# Patient Record
Sex: FEMALE | Race: WHITE | NOT HISPANIC OR LATINO
[De-identification: names, ages, dates, MRNs, and addresses within clinical notes are randomized per-mention and may not be internally consistent; named-entity substitution may affect disease eponyms.]

---

## 2017-02-16 ENCOUNTER — APPOINTMENT (OUTPATIENT)
Dept: FAMILY MEDICINE | Facility: CLINIC | Age: 62
End: 2017-02-16

## 2017-02-16 ENCOUNTER — NON-APPOINTMENT (OUTPATIENT)
Age: 62
End: 2017-02-16

## 2017-02-16 VITALS
WEIGHT: 228.38 LBS | SYSTOLIC BLOOD PRESSURE: 124 MMHG | DIASTOLIC BLOOD PRESSURE: 84 MMHG | BODY MASS INDEX: 39.47 KG/M2 | HEIGHT: 63.75 IN

## 2017-02-21 LAB
HBA1C MFR BLD HPLC: 6.4 %
T4 FREE SERPL-MCNC: 1.3 NG/DL
TSH SERPL-ACNC: 7.25 UIU/ML

## 2017-03-16 ENCOUNTER — APPOINTMENT (OUTPATIENT)
Dept: FAMILY MEDICINE | Facility: CLINIC | Age: 62
End: 2017-03-16

## 2017-08-15 ENCOUNTER — APPOINTMENT (OUTPATIENT)
Dept: FAMILY MEDICINE | Facility: CLINIC | Age: 62
End: 2017-08-15
Payer: MEDICARE

## 2017-08-15 ENCOUNTER — LABORATORY RESULT (OUTPATIENT)
Age: 62
End: 2017-08-15

## 2017-08-15 VITALS
BODY MASS INDEX: 39.75 KG/M2 | RESPIRATION RATE: 16 BRPM | SYSTOLIC BLOOD PRESSURE: 132 MMHG | DIASTOLIC BLOOD PRESSURE: 80 MMHG | OXYGEN SATURATION: 97 % | WEIGHT: 230 LBS | HEART RATE: 68 BPM | HEIGHT: 63.75 IN

## 2017-08-15 DIAGNOSIS — M62.830 MUSCLE SPASM OF BACK: ICD-10-CM

## 2017-08-15 DIAGNOSIS — M62.838 OTHER MUSCLE SPASM: ICD-10-CM

## 2017-08-15 PROCEDURE — 36415 COLL VENOUS BLD VENIPUNCTURE: CPT

## 2017-08-15 PROCEDURE — 99214 OFFICE O/P EST MOD 30 MIN: CPT | Mod: 25

## 2017-08-15 RX ORDER — AMOXICILLIN 500 MG/1
500 CAPSULE ORAL
Qty: 21 | Refills: 0 | Status: COMPLETED | COMMUNITY
Start: 2017-02-20

## 2017-08-15 RX ORDER — IBUPROFEN 800 MG/1
800 TABLET, FILM COATED ORAL
Qty: 21 | Refills: 0 | Status: COMPLETED | COMMUNITY
Start: 2017-02-20

## 2017-08-18 LAB
ALBUMIN SERPL ELPH-MCNC: 4 G/DL
ALP BLD-CCNC: 100 U/L
ALT SERPL-CCNC: 57 U/L
ANION GAP SERPL CALC-SCNC: 15 MMOL/L
APTT BLD: 30.4 SEC
AST SERPL-CCNC: 46 U/L
BASOPHILS # BLD AUTO: 0.11 K/UL
BASOPHILS NFR BLD AUTO: 1.8 %
BILIRUB SERPL-MCNC: 0.4 MG/DL
BUN SERPL-MCNC: 16 MG/DL
CALCIUM SERPL-MCNC: 8.9 MG/DL
CHLORIDE SERPL-SCNC: 102 MMOL/L
CO2 SERPL-SCNC: 26 MMOL/L
CREAT SERPL-MCNC: 0.58 MG/DL
EOSINOPHIL # BLD AUTO: 0.11 K/UL
EOSINOPHIL NFR BLD AUTO: 1.8 %
GLUCOSE SERPL-MCNC: 201 MG/DL
HCT VFR BLD CALC: 37.6 %
HGB BLD-MCNC: 11.6 G/DL
INR PPP: 0.86 RATIO
LYMPHOCYTES # BLD AUTO: 1.35 K/UL
LYMPHOCYTES NFR BLD AUTO: 21.4 %
MAN DIFF?: NORMAL
MCHC RBC-ENTMCNC: 29.4 PG
MCHC RBC-ENTMCNC: 30.9 GM/DL
MCV RBC AUTO: 95.4 FL
MONOCYTES # BLD AUTO: 0.62 K/UL
MONOCYTES NFR BLD AUTO: 9.8 %
NEUTROPHILS # BLD AUTO: 3.84 K/UL
NEUTROPHILS NFR BLD AUTO: 60.7 %
PLATELET # BLD AUTO: 185 K/UL
POTASSIUM SERPL-SCNC: 4.3 MMOL/L
PROT SERPL-MCNC: 7.3 G/DL
PT BLD: 9.7 SEC
RBC # BLD: 3.94 M/UL
RBC # FLD: 13.4 %
SODIUM SERPL-SCNC: 143 MMOL/L
T4 FREE SERPL-MCNC: 1.1 NG/DL
TSH SERPL-ACNC: 9.03 UIU/ML
WBC # FLD AUTO: 6.32 K/UL

## 2017-09-15 LAB
ALBUMIN SERPL ELPH-MCNC: 4 G/DL
ALP BLD-CCNC: 97 U/L
ALT SERPL-CCNC: 61 U/L
AST SERPL-CCNC: 42 U/L
BILIRUB DIRECT SERPL-MCNC: 0.1 MG/DL
BILIRUB INDIRECT SERPL-MCNC: 0.2 MG/DL
BILIRUB SERPL-MCNC: 0.3 MG/DL
PROT SERPL-MCNC: 7.3 G/DL

## 2017-09-19 ENCOUNTER — APPOINTMENT (OUTPATIENT)
Dept: FAMILY MEDICINE | Facility: CLINIC | Age: 62
End: 2017-09-19
Payer: MEDICARE

## 2017-09-19 ENCOUNTER — MED ADMIN CHARGE (OUTPATIENT)
Age: 62
End: 2017-09-19

## 2017-09-19 PROCEDURE — G0008: CPT

## 2017-09-19 PROCEDURE — 90688 IIV4 VACCINE SPLT 0.5 ML IM: CPT

## 2017-12-08 ENCOUNTER — APPOINTMENT (OUTPATIENT)
Dept: FAMILY MEDICINE | Facility: CLINIC | Age: 62
End: 2017-12-08
Payer: MEDICARE

## 2017-12-08 VITALS
OXYGEN SATURATION: 97 % | TEMPERATURE: 98.5 F | HEIGHT: 63.75 IN | WEIGHT: 225 LBS | DIASTOLIC BLOOD PRESSURE: 82 MMHG | HEART RATE: 82 BPM | SYSTOLIC BLOOD PRESSURE: 126 MMHG | BODY MASS INDEX: 38.89 KG/M2

## 2017-12-08 DIAGNOSIS — M72.2 PLANTAR FASCIAL FIBROMATOSIS: ICD-10-CM

## 2017-12-08 DIAGNOSIS — E55.9 VITAMIN D DEFICIENCY, UNSPECIFIED: ICD-10-CM

## 2017-12-08 LAB
25(OH)D3 SERPL-MCNC: 16.1 NG/ML
ALBUMIN SERPL ELPH-MCNC: 3.9 G/DL
ALP BLD-CCNC: 101 U/L
ALT SERPL-CCNC: 80 U/L
ANION GAP SERPL CALC-SCNC: 10 MMOL/L
AST SERPL-CCNC: 64 U/L
BILIRUB SERPL-MCNC: 0.4 MG/DL
BUN SERPL-MCNC: 19 MG/DL
CALCIUM SERPL-MCNC: 9 MG/DL
CHLORIDE SERPL-SCNC: 102 MMOL/L
CHOLEST SERPL-MCNC: 180 MG/DL
CHOLEST/HDLC SERPL: 3.6 RATIO
CO2 SERPL-SCNC: 28 MMOL/L
CREAT SERPL-MCNC: 0.63 MG/DL
GLUCOSE SERPL-MCNC: 241 MG/DL
HBA1C MFR BLD HPLC: 7.8 %
HDLC SERPL-MCNC: 50 MG/DL
LDLC SERPL CALC-MCNC: 107 MG/DL
POTASSIUM SERPL-SCNC: 4.4 MMOL/L
PROT SERPL-MCNC: 6.4 G/DL
SODIUM SERPL-SCNC: 140 MMOL/L
T4 FREE SERPL-MCNC: 1.7 NG/DL
TRIGL SERPL-MCNC: 113 MG/DL
TSH SERPL-ACNC: 0.83 UIU/ML

## 2017-12-08 PROCEDURE — 99214 OFFICE O/P EST MOD 30 MIN: CPT

## 2018-01-12 ENCOUNTER — APPOINTMENT (OUTPATIENT)
Dept: FAMILY MEDICINE | Facility: CLINIC | Age: 63
End: 2018-01-12
Payer: MEDICARE

## 2018-01-12 VITALS
OXYGEN SATURATION: 98 % | TEMPERATURE: 98.2 F | WEIGHT: 224 LBS | BODY MASS INDEX: 38.71 KG/M2 | SYSTOLIC BLOOD PRESSURE: 140 MMHG | HEIGHT: 63.75 IN | HEART RATE: 81 BPM | DIASTOLIC BLOOD PRESSURE: 80 MMHG

## 2018-01-12 PROCEDURE — 99214 OFFICE O/P EST MOD 30 MIN: CPT

## 2018-01-12 RX ORDER — OXYCODONE 5 MG/1
5 TABLET ORAL
Qty: 60 | Refills: 0 | Status: COMPLETED | COMMUNITY
Start: 2017-07-19

## 2018-02-12 ENCOUNTER — TRANSCRIPTION ENCOUNTER (OUTPATIENT)
Age: 63
End: 2018-02-12

## 2018-02-13 ENCOUNTER — APPOINTMENT (OUTPATIENT)
Dept: FAMILY MEDICINE | Facility: CLINIC | Age: 63
End: 2018-02-13

## 2018-02-21 ENCOUNTER — RX RENEWAL (OUTPATIENT)
Age: 63
End: 2018-02-21

## 2018-02-21 DIAGNOSIS — N20.0 CALCULUS OF KIDNEY: ICD-10-CM

## 2018-02-26 ENCOUNTER — APPOINTMENT (OUTPATIENT)
Dept: FAMILY MEDICINE | Facility: CLINIC | Age: 63
End: 2018-02-26
Payer: MEDICARE

## 2018-02-26 ENCOUNTER — NON-APPOINTMENT (OUTPATIENT)
Age: 63
End: 2018-02-26

## 2018-02-26 VITALS
WEIGHT: 215 LBS | BODY MASS INDEX: 36.7 KG/M2 | SYSTOLIC BLOOD PRESSURE: 140 MMHG | DIASTOLIC BLOOD PRESSURE: 80 MMHG | TEMPERATURE: 97.6 F | HEIGHT: 64 IN

## 2018-02-26 PROCEDURE — 99214 OFFICE O/P EST MOD 30 MIN: CPT

## 2018-03-01 ENCOUNTER — APPOINTMENT (OUTPATIENT)
Dept: CARDIOLOGY | Facility: CLINIC | Age: 63
End: 2018-03-01
Payer: MEDICARE

## 2018-03-01 VITALS
OXYGEN SATURATION: 98 % | DIASTOLIC BLOOD PRESSURE: 83 MMHG | SYSTOLIC BLOOD PRESSURE: 124 MMHG | HEIGHT: 64 IN | WEIGHT: 215 LBS | HEART RATE: 71 BPM | BODY MASS INDEX: 36.7 KG/M2

## 2018-03-01 DIAGNOSIS — I65.29 OCCLUSION AND STENOSIS OF UNSPECIFIED CAROTID ARTERY: ICD-10-CM

## 2018-03-01 DIAGNOSIS — R01.1 CARDIAC MURMUR, UNSPECIFIED: ICD-10-CM

## 2018-03-01 PROCEDURE — 99203 OFFICE O/P NEW LOW 30 MIN: CPT

## 2018-03-05 ENCOUNTER — APPOINTMENT (OUTPATIENT)
Dept: ULTRASOUND IMAGING | Facility: CLINIC | Age: 63
End: 2018-03-05
Payer: MEDICARE

## 2018-03-05 ENCOUNTER — OUTPATIENT (OUTPATIENT)
Dept: OUTPATIENT SERVICES | Facility: HOSPITAL | Age: 63
LOS: 1 days | End: 2018-03-05
Payer: MEDICARE

## 2018-03-05 ENCOUNTER — RX RENEWAL (OUTPATIENT)
Age: 63
End: 2018-03-05

## 2018-03-05 ENCOUNTER — APPOINTMENT (OUTPATIENT)
Dept: CARDIOLOGY | Facility: CLINIC | Age: 63
End: 2018-03-05

## 2018-03-05 ENCOUNTER — APPOINTMENT (OUTPATIENT)
Dept: UROLOGY | Facility: CLINIC | Age: 63
End: 2018-03-05
Payer: MEDICARE

## 2018-03-05 ENCOUNTER — APPOINTMENT (OUTPATIENT)
Dept: RADIOLOGY | Facility: CLINIC | Age: 63
End: 2018-03-05
Payer: MEDICARE

## 2018-03-05 VITALS
WEIGHT: 215 LBS | DIASTOLIC BLOOD PRESSURE: 70 MMHG | SYSTOLIC BLOOD PRESSURE: 103 MMHG | HEIGHT: 64 IN | BODY MASS INDEX: 36.7 KG/M2 | HEART RATE: 81 BPM

## 2018-03-05 DIAGNOSIS — Z41.1 ENCOUNTER FOR COSMETIC SURGERY: Chronic | ICD-10-CM

## 2018-03-05 DIAGNOSIS — N13.30 UNSPECIFIED HYDRONEPHROSIS: ICD-10-CM

## 2018-03-05 DIAGNOSIS — K46.9 UNSPECIFIED ABDOMINAL HERNIA WITHOUT OBSTRUCTION OR GANGRENE: Chronic | ICD-10-CM

## 2018-03-05 DIAGNOSIS — M75.120 COMPLETE ROTATOR CUFF TEAR OR RUPTURE OF UNSPECIFIED SHOULDER, NOT SPECIFIED AS TRAUMATIC: Chronic | ICD-10-CM

## 2018-03-05 DIAGNOSIS — Z98.84 BARIATRIC SURGERY STATUS: Chronic | ICD-10-CM

## 2018-03-05 DIAGNOSIS — Z00.8 ENCOUNTER FOR OTHER GENERAL EXAMINATION: ICD-10-CM

## 2018-03-05 DIAGNOSIS — Z98.89 OTHER SPECIFIED POSTPROCEDURAL STATES: Chronic | ICD-10-CM

## 2018-03-05 DIAGNOSIS — Z96.653 PRESENCE OF ARTIFICIAL KNEE JOINT, BILATERAL: Chronic | ICD-10-CM

## 2018-03-05 DIAGNOSIS — N20.1 CALCULUS OF URETER: ICD-10-CM

## 2018-03-05 DIAGNOSIS — L02.92 FURUNCLE, UNSPECIFIED: Chronic | ICD-10-CM

## 2018-03-05 DIAGNOSIS — R10.9 UNSPECIFIED ABDOMINAL PAIN: ICD-10-CM

## 2018-03-05 LAB
BILIRUB UR QL STRIP: NORMAL
CLARITY UR: CLEAR
COLLECTION METHOD: NORMAL
GLUCOSE UR-MCNC: 100
HCG UR QL: 1 EU/DL
HGB UR QL STRIP.AUTO: NORMAL
KETONES UR-MCNC: NORMAL
LEUKOCYTE ESTERASE UR QL STRIP: NORMAL
NITRITE UR QL STRIP: NORMAL
PH UR STRIP: 7
PROT UR STRIP-MCNC: NORMAL
SP GR UR STRIP: 1.02

## 2018-03-05 PROCEDURE — 74018 RADEX ABDOMEN 1 VIEW: CPT | Mod: 26

## 2018-03-05 PROCEDURE — 99204 OFFICE O/P NEW MOD 45 MIN: CPT

## 2018-03-05 PROCEDURE — 76770 US EXAM ABDO BACK WALL COMP: CPT | Mod: 26

## 2018-03-05 PROCEDURE — 76770 US EXAM ABDO BACK WALL COMP: CPT

## 2018-03-05 PROCEDURE — 81003 URINALYSIS AUTO W/O SCOPE: CPT | Mod: QW

## 2018-03-05 PROCEDURE — 74018 RADEX ABDOMEN 1 VIEW: CPT

## 2018-03-06 ENCOUNTER — APPOINTMENT (OUTPATIENT)
Dept: FAMILY MEDICINE | Facility: CLINIC | Age: 63
End: 2018-03-06

## 2018-04-09 ENCOUNTER — APPOINTMENT (OUTPATIENT)
Dept: CARDIOLOGY | Facility: CLINIC | Age: 63
End: 2018-04-09
Payer: MEDICARE

## 2018-04-09 ENCOUNTER — APPOINTMENT (OUTPATIENT)
Dept: FAMILY MEDICINE | Facility: CLINIC | Age: 63
End: 2018-04-09
Payer: MEDICARE

## 2018-04-09 VITALS
SYSTOLIC BLOOD PRESSURE: 118 MMHG | HEIGHT: 64 IN | HEART RATE: 86 BPM | DIASTOLIC BLOOD PRESSURE: 80 MMHG | TEMPERATURE: 97.9 F | WEIGHT: 215 LBS | OXYGEN SATURATION: 99 % | BODY MASS INDEX: 36.7 KG/M2

## 2018-04-09 PROCEDURE — 99214 OFFICE O/P EST MOD 30 MIN: CPT

## 2018-04-09 PROCEDURE — 93306 TTE W/DOPPLER COMPLETE: CPT

## 2018-04-10 ENCOUNTER — RX RENEWAL (OUTPATIENT)
Age: 63
End: 2018-04-10

## 2018-04-15 ENCOUNTER — RX RENEWAL (OUTPATIENT)
Age: 63
End: 2018-04-15

## 2018-04-27 ENCOUNTER — APPOINTMENT (OUTPATIENT)
Dept: CARDIOLOGY | Facility: CLINIC | Age: 63
End: 2018-04-27

## 2018-06-05 ENCOUNTER — RX RENEWAL (OUTPATIENT)
Age: 63
End: 2018-06-05

## 2018-06-06 ENCOUNTER — LABORATORY RESULT (OUTPATIENT)
Age: 63
End: 2018-06-06

## 2018-06-07 ENCOUNTER — APPOINTMENT (OUTPATIENT)
Dept: FAMILY MEDICINE | Facility: CLINIC | Age: 63
End: 2018-06-07
Payer: MEDICARE

## 2018-06-07 VITALS
HEIGHT: 64 IN | WEIGHT: 225 LBS | DIASTOLIC BLOOD PRESSURE: 80 MMHG | SYSTOLIC BLOOD PRESSURE: 118 MMHG | BODY MASS INDEX: 38.41 KG/M2

## 2018-06-07 DIAGNOSIS — R19.8 OTHER SPECIFIED SYMPTOMS AND SIGNS INVOLVING THE DIGESTIVE SYSTEM AND ABDOMEN: ICD-10-CM

## 2018-06-07 PROCEDURE — 99214 OFFICE O/P EST MOD 30 MIN: CPT

## 2018-06-07 RX ORDER — AMOXICILLIN AND CLAVULANATE POTASSIUM 875; 125 MG/1; MG/1
875-125 TABLET, COATED ORAL
Qty: 20 | Refills: 0 | Status: COMPLETED | COMMUNITY
Start: 2018-01-12 | End: 2018-06-07

## 2018-06-07 RX ORDER — LEVOTHYROXINE SODIUM 0.17 MG/1
175 TABLET ORAL
Qty: 45 | Refills: 0 | Status: COMPLETED | COMMUNITY
Start: 2017-02-21 | End: 2018-06-07

## 2018-06-07 RX ORDER — AZITHROMYCIN 250 MG/1
250 TABLET, FILM COATED ORAL
Qty: 1 | Refills: 0 | Status: COMPLETED | COMMUNITY
Start: 2018-04-09 | End: 2018-06-07

## 2018-06-07 NOTE — HISTORY OF PRESENT ILLNESS
[de-identified] : Pt c/o umbilical discharge w/ pain intermittently over past 2 years. Pt saw surgeon Dr. Ovalle, who ordered CT abd/pelvis. Pt had purulent discharge several months ago, now discharge is clear. Umbilical area is tender for past 3 weeks.\par Pt also f/u of DM, hypothyroidism. Pt in office for review of recent labs. Pt also needs refill of singulair for asthma.

## 2018-06-07 NOTE — ASSESSMENT
[FreeTextEntry1] : umbilical discharge - pt to obtain CT abd/pelvis ordered by gen surg. check culture of discharge\par DM- Hba1c 8.7 on 6/6 labs, uncontrolled. inc metformin to 1000mg q day. Advised lifestyle modifications for wt loss\par Hypothyroidism - TFTs ok, refill levothyroxine

## 2018-06-12 LAB — BACTERIA WND CULT: ABNORMAL

## 2018-06-22 ENCOUNTER — APPOINTMENT (OUTPATIENT)
Dept: CARDIOLOGY | Facility: CLINIC | Age: 63
End: 2018-06-22
Payer: MEDICARE

## 2018-06-22 PROCEDURE — A9500: CPT

## 2018-06-22 PROCEDURE — 78452 HT MUSCLE IMAGE SPECT MULT: CPT

## 2018-06-22 PROCEDURE — 93018 CV STRESS TEST I&R ONLY: CPT

## 2018-06-22 PROCEDURE — 93017 CV STRESS TEST TRACING ONLY: CPT

## 2018-07-26 ENCOUNTER — APPOINTMENT (OUTPATIENT)
Dept: FAMILY MEDICINE | Facility: CLINIC | Age: 63
End: 2018-07-26
Payer: MEDICARE

## 2018-07-26 ENCOUNTER — RESULT CHARGE (OUTPATIENT)
Age: 63
End: 2018-07-26

## 2018-07-26 VITALS
WEIGHT: 224.13 LBS | SYSTOLIC BLOOD PRESSURE: 124 MMHG | HEART RATE: 88 BPM | OXYGEN SATURATION: 98 % | TEMPERATURE: 98.7 F | DIASTOLIC BLOOD PRESSURE: 80 MMHG | BODY MASS INDEX: 38.26 KG/M2 | HEIGHT: 64 IN

## 2018-07-26 LAB — S PYO AG SPEC QL IA: NEGATIVE

## 2018-07-26 PROCEDURE — 87880 STREP A ASSAY W/OPTIC: CPT | Mod: QW

## 2018-07-26 PROCEDURE — 99214 OFFICE O/P EST MOD 30 MIN: CPT | Mod: 25

## 2018-07-26 NOTE — HISTORY OF PRESENT ILLNESS
[FreeTextEntry8] : recent onset of sore throat also bilateral lower leg swelling has been on a diuretic in the past

## 2018-07-26 NOTE — PHYSICAL EXAM
[No Acute Distress] : no acute distress [Well Nourished] : well nourished [Well Developed] : well developed [Well-Appearing] : well-appearing [PERRL] : pupils equal round and reactive to light [Normal Oropharynx] : the oropharynx was normal [Supple] : supple [No Lymphadenopathy] : no lymphadenopathy [No Respiratory Distress] : no respiratory distress  [Clear to Auscultation] : lungs were clear to auscultation bilaterally [No Accessory Muscle Use] : no accessory muscle use [Normal Rate] : normal rate  [Regular Rhythm] : with a regular rhythm [Normal S1, S2] : normal S1 and S2 [No Murmur] : no murmur heard [de-identified] : 2+bilateral edema lower legs pulses good

## 2018-07-26 NOTE — REVIEW OF SYSTEMS
[Sore Throat] : sore throat [Negative] : Respiratory [FreeTextEntry5] : bilateral lower leg swelling

## 2018-08-16 ENCOUNTER — RX RENEWAL (OUTPATIENT)
Age: 63
End: 2018-08-16

## 2018-09-15 ENCOUNTER — RX RENEWAL (OUTPATIENT)
Age: 63
End: 2018-09-15

## 2018-09-28 ENCOUNTER — APPOINTMENT (OUTPATIENT)
Dept: FAMILY MEDICINE | Facility: CLINIC | Age: 63
End: 2018-09-28
Payer: MEDICARE

## 2018-09-28 VITALS
RESPIRATION RATE: 16 BRPM | HEART RATE: 80 BPM | SYSTOLIC BLOOD PRESSURE: 114 MMHG | OXYGEN SATURATION: 97 % | WEIGHT: 226 LBS | HEIGHT: 64 IN | DIASTOLIC BLOOD PRESSURE: 68 MMHG | BODY MASS INDEX: 38.58 KG/M2

## 2018-09-28 DIAGNOSIS — L03.115 CELLULITIS OF RIGHT LOWER LIMB: ICD-10-CM

## 2018-09-28 DIAGNOSIS — J30.9 ALLERGIC RHINITIS, UNSPECIFIED: ICD-10-CM

## 2018-09-28 PROCEDURE — 99214 OFFICE O/P EST MOD 30 MIN: CPT | Mod: 25

## 2018-09-28 PROCEDURE — 36415 COLL VENOUS BLD VENIPUNCTURE: CPT

## 2018-09-28 PROCEDURE — 90686 IIV4 VACC NO PRSV 0.5 ML IM: CPT

## 2018-09-28 PROCEDURE — G0008: CPT

## 2018-09-28 RX ORDER — CYCLOBENZAPRINE HYDROCHLORIDE 10 MG/1
10 TABLET, FILM COATED ORAL 3 TIMES DAILY
Qty: 30 | Refills: 1 | Status: COMPLETED | COMMUNITY
Start: 2018-07-26 | End: 2018-09-28

## 2018-09-28 RX ORDER — TIZANIDINE 4 MG/1
4 TABLET ORAL
Qty: 1 | Refills: 2 | Status: COMPLETED | COMMUNITY
Start: 2017-08-15 | End: 2018-09-28

## 2018-09-28 RX ORDER — PHENTERMINE HYDROCHLORIDE 15 MG/1
15 CAPSULE ORAL
Qty: 30 | Refills: 0 | Status: COMPLETED | COMMUNITY
Start: 2017-02-16 | End: 2018-09-28

## 2018-09-28 RX ORDER — TAMSULOSIN HYDROCHLORIDE 0.4 MG/1
0.4 CAPSULE ORAL
Qty: 60 | Refills: 0 | Status: COMPLETED | COMMUNITY
Start: 2018-02-21 | End: 2018-09-28

## 2018-09-28 RX ORDER — AZITHROMYCIN 250 MG/1
250 TABLET, FILM COATED ORAL
Qty: 1 | Refills: 0 | Status: DISCONTINUED | COMMUNITY
Start: 2018-07-10 | End: 2018-09-28

## 2018-09-29 NOTE — ASSESSMENT
[FreeTextEntry1] : RLE cellulitlis - start course of keflex TID. monitor, if pain or erythema worsens call office will need to switch antibx. f/u in 7-14 days otherwise\par DM - cont metformin, stressed carb controlled diet and weight loss w/ pt. check hba1c\par hypothyroidism - refill levothyroxine, check TFTs\par allergci rhinitis, asthma - refill azelastine prn. singulair\par GERD - protonix prn. Possible adverse effects discussed with pt\par Risks and benefits of flu vaccine discussed w/ pt. Consent given by pt, flu vaccine administered. Pt tolerated well

## 2018-09-29 NOTE — HISTORY OF PRESENT ILLNESS
[FreeTextEntry8] : Pt c/o inc b/l LE swelling and inc redness in past 2 weeks. R>L. Pt also c/o pain in RLE overlying area of redness. Denies fever, chills.\par Pt for f/u DM, GERD, hypothyroidism, asthma/allergic rhinitis needs refills of meds. Pt has not been following a healthy carb controlled diet. She does not check her glucose at this time. Denies CP, palpitations, dyspnea, n/v

## 2018-10-02 LAB
ALBUMIN SERPL ELPH-MCNC: 4.2 G/DL
ALP BLD-CCNC: 89 U/L
ALT SERPL-CCNC: 89 U/L
ANION GAP SERPL CALC-SCNC: 15 MMOL/L
AST SERPL-CCNC: 84 U/L
BILIRUB SERPL-MCNC: 0.5 MG/DL
BUN SERPL-MCNC: 12 MG/DL
CALCIUM SERPL-MCNC: 9.6 MG/DL
CHLORIDE SERPL-SCNC: 97 MMOL/L
CHOLEST SERPL-MCNC: 162 MG/DL
CHOLEST/HDLC SERPL: 3.2 RATIO
CO2 SERPL-SCNC: 27 MMOL/L
CREAT SERPL-MCNC: 0.69 MG/DL
GLUCOSE SERPL-MCNC: 187 MG/DL
HBA1C MFR BLD HPLC: 8.7 %
HDLC SERPL-MCNC: 51 MG/DL
LDLC SERPL CALC-MCNC: 82 MG/DL
POTASSIUM SERPL-SCNC: 3.8 MMOL/L
PROT SERPL-MCNC: 6.9 G/DL
SODIUM SERPL-SCNC: 139 MMOL/L
T4 FREE SERPL-MCNC: 1.6 NG/DL
TRIGL SERPL-MCNC: 143 MG/DL
TSH SERPL-ACNC: 2.73 UIU/ML

## 2018-10-12 ENCOUNTER — APPOINTMENT (OUTPATIENT)
Dept: FAMILY MEDICINE | Facility: CLINIC | Age: 63
End: 2018-10-12
Payer: MEDICARE

## 2018-10-12 VITALS
BODY MASS INDEX: 38.58 KG/M2 | WEIGHT: 226 LBS | HEIGHT: 64 IN | SYSTOLIC BLOOD PRESSURE: 118 MMHG | RESPIRATION RATE: 16 BRPM | HEART RATE: 106 BPM | DIASTOLIC BLOOD PRESSURE: 62 MMHG | OXYGEN SATURATION: 97 %

## 2018-10-12 PROCEDURE — 99213 OFFICE O/P EST LOW 20 MIN: CPT

## 2018-10-14 NOTE — ASSESSMENT
[FreeTextEntry1] : b/l skin changes of legs - b/l celllulitis unlikely, likely more chronic venous stasis changes from edema. Advised use of compression stockings b/l, elevation of extremities. d/c hctz, start lasix q day. f/u in 1 week for leg recheck. If erythema/pain worsens rto sooner or go to ER\par yeast infection - start diflucan

## 2018-10-14 NOTE — HISTORY OF PRESENT ILLNESS
[de-identified] : Pt in office for f/u of leg edema and redness. Redness in legs has not improved after course of keflex. Pt has mild pain in b/l legs when touching anterior tibias. Denies fever, chills, feeling ill. Pt has compression socks but has not used them as of yet. Denies CP, palpitations, dyspnea, n/v. Pt also c/o vaginal itching and discharge since being on antibx.

## 2018-10-19 ENCOUNTER — APPOINTMENT (OUTPATIENT)
Dept: FAMILY MEDICINE | Facility: CLINIC | Age: 63
End: 2018-10-19

## 2018-11-09 ENCOUNTER — RX RENEWAL (OUTPATIENT)
Age: 63
End: 2018-11-09

## 2018-12-12 ENCOUNTER — APPOINTMENT (OUTPATIENT)
Dept: CT IMAGING | Facility: CLINIC | Age: 63
End: 2018-12-12

## 2018-12-12 ENCOUNTER — OUTPATIENT (OUTPATIENT)
Dept: OUTPATIENT SERVICES | Facility: HOSPITAL | Age: 63
LOS: 1 days | End: 2018-12-12
Payer: MEDICARE

## 2018-12-12 ENCOUNTER — TRANSCRIPTION ENCOUNTER (OUTPATIENT)
Age: 63
End: 2018-12-12

## 2018-12-12 DIAGNOSIS — Z98.89 OTHER SPECIFIED POSTPROCEDURAL STATES: Chronic | ICD-10-CM

## 2018-12-12 DIAGNOSIS — Z98.84 BARIATRIC SURGERY STATUS: Chronic | ICD-10-CM

## 2018-12-12 DIAGNOSIS — Z41.1 ENCOUNTER FOR COSMETIC SURGERY: Chronic | ICD-10-CM

## 2018-12-12 DIAGNOSIS — Z00.8 ENCOUNTER FOR OTHER GENERAL EXAMINATION: ICD-10-CM

## 2018-12-12 DIAGNOSIS — L02.92 FURUNCLE, UNSPECIFIED: Chronic | ICD-10-CM

## 2018-12-12 DIAGNOSIS — Z96.653 PRESENCE OF ARTIFICIAL KNEE JOINT, BILATERAL: Chronic | ICD-10-CM

## 2018-12-12 DIAGNOSIS — K46.9 UNSPECIFIED ABDOMINAL HERNIA WITHOUT OBSTRUCTION OR GANGRENE: Chronic | ICD-10-CM

## 2018-12-12 DIAGNOSIS — S09.90XA UNSPECIFIED INJURY OF HEAD, INITIAL ENCOUNTER: ICD-10-CM

## 2018-12-12 DIAGNOSIS — M75.120 COMPLETE ROTATOR CUFF TEAR OR RUPTURE OF UNSPECIFIED SHOULDER, NOT SPECIFIED AS TRAUMATIC: Chronic | ICD-10-CM

## 2018-12-12 PROCEDURE — 70450 CT HEAD/BRAIN W/O DYE: CPT | Mod: 26

## 2018-12-12 PROCEDURE — 70450 CT HEAD/BRAIN W/O DYE: CPT

## 2019-01-03 ENCOUNTER — RX RENEWAL (OUTPATIENT)
Age: 64
End: 2019-01-03

## 2019-01-03 ENCOUNTER — APPOINTMENT (OUTPATIENT)
Dept: FAMILY MEDICINE | Facility: CLINIC | Age: 64
End: 2019-01-03
Payer: MEDICARE

## 2019-01-03 VITALS
DIASTOLIC BLOOD PRESSURE: 80 MMHG | HEART RATE: 78 BPM | HEIGHT: 64 IN | OXYGEN SATURATION: 98 % | BODY MASS INDEX: 35.85 KG/M2 | SYSTOLIC BLOOD PRESSURE: 106 MMHG | WEIGHT: 210 LBS

## 2019-01-03 PROCEDURE — 99214 OFFICE O/P EST MOD 30 MIN: CPT

## 2019-01-06 ENCOUNTER — RX RENEWAL (OUTPATIENT)
Age: 64
End: 2019-01-06

## 2019-01-06 NOTE — HISTORY OF PRESENT ILLNESS
[FreeTextEntry8] : Pt c/o sinus pressure pain, for approx 7 days. Symptoms have been worsening in past 1-2 days.\par Pt c/o repeated falls in past year. Pt has chronic knee pain and feels locking in knees. She is s/p knee replacement surgery but still feels chronic knee pain. She has not recently f/u with orthopedist or had Xrays of knees.\par Pt needs refill of metformin for her DM and singulair for asthma.\par

## 2019-01-06 NOTE — ASSESSMENT
[FreeTextEntry1] : b/l knee pain - check XRays b/l knees, referral to ortho\par hypothyroidism - cont levothyroxine, check TFTs\par DM- check hba1c, refill metformin\par sinusitis - start fluticasone bid. Increase po fluid intake to maintain adequate hydration. Rest. Start course of antibx if no improvement in symptoms

## 2019-01-07 LAB
25(OH)D3 SERPL-MCNC: 34.9 NG/ML
ALBUMIN SERPL ELPH-MCNC: 4.2 G/DL
ALP BLD-CCNC: 87 U/L
ALT SERPL-CCNC: 60 U/L
ANION GAP SERPL CALC-SCNC: 15 MMOL/L
AST SERPL-CCNC: 54 U/L
BILIRUB SERPL-MCNC: 0.3 MG/DL
BUN SERPL-MCNC: 15 MG/DL
CALCIUM SERPL-MCNC: 9.8 MG/DL
CHLORIDE SERPL-SCNC: 100 MMOL/L
CO2 SERPL-SCNC: 25 MMOL/L
CREAT SERPL-MCNC: 0.7 MG/DL
GLUCOSE SERPL-MCNC: 354 MG/DL
HBA1C MFR BLD HPLC: 7.8 %
POTASSIUM SERPL-SCNC: 4.2 MMOL/L
PROT SERPL-MCNC: 7 G/DL
SODIUM SERPL-SCNC: 140 MMOL/L
T4 FREE SERPL-MCNC: 1.5 NG/DL
TSH SERPL-ACNC: 1.45 UIU/ML

## 2019-01-13 ENCOUNTER — FORM ENCOUNTER (OUTPATIENT)
Age: 64
End: 2019-01-13

## 2019-01-14 ENCOUNTER — OUTPATIENT (OUTPATIENT)
Dept: OUTPATIENT SERVICES | Facility: HOSPITAL | Age: 64
LOS: 1 days | End: 2019-01-14
Payer: MEDICARE

## 2019-01-14 ENCOUNTER — APPOINTMENT (OUTPATIENT)
Dept: RADIOLOGY | Facility: CLINIC | Age: 64
End: 2019-01-14
Payer: MEDICARE

## 2019-01-14 DIAGNOSIS — Z96.653 PRESENCE OF ARTIFICIAL KNEE JOINT, BILATERAL: Chronic | ICD-10-CM

## 2019-01-14 DIAGNOSIS — K46.9 UNSPECIFIED ABDOMINAL HERNIA WITHOUT OBSTRUCTION OR GANGRENE: Chronic | ICD-10-CM

## 2019-01-14 DIAGNOSIS — Z00.8 ENCOUNTER FOR OTHER GENERAL EXAMINATION: ICD-10-CM

## 2019-01-14 DIAGNOSIS — Z98.89 OTHER SPECIFIED POSTPROCEDURAL STATES: Chronic | ICD-10-CM

## 2019-01-14 DIAGNOSIS — Z98.84 BARIATRIC SURGERY STATUS: Chronic | ICD-10-CM

## 2019-01-14 DIAGNOSIS — L02.92 FURUNCLE, UNSPECIFIED: Chronic | ICD-10-CM

## 2019-01-14 DIAGNOSIS — Z41.1 ENCOUNTER FOR COSMETIC SURGERY: Chronic | ICD-10-CM

## 2019-01-14 DIAGNOSIS — M75.120 COMPLETE ROTATOR CUFF TEAR OR RUPTURE OF UNSPECIFIED SHOULDER, NOT SPECIFIED AS TRAUMATIC: Chronic | ICD-10-CM

## 2019-01-14 PROCEDURE — 73562 X-RAY EXAM OF KNEE 3: CPT | Mod: 26,RT

## 2019-01-14 PROCEDURE — 73562 X-RAY EXAM OF KNEE 3: CPT

## 2019-01-22 ENCOUNTER — APPOINTMENT (OUTPATIENT)
Dept: FAMILY MEDICINE | Facility: CLINIC | Age: 64
End: 2019-01-22
Payer: MEDICARE

## 2019-01-22 VITALS
WEIGHT: 216.25 LBS | HEART RATE: 74 BPM | HEIGHT: 64 IN | RESPIRATION RATE: 16 BRPM | BODY MASS INDEX: 36.92 KG/M2 | DIASTOLIC BLOOD PRESSURE: 70 MMHG | OXYGEN SATURATION: 98 % | SYSTOLIC BLOOD PRESSURE: 120 MMHG

## 2019-01-22 DIAGNOSIS — R73.03 PREDIABETES.: ICD-10-CM

## 2019-01-22 DIAGNOSIS — F43.21 ADJUSTMENT DISORDER WITH DEPRESSED MOOD: ICD-10-CM

## 2019-01-22 DIAGNOSIS — Z47.1 AFTERCARE FOLLOWING JOINT REPLACEMENT SURGERY: ICD-10-CM

## 2019-01-22 DIAGNOSIS — M25.561 PAIN IN RIGHT KNEE: ICD-10-CM

## 2019-01-22 DIAGNOSIS — B37.9 CANDIDIASIS, UNSPECIFIED: ICD-10-CM

## 2019-01-22 DIAGNOSIS — M25.562 PAIN IN RIGHT KNEE: ICD-10-CM

## 2019-01-22 DIAGNOSIS — J01.10 ACUTE FRONTAL SINUSITIS, UNSPECIFIED: ICD-10-CM

## 2019-01-22 PROCEDURE — 99213 OFFICE O/P EST LOW 20 MIN: CPT | Mod: 25

## 2019-01-22 PROCEDURE — G0438: CPT

## 2019-01-22 RX ORDER — CEPHALEXIN 500 MG/1
500 CAPSULE ORAL 3 TIMES DAILY
Qty: 30 | Refills: 0 | Status: COMPLETED | COMMUNITY
Start: 2018-09-28 | End: 2019-01-22

## 2019-01-22 RX ORDER — FLUCONAZOLE 150 MG/1
150 TABLET ORAL
Qty: 2 | Refills: 0 | Status: COMPLETED | COMMUNITY
Start: 2018-10-12 | End: 2019-01-22

## 2019-01-23 PROBLEM — R73.03 PREDIABETES: Noted: 2017-02-16

## 2019-01-23 NOTE — ASSESSMENT
[FreeTextEntry1] : yeast infetion - start diflucan\par chronic back pain, b/l knee pain - f/u with ortho. cont seeing pain mgmt\par DM- cont meds, advised carb controlled diet. recheck hba1c in 3 months\par anemia, hx elev lfts - monitor\par back spasms - refill tizanidine prn\par obesity - Advised lifestyle modifications for wt loss. Healthy diet and regular exercise regimen discussed w/ pt\par Healthy diet and regular exercise regimen discussed w/ pt.\par Screening labs ordered\par referral to GI for screening colonoscopy\par Advised routine pap smear and mammogram\par Any questions call office

## 2019-01-23 NOTE — HEALTH RISK ASSESSMENT
[Fair] : ~his/her~ current health as fair  [Very Good] : ~his/her~  mood as very good [Two or more falls in past year] : Patient reported two or more falls in the past year [1] : 2) Feeling down, depressed, or hopeless for several days (1) [Patient reported mammogram was normal] : Patient reported mammogram was normal [Patient reported PAP Smear was normal] : Patient reported PAP Smear was normal [Patient reported colonoscopy was abnormal] : Patient reported colonoscopy was abnormal [HIV test declined] : HIV test declined [Hepatitis C test declined] : Hepatitis C test declined [None] : None [With Family] : lives with family [Feels Safe at Home] : Feels safe at home [Fully functional (bathing, dressing, toileting, transferring, walking, feeding)] : Fully functional (bathing, dressing, toileting, transferring, walking, feeding) [Fully functional (using the telephone, shopping, preparing meals, housekeeping, doing laundry, using] : Fully functional and needs no help or supervision to perform IADLs (using the telephone, shopping, preparing meals, housekeeping, doing laundry, using transportation, managing medications and managing finances) [Smoke Detector] : smoke detector [Carbon Monoxide Detector] : carbon monoxide detector [Seat Belt] :  uses seat belt [Discussed at today's visit] : Advance Directives Discussed at today's visit [Aggressive treatment] : aggressive treatment [] : No [de-identified] : falling due to knee problems [[id number]] : 2 [Change in mental status noted] : No change in mental status noted [Language] : denies difficulty with language [Behavior] : denies difficulty with behavior [Learning/Retaining New Information] : denies difficulty learning/retaining new information [Handling Complex Tasks] : denies difficulty handling complex tasks [Reasoning] : denies difficulty with reasoning [Spatial Ability and Orientation] : denies difficulty with spatial ability and orientation [Reports changes in hearing] : Reports no changes in hearing [Reports changes in vision] : Reports no changes in vision [Reports changes in dental health] : Reports no changes in dental health [MammogramDate] : 01/18 [PapSmearDate] : 01/12 [ColonoscopyDate] : 01/16 [ColonoscopyComments] : benign polyps [FreeTextEntry4] : will discuss w/ son

## 2019-01-23 NOTE — HISTORY OF PRESENT ILLNESS
[de-identified] : Pt in office for CPE. Pt has hx of Crohn's last seen GI 2 yrs ago, due for colonoscopy.  Pt has chronic back and knee pain. She takes oxycontin 5mg prn and sees pain mgmt. Pt has been trying to decrease carbs in her diet as her DM has been uncontrolled recently. Pt compliant w/ meds. Denies CP, palpitations, dyspnea, n/v.\par Exsmoker, quit 20 yrs ago\par ETOH use denies\par Drug use denies\par Exercises never

## 2019-01-31 ENCOUNTER — FORM ENCOUNTER (OUTPATIENT)
Age: 64
End: 2019-01-31

## 2019-02-01 ENCOUNTER — APPOINTMENT (OUTPATIENT)
Dept: RADIOLOGY | Facility: CLINIC | Age: 64
End: 2019-02-01
Payer: MEDICARE

## 2019-02-01 ENCOUNTER — OUTPATIENT (OUTPATIENT)
Dept: OUTPATIENT SERVICES | Facility: HOSPITAL | Age: 64
LOS: 1 days | End: 2019-02-01
Payer: MEDICARE

## 2019-02-01 DIAGNOSIS — Z96.653 PRESENCE OF ARTIFICIAL KNEE JOINT, BILATERAL: Chronic | ICD-10-CM

## 2019-02-01 DIAGNOSIS — L02.92 FURUNCLE, UNSPECIFIED: Chronic | ICD-10-CM

## 2019-02-01 DIAGNOSIS — Z98.89 OTHER SPECIFIED POSTPROCEDURAL STATES: Chronic | ICD-10-CM

## 2019-02-01 DIAGNOSIS — Z41.1 ENCOUNTER FOR COSMETIC SURGERY: Chronic | ICD-10-CM

## 2019-02-01 DIAGNOSIS — Z00.00 ENCOUNTER FOR GENERAL ADULT MEDICAL EXAMINATION WITHOUT ABNORMAL FINDINGS: ICD-10-CM

## 2019-02-01 DIAGNOSIS — Z00.8 ENCOUNTER FOR OTHER GENERAL EXAMINATION: ICD-10-CM

## 2019-02-01 DIAGNOSIS — Z98.84 BARIATRIC SURGERY STATUS: Chronic | ICD-10-CM

## 2019-02-01 DIAGNOSIS — K46.9 UNSPECIFIED ABDOMINAL HERNIA WITHOUT OBSTRUCTION OR GANGRENE: Chronic | ICD-10-CM

## 2019-02-01 DIAGNOSIS — M75.120 COMPLETE ROTATOR CUFF TEAR OR RUPTURE OF UNSPECIFIED SHOULDER, NOT SPECIFIED AS TRAUMATIC: Chronic | ICD-10-CM

## 2019-02-01 PROCEDURE — 77080 DXA BONE DENSITY AXIAL: CPT | Mod: 26

## 2019-02-01 PROCEDURE — 77080 DXA BONE DENSITY AXIAL: CPT

## 2019-03-19 ENCOUNTER — APPOINTMENT (OUTPATIENT)
Dept: FAMILY MEDICINE | Facility: CLINIC | Age: 64
End: 2019-03-19
Payer: MEDICARE

## 2019-03-19 VITALS
BODY MASS INDEX: 36.88 KG/M2 | WEIGHT: 216 LBS | OXYGEN SATURATION: 96 % | SYSTOLIC BLOOD PRESSURE: 110 MMHG | DIASTOLIC BLOOD PRESSURE: 80 MMHG | HEART RATE: 89 BPM | HEIGHT: 64 IN | RESPIRATION RATE: 16 BRPM

## 2019-03-19 DIAGNOSIS — M75.102 UNSPECIFIED ROTATOR CUFF TEAR OR RUPTURE OF LEFT SHOULDER, NOT SPECIFIED AS TRAUMATIC: ICD-10-CM

## 2019-03-19 PROCEDURE — 99214 OFFICE O/P EST MOD 30 MIN: CPT

## 2019-03-19 RX ORDER — OXYCODONE AND ACETAMINOPHEN 7.5; 325 MG/1; MG/1
7.5-325 TABLET ORAL
Qty: 16 | Refills: 0 | Status: COMPLETED | COMMUNITY
Start: 2019-03-11

## 2019-03-19 RX ORDER — HYDROCHLOROTHIAZIDE 12.5 MG/1
12.5 TABLET ORAL
Qty: 90 | Refills: 0 | Status: COMPLETED | COMMUNITY
Start: 2018-07-26

## 2019-03-19 RX ORDER — CHLORHEXIDINE GLUCONATE, 0.12% ORAL RINSE 1.2 MG/ML
0.12 SOLUTION DENTAL
Qty: 473 | Refills: 0 | Status: COMPLETED | COMMUNITY
Start: 2019-03-11

## 2019-03-19 RX ORDER — IBUPROFEN 600 MG/1
600 TABLET, FILM COATED ORAL
Qty: 16 | Refills: 0 | Status: COMPLETED | COMMUNITY
Start: 2019-03-11

## 2019-03-19 RX ORDER — AMOXICILLIN AND CLAVULANATE POTASSIUM 875; 125 MG/1; MG/1
875-125 TABLET, COATED ORAL
Qty: 20 | Refills: 0 | Status: COMPLETED | COMMUNITY
Start: 2019-01-03 | End: 2019-03-19

## 2019-03-20 PROBLEM — M75.102 TEAR OF LEFT ROTATOR CUFF, UNSPECIFIED TEAR EXTENT: Status: ACTIVE | Noted: 2019-03-20

## 2019-03-26 NOTE — ASSESSMENT
[Patient Optimized for Surgery] : Patient optimized for surgery [FreeTextEntry4] : Pt is medically optimized for procedure at this time pending labs. Pt has hx of DM, suboptimally controlled. Pt aware perioperative risk of complications is increased with poorly controlled DM. Pt acknowledges this and wishes to proceed with surgery. Monitor glucose perioperatively. Pt also has hx of asthma, monitor for perioperative bronchospasm.\par \par Fax to

## 2019-03-26 NOTE — HISTORY OF PRESENT ILLNESS
[No Pertinent Cardiac History] : no history of aortic stenosis, atrial fibrillation, coronary artery disease, recent myocardial infarction, or implantable device/pacemaker [No Adverse Anesthesia Reaction] : no adverse anesthesia reaction in self or family member [Asthma] : asthma [(Patient denies any chest pain, claudication, dyspnea on exertion, orthopnea, palpitations or syncope)] : Patient denies any chest pain, claudication, dyspnea on exertion, orthopnea, palpitations or syncope [Diabetes] : diabetes [Moderate (4-6 METs)] : Moderate (4-6 METs) [Chronic Anticoagulation] : no chronic anticoagulation [Chronic Kidney Disease] : no chronic kidney disease [FreeTextEntry1] : L rotator cuff repair [FreeTextEntry2] : 3/29/19 [FreeTextEntry3] : Marley Wen [FreeTextEntry4] : Pt in office for medical clearance. Pt to go for procedure on 3/29/19, to be performed by Dr. Wen. Pt denies chest pain, palpitations, dyspnea, fever, chills, nausea, or vomiting. PSTs to be done at [facility] on 3/22/19.  [FreeTextEntry7] : 06/2018 NST normal

## 2019-03-26 NOTE — RESULTS/DATA
[] : results reviewed [de-identified] : ALT 76, chronic, glucose 260, hba1c 8.7 [de-identified] : EKG- NSR 87 bpm no st t changes

## 2019-05-13 ENCOUNTER — APPOINTMENT (OUTPATIENT)
Dept: FAMILY MEDICINE | Facility: CLINIC | Age: 64
End: 2019-05-13
Payer: MEDICARE

## 2019-05-13 VITALS
DIASTOLIC BLOOD PRESSURE: 78 MMHG | WEIGHT: 210 LBS | OXYGEN SATURATION: 98 % | HEIGHT: 64 IN | TEMPERATURE: 97.9 F | HEART RATE: 86 BPM | SYSTOLIC BLOOD PRESSURE: 112 MMHG | BODY MASS INDEX: 35.85 KG/M2

## 2019-05-13 DIAGNOSIS — Z86.69 PERSONAL HISTORY OF OTHER DISEASES OF THE NERVOUS SYSTEM AND SENSE ORGANS: ICD-10-CM

## 2019-05-13 DIAGNOSIS — J32.9 CHRONIC SINUSITIS, UNSPECIFIED: ICD-10-CM

## 2019-05-13 PROCEDURE — 99213 OFFICE O/P EST LOW 20 MIN: CPT

## 2019-05-13 NOTE — HISTORY OF PRESENT ILLNESS
[FreeTextEntry8] : 63 year old female complaining of ear pain, headache/pressure, congestion, cough with green sputum after her trip to [place] last week. States symptoms started about 5 days ago. They have been getting worse. Has not been taking medication.

## 2019-05-13 NOTE — PHYSICAL EXAM
[No Acute Distress] : no acute distress [Well-Appearing] : well-appearing [Normal Sclera/Conjunctiva] : normal sclera/conjunctiva [PERRL] : pupils equal round and reactive to light [No Lymphadenopathy] : no lymphadenopathy [Supple] : supple [Normal Rate] : normal rate  [Regular Rhythm] : with a regular rhythm [de-identified] : erythematous bulging TM bl, erythematous oropharynx . + TTP to frontal sinus [de-identified] : + course BS with mild wheeze

## 2019-05-13 NOTE — REVIEW OF SYSTEMS
[Fever] : no fever [Chills] : no chills [Fatigue] : fatigue [Discharge] : no discharge [Vision Problems] : no vision problems [Earache] : earache [Nasal Discharge] : nasal discharge [Chest Pain] : no chest pain [Palpitations] : no palpitations [Sore Throat] : sore throat [Shortness Of Breath] : no shortness of breath [Wheezing] : no wheezing [Cough] : cough [Abdominal Pain] : no abdominal pain [Nausea] : no nausea [Diarrhea] : diarrhea [Headache] : headache [Vomiting] : no vomiting [Dizziness] : no dizziness

## 2019-05-13 NOTE — PLAN
[FreeTextEntry1] : Otitis media with sinusitis - start augmentin BID, flonase and decongestant. Advised to cont supportive measures including hydration, tea with honey, salt water gargles. f/u if no relief

## 2019-05-29 ENCOUNTER — RX RENEWAL (OUTPATIENT)
Age: 64
End: 2019-05-29

## 2019-06-02 ENCOUNTER — RX RENEWAL (OUTPATIENT)
Age: 64
End: 2019-06-02

## 2019-06-13 ENCOUNTER — APPOINTMENT (OUTPATIENT)
Dept: MRI IMAGING | Facility: CLINIC | Age: 64
End: 2019-06-13
Payer: MEDICARE

## 2019-06-13 ENCOUNTER — OUTPATIENT (OUTPATIENT)
Dept: OUTPATIENT SERVICES | Facility: HOSPITAL | Age: 64
LOS: 1 days | End: 2019-06-13
Payer: MEDICARE

## 2019-06-13 DIAGNOSIS — Z98.89 OTHER SPECIFIED POSTPROCEDURAL STATES: Chronic | ICD-10-CM

## 2019-06-13 DIAGNOSIS — M75.120 COMPLETE ROTATOR CUFF TEAR OR RUPTURE OF UNSPECIFIED SHOULDER, NOT SPECIFIED AS TRAUMATIC: Chronic | ICD-10-CM

## 2019-06-13 DIAGNOSIS — K46.9 UNSPECIFIED ABDOMINAL HERNIA WITHOUT OBSTRUCTION OR GANGRENE: Chronic | ICD-10-CM

## 2019-06-13 DIAGNOSIS — Z98.84 BARIATRIC SURGERY STATUS: Chronic | ICD-10-CM

## 2019-06-13 DIAGNOSIS — Z96.653 PRESENCE OF ARTIFICIAL KNEE JOINT, BILATERAL: Chronic | ICD-10-CM

## 2019-06-13 DIAGNOSIS — L02.92 FURUNCLE, UNSPECIFIED: Chronic | ICD-10-CM

## 2019-06-13 DIAGNOSIS — Z00.8 ENCOUNTER FOR OTHER GENERAL EXAMINATION: ICD-10-CM

## 2019-06-13 DIAGNOSIS — Z41.1 ENCOUNTER FOR COSMETIC SURGERY: Chronic | ICD-10-CM

## 2019-06-13 PROCEDURE — 72141 MRI NECK SPINE W/O DYE: CPT

## 2019-06-13 PROCEDURE — 72141 MRI NECK SPINE W/O DYE: CPT | Mod: 26

## 2019-06-24 ENCOUNTER — APPOINTMENT (OUTPATIENT)
Dept: VASCULAR SURGERY | Facility: CLINIC | Age: 64
End: 2019-06-24
Payer: MEDICARE

## 2019-06-24 VITALS
DIASTOLIC BLOOD PRESSURE: 87 MMHG | HEART RATE: 80 BPM | HEIGHT: 64 IN | BODY MASS INDEX: 35.85 KG/M2 | TEMPERATURE: 97.9 F | SYSTOLIC BLOOD PRESSURE: 149 MMHG | WEIGHT: 210 LBS | OXYGEN SATURATION: 96 %

## 2019-06-24 PROCEDURE — 93970 EXTREMITY STUDY: CPT

## 2019-06-24 PROCEDURE — 99203 OFFICE O/P NEW LOW 30 MIN: CPT

## 2019-06-24 NOTE — PHYSICAL EXAM
[Normal Breath Sounds] : Normal breath sounds [Abdominal Aorta] : Normal abdominal aorta [2+] : left 2+ [Ankle Swelling (On Exam)] : present [Ankle Swelling Bilaterally] : bilaterally  [Varicose Veins Of Lower Extremities] : bilaterally [Ankle Swelling On The Right] : mild [No Rash or Lesion] : No rash or lesion [Oriented to Person] : oriented to person [Alert] : alert [Oriented to Time] : oriented to time [Oriented to Place] : oriented to place [Calm] : calm [JVD] : no jugular venous distention  [Carotid Bruits] : no carotid bruits [] : not present [Abdomen Masses] : No abdominal masses [de-identified] : Well appearing, obese [de-identified] : Supple [FreeTextEntry1] : Prominent spider veins in bilateral thighs and calves with multiple areas of bruising

## 2019-06-24 NOTE — HISTORY OF PRESENT ILLNESS
[FreeTextEntry1] : 63 year old female presents for evaluation of bilateral lower extremity swelling and pain with prolonged sitting and standing. She has prominent spider veins and has frequent bruising, redness and burning pain over these spider veins in both legs.\par She reports to have had previous stab phlebectomies and 'laser procedures' to both legs about 15 years ago. Her symptoms improved after the procedures but have now worsened over the last year. She wears knee high compression stocking s daily but they do not provide any relief.\par She denies lower extremity claudication and is an avid walker. She quit smoking 16 years ago.\par No previous DVT

## 2019-06-24 NOTE — ASSESSMENT
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 She has no DVT. There is popliteal reflux bilaterally\par She has failed conservative management and will benefit from bilateral lower extremity spider vein sclerotherapy.\par I have discussed the risks and benefits of sclerotherapy and she wishes to proceed\par

## 2019-07-26 ENCOUNTER — APPOINTMENT (OUTPATIENT)
Dept: ORTHOPEDIC SURGERY | Facility: CLINIC | Age: 64
End: 2019-07-26
Payer: MEDICARE

## 2019-07-26 PROCEDURE — 73562 X-RAY EXAM OF KNEE 3: CPT | Mod: 50

## 2019-07-26 PROCEDURE — 99214 OFFICE O/P EST MOD 30 MIN: CPT

## 2019-07-26 NOTE — HISTORY OF PRESENT ILLNESS
[de-identified] : Bilateral knee pain - s/p TKR 08/11/10 and 8/18/10 by Dr Allen.\par Revision of the Right total knee replacement with patellar resurfacing DOS: Jan 8, 2015. \par Patient is coming for followup of bilateral knee pain left more than right. Patient stated that she had no recent injury. No swelling. No buckling. She had difficulties getting from a seated position. Climbing stairs. She does not use any assistive devices. She does not use anti-inflammatory medication. She has no pain related to either hips. She reports a recent shoulder surgery.

## 2019-07-26 NOTE — PHYSICAL EXAM
[UE/LE] : Sensory: Intact in bilateral upper & lower extremities [ALL] : dorsalis pedis, posterior tibial, femoral, popliteal, and radial 2+ and symmetric bilaterally [Acute Distress] : not in acute distress [Poor Appearance] : well-appearing [de-identified] : x-ray obtained today of both knees show status postbilateral TKR. No implant bone interface lucencies. Joint space is symmetrical bilateral. The patella and right knees resurfaced. The patella and the left knees undersurface. [Normal] : Oriented to person, place, and time, insight and judgement were intact and the affect was normal [de-identified] : Examination of both knees reveal that the skin is clean and dry. There is no erythema or edema. Examination of the right knee reveal a well healed medial scar. Passive range of motion is full extension and flexion to 130° easily. The knee is stable to medial and lateral stress. There is no effusion. There is no patellar tenderness. Examination of the left knee reveals no local warmth. No erythema. There is a small joint effusion. There is patellofemoral crepitus and a positive apprehension test. The knee can be taken through a range of motion with full extension and flexion to 130°. The knee is stable to both medial and lateral stress in extension and flexion. Quadricep muscle strength in both legs is 5 over 5

## 2019-07-26 NOTE — DISCUSSION/SUMMARY
[Medication Risks Reviewed] : Medication risks reviewed [de-identified] : In my opinion patient has left knee pain related to undersurface patella. At this point the patient is planned to go on vacation in September. I advised him to physical therapy and a short course of anti-inflammatory medication. If her symptoms will improve she will be followup as needed. If his symptoms will progress the patient understand that she may need to have patella resurfacing procedure. I answered all the patient questions to his satisfaction.

## 2019-08-02 ENCOUNTER — RX RENEWAL (OUTPATIENT)
Age: 64
End: 2019-08-02

## 2019-08-13 ENCOUNTER — APPOINTMENT (OUTPATIENT)
Dept: FAMILY MEDICINE | Facility: CLINIC | Age: 64
End: 2019-08-13
Payer: MEDICARE

## 2019-08-13 VITALS
TEMPERATURE: 98.3 F | RESPIRATION RATE: 16 BRPM | DIASTOLIC BLOOD PRESSURE: 60 MMHG | BODY MASS INDEX: 35.85 KG/M2 | SYSTOLIC BLOOD PRESSURE: 110 MMHG | WEIGHT: 210 LBS | HEART RATE: 82 BPM | OXYGEN SATURATION: 98 % | HEIGHT: 64 IN

## 2019-08-13 DIAGNOSIS — R10.11 RIGHT UPPER QUADRANT PAIN: ICD-10-CM

## 2019-08-13 DIAGNOSIS — R10.12 RIGHT UPPER QUADRANT PAIN: ICD-10-CM

## 2019-08-13 LAB
ALBUMIN SERPL ELPH-MCNC: 4.1 G/DL
ALP BLD-CCNC: 98 U/L
ALT SERPL-CCNC: 37 U/L
ANION GAP SERPL CALC-SCNC: 12 MMOL/L
AST SERPL-CCNC: 28 U/L
BASOPHILS # BLD AUTO: 0.04 K/UL
BASOPHILS NFR BLD AUTO: 0.6 %
BILIRUB SERPL-MCNC: 0.2 MG/DL
BILIRUB UR QL STRIP: NORMAL
BUN SERPL-MCNC: 14 MG/DL
CALCIUM SERPL-MCNC: 9.4 MG/DL
CHLORIDE SERPL-SCNC: 101 MMOL/L
CHOLEST SERPL-MCNC: 169 MG/DL
CHOLEST/HDLC SERPL: 3.1 RATIO
CO2 SERPL-SCNC: 28 MMOL/L
CREAT SERPL-MCNC: 0.52 MG/DL
EOSINOPHIL # BLD AUTO: 0.07 K/UL
EOSINOPHIL NFR BLD AUTO: 1 %
ESTIMATED AVERAGE GLUCOSE: 197 MG/DL
GLUCOSE SERPL-MCNC: 274 MG/DL
GLUCOSE UR-MCNC: 250
HBA1C MFR BLD HPLC: 8.5 %
HCG UR QL: 0.2 EU/DL
HCT VFR BLD CALC: 38.9 %
HDLC SERPL-MCNC: 54 MG/DL
HGB BLD-MCNC: 11.5 G/DL
HGB UR QL STRIP.AUTO: NEGATIVE
IMM GRANULOCYTES NFR BLD AUTO: 1.3 %
KETONES UR-MCNC: NORMAL
LDLC SERPL CALC-MCNC: 88 MG/DL
LEUKOCYTE ESTERASE UR QL STRIP: NORMAL
LYMPHOCYTES # BLD AUTO: 1.38 K/UL
LYMPHOCYTES NFR BLD AUTO: 19.6 %
MAN DIFF?: NORMAL
MCHC RBC-ENTMCNC: 29.4 PG
MCHC RBC-ENTMCNC: 29.6 GM/DL
MCV RBC AUTO: 99.5 FL
MONOCYTES # BLD AUTO: 0.55 K/UL
MONOCYTES NFR BLD AUTO: 7.8 %
NEUTROPHILS # BLD AUTO: 4.92 K/UL
NEUTROPHILS NFR BLD AUTO: 69.7 %
NITRITE UR QL STRIP: POSITIVE
PH UR STRIP: 5.5
PLATELET # BLD AUTO: 196 K/UL
POTASSIUM SERPL-SCNC: 4.7 MMOL/L
PROT SERPL-MCNC: 6.5 G/DL
PROT UR STRIP-MCNC: 30
RBC # BLD: 3.91 M/UL
RBC # FLD: 14.9 %
SODIUM SERPL-SCNC: 141 MMOL/L
SP GR UR STRIP: >=1.03
T4 FREE SERPL-MCNC: 1.4 NG/DL
TRIGL SERPL-MCNC: 134 MG/DL
TSH SERPL-ACNC: 3.89 UIU/ML
WBC # FLD AUTO: 7.05 K/UL

## 2019-08-13 PROCEDURE — 81003 URINALYSIS AUTO W/O SCOPE: CPT | Mod: QW

## 2019-08-13 PROCEDURE — 99214 OFFICE O/P EST MOD 30 MIN: CPT | Mod: 25

## 2019-08-14 ENCOUNTER — RX RENEWAL (OUTPATIENT)
Age: 64
End: 2019-08-14

## 2019-08-14 NOTE — REVIEW OF SYSTEMS
[Chills] : chills [Fatigue] : fatigue [Abdominal Pain] : abdominal pain [Dysuria] : dysuria [Negative] : Psychiatric

## 2019-08-16 ENCOUNTER — APPOINTMENT (OUTPATIENT)
Dept: ORTHOPEDIC SURGERY | Facility: CLINIC | Age: 64
End: 2019-08-16
Payer: MEDICARE

## 2019-08-16 VITALS
SYSTOLIC BLOOD PRESSURE: 136 MMHG | HEIGHT: 64 IN | HEART RATE: 88 BPM | BODY MASS INDEX: 35.85 KG/M2 | WEIGHT: 210 LBS | DIASTOLIC BLOOD PRESSURE: 86 MMHG

## 2019-08-16 DIAGNOSIS — Z96.659 PRESENCE OF UNSPECIFIED ARTIFICIAL KNEE JOINT: ICD-10-CM

## 2019-08-16 PROCEDURE — 99214 OFFICE O/P EST MOD 30 MIN: CPT

## 2019-08-17 NOTE — HISTORY OF PRESENT ILLNESS
[de-identified] : Patient presents today for evaluation of left knee pain. She reports with persistent anterior knee pain. She is scheduled to go on vacation to [place] in the next 2 weeks. She reports that she would like to help in reducing her knee pain. She is status post total knee arthroplasty with an undersurface patella. She reports that she is tried diclofenac. She believes at this raise her blood sugar. She's there had been experiencing this before. She is unable to take anti-inflammatories regularly because she is ulcerative colitis and Crohn's disease. She continues to use Voltaren gel with modest improvement. She does have a neoprene sleeves at home. She intermittently uses them. She does not have any relief with use of Tylenol. She denies any recent knee injuries or trauma. She reports of difficulties walking long distances. No other related complaints.\par \par Review of Systems-\par Constitutional: No fever or chills. \par Cardiovascular: No orthopnea or chest pain\par Pulmonary: No shortness of breath. \par GI: No nausea or vomiting or abdominal pain.\par Musculoskeletal: see HPI \par Psychiatric: No anxiety and depression.

## 2019-08-17 NOTE — DISCUSSION/SUMMARY
[de-identified] : The treatment options were reviewed with the patient. Given the patient's possible unexplained reaction to the diclofenac, she is not recommended to continue this. Even a history of ulcerative colitis and Crohn's, she is recommended to avoid anti-inflammatories. Unfortunately, the patient is recommended to continue the Voltaren shown use of the neoprene sleeve. She'll modify her activities upon her trip to [place]. She has undergone trying CABG well. She'll do further research and consider this as an option. She is recommended to return back to the office to discuss possible patella resurfacing upon completion of her trip. She reports that she had the patella resurfaced on the right knee and that she does have pain that is similar. She is unsure if this will improve her quality of life. The patient was reports of other social issues at home. She'll continue to dress these. She'll be seen back in future as needed.

## 2019-08-17 NOTE — PHYSICAL EXAM
[de-identified] : The patient appears well nourished and in no apparent distress. The patient is alert and oriented to person, place, and time. Affect and mood appear normal. The head is normocephalic and atraumatic. Skin shows normal turgor with no evidence of eczema or psoriasis. No respiratory distress noted. Sensation grossly intact. MUSCULOSKELETAL:   SEE BELOW\par \par Left knee exam demonstrates well-healed surgical incision consistent with past hemiarthroplasty. No signs of acute trauma. Mild soft tissue swelling. No effusion. No warmth. No erythema. No calf tenderness. There is some tenderness of the medial knee. There is no laxity in extension. No anterior posterior drawer. Past range of motion is zero to approximately 110°. Normal sensation to light touch distally. 2+ DP pulse.

## 2019-08-20 LAB — BACTERIA UR CULT: ABNORMAL

## 2019-08-21 ENCOUNTER — FORM ENCOUNTER (OUTPATIENT)
Age: 64
End: 2019-08-21

## 2019-08-22 ENCOUNTER — OUTPATIENT (OUTPATIENT)
Dept: OUTPATIENT SERVICES | Facility: HOSPITAL | Age: 64
LOS: 1 days | End: 2019-08-22
Payer: MEDICARE

## 2019-08-22 ENCOUNTER — APPOINTMENT (OUTPATIENT)
Dept: CT IMAGING | Facility: CLINIC | Age: 64
End: 2019-08-22
Payer: MEDICARE

## 2019-08-22 DIAGNOSIS — Z41.1 ENCOUNTER FOR COSMETIC SURGERY: Chronic | ICD-10-CM

## 2019-08-22 DIAGNOSIS — Z98.89 OTHER SPECIFIED POSTPROCEDURAL STATES: Chronic | ICD-10-CM

## 2019-08-22 DIAGNOSIS — M75.120 COMPLETE ROTATOR CUFF TEAR OR RUPTURE OF UNSPECIFIED SHOULDER, NOT SPECIFIED AS TRAUMATIC: Chronic | ICD-10-CM

## 2019-08-22 DIAGNOSIS — L02.92 FURUNCLE, UNSPECIFIED: Chronic | ICD-10-CM

## 2019-08-22 DIAGNOSIS — R10.11 RIGHT UPPER QUADRANT PAIN: ICD-10-CM

## 2019-08-22 DIAGNOSIS — Z98.84 BARIATRIC SURGERY STATUS: Chronic | ICD-10-CM

## 2019-08-22 DIAGNOSIS — K46.9 UNSPECIFIED ABDOMINAL HERNIA WITHOUT OBSTRUCTION OR GANGRENE: Chronic | ICD-10-CM

## 2019-08-22 DIAGNOSIS — Z96.653 PRESENCE OF ARTIFICIAL KNEE JOINT, BILATERAL: Chronic | ICD-10-CM

## 2019-08-22 PROCEDURE — 74176 CT ABD & PELVIS W/O CONTRAST: CPT

## 2019-08-22 PROCEDURE — 74176 CT ABD & PELVIS W/O CONTRAST: CPT | Mod: 26

## 2019-08-31 ENCOUNTER — APPOINTMENT (OUTPATIENT)
Dept: FAMILY MEDICINE | Facility: CLINIC | Age: 64
End: 2019-08-31
Payer: MEDICARE

## 2019-08-31 ENCOUNTER — RX RENEWAL (OUTPATIENT)
Age: 64
End: 2019-08-31

## 2019-08-31 VITALS
OXYGEN SATURATION: 98 % | BODY MASS INDEX: 35.85 KG/M2 | SYSTOLIC BLOOD PRESSURE: 122 MMHG | WEIGHT: 210 LBS | TEMPERATURE: 98.1 F | HEIGHT: 64 IN | DIASTOLIC BLOOD PRESSURE: 90 MMHG | HEART RATE: 91 BPM

## 2019-08-31 DIAGNOSIS — S12.300A UNSPECIFIED DISPLACED FRACTURE OF FOURTH CERVICAL VERTEBRA, INITIAL ENCOUNTER FOR CLOSED FRACTURE: ICD-10-CM

## 2019-08-31 PROCEDURE — 82947 ASSAY GLUCOSE BLOOD QUANT: CPT | Mod: QW

## 2019-08-31 PROCEDURE — 99214 OFFICE O/P EST MOD 30 MIN: CPT | Mod: 25

## 2019-09-01 LAB — GLUCOSE BLDC GLUCOMTR-MCNC: 218

## 2019-09-01 NOTE — ASSESSMENT
[FreeTextEntry1] : DM- cont metformin, inc januvia to 100mg q day, add on glimepiride. will rx glucometer\par C4 cervical fx - cont tylenol prn pain, cont use of neck brace. f/u with neurosurgeon\par poison ivy - start clobetasol topically bid

## 2019-09-01 NOTE — HISTORY OF PRESENT ILLNESS
[FreeTextEntry8] : Pt f/u cervical fx, DM, and c/o rash. She fell 5 days ago after knee locked up and pt tripped forward into a door hitting her head. Pt went to [facility] imaging showed hairline C4/C5 fx. Pt in neck brace. Pain controlled on tylenol. Pt has f/u appt w/ neurosurgeon in 3 weeks. Glucose was running 300+ and she was on insulin in hospital. \par Pt has poison ivy on arms and neck w/ pruritis. otc meds have not helped.

## 2019-09-03 ENCOUNTER — RX RENEWAL (OUTPATIENT)
Age: 64
End: 2019-09-03

## 2019-09-03 RX ORDER — BLOOD-GLUCOSE METER
KIT MISCELLANEOUS
Qty: 1 | Refills: 0 | Status: ACTIVE | COMMUNITY
Start: 2019-09-03 | End: 1900-01-01

## 2019-09-03 RX ORDER — BLOOD-GLUCOSE METER
EACH MISCELLANEOUS
Qty: 1 | Refills: 1 | Status: ACTIVE | COMMUNITY
Start: 2019-09-03 | End: 1900-01-01

## 2019-09-04 ENCOUNTER — RX RENEWAL (OUTPATIENT)
Age: 64
End: 2019-09-04

## 2019-10-01 ENCOUNTER — RX RENEWAL (OUTPATIENT)
Age: 64
End: 2019-10-01

## 2019-10-21 ENCOUNTER — APPOINTMENT (OUTPATIENT)
Dept: FAMILY MEDICINE | Facility: CLINIC | Age: 64
End: 2019-10-21
Payer: MEDICARE

## 2019-10-21 VITALS
BODY MASS INDEX: 32.04 KG/M2 | OXYGEN SATURATION: 93 % | DIASTOLIC BLOOD PRESSURE: 70 MMHG | WEIGHT: 190 LBS | HEART RATE: 69 BPM | SYSTOLIC BLOOD PRESSURE: 114 MMHG | HEIGHT: 64.5 IN

## 2019-10-21 DIAGNOSIS — R74.8 ABNORMAL LEVELS OF OTHER SERUM ENZYMES: ICD-10-CM

## 2019-10-21 PROCEDURE — 99495 TRANSJ CARE MGMT MOD F2F 14D: CPT

## 2019-10-22 NOTE — ASSESSMENT
[FreeTextEntry1] : humeral fx - refill oxycodone 10mg q4 hrs prn severe pain. Pt given 7 day supply, will give add'l 1-2 refills until pt is able to f/u with pain mgmt\par DM - cont meds, check hba1c\par hx elevated LFTs - check cmp\par obesity - improved, has lost 20 lbs weight in rehab. cont following healthy portion and carb controlled diet

## 2019-10-22 NOTE — PHYSICAL EXAM
[Normal] : normal rate, regular rhythm, normal S1 and S2 and no murmur heard [Soft] : abdomen soft [Non Tender] : non-tender [Non-distended] : non-distended [Normal Insight/Judgement] : insight and judgment were intact [de-identified] : good capillary refill b/l [de-identified] : L upper humerus severe tenderness/pain, arm in sling [de-identified] : tearful on exam [43262 - Moderate Complexity requires multiple possible diagnoses and/or the management options, moderate complexity of the medical data (tests, etc.) to be reviewed, and moderate risk of significant complications, morbidity, and/or mortality as well as co] : Moderate Complexity

## 2019-10-22 NOTE — HISTORY OF PRESENT ILLNESS
[Post-hospitalization from ___ Hospital] : Post-hospitalization from [unfilled] Hospital [Admitted on: ___] : The patient was admitted on [unfilled] [Discharged on ___] : discharged on [unfilled] [Discharge Summary] : discharge summary [Radiology Findings] : radiology findings [Med Reconciliation] : medication reconciliation has been completed [Patient Contacted By: ____] : and contacted by [unfilled] [FreeTextEntry2] : Pt s/p fall on 10/2/19 which led to L humeral shaft fx. Pt discharged from rehab on 10/17/19. Pt using oxycodone 10mg q 6 hours. Pt in severe pain since discharge. Pt has started home PT 2x a week. Pt has appt w/ ortho Dr. Marley Wen 10/25/19. Pt to f/u with pain mgmt

## 2019-10-25 ENCOUNTER — CLINICAL ADVICE (OUTPATIENT)
Age: 64
End: 2019-10-25

## 2019-10-25 LAB
ALBUMIN SERPL ELPH-MCNC: 4.5 G/DL
ALP BLD-CCNC: 136 U/L
ALT SERPL-CCNC: 38 U/L
ANION GAP SERPL CALC-SCNC: 14 MMOL/L
AST SERPL-CCNC: 31 U/L
BILIRUB SERPL-MCNC: 0.3 MG/DL
BUN SERPL-MCNC: 13 MG/DL
CALCIUM SERPL-MCNC: 10.1 MG/DL
CHLORIDE SERPL-SCNC: 102 MMOL/L
CO2 SERPL-SCNC: 28 MMOL/L
CREAT SERPL-MCNC: 0.61 MG/DL
ESTIMATED AVERAGE GLUCOSE: 103 MG/DL
GLUCOSE SERPL-MCNC: 96 MG/DL
HBA1C MFR BLD HPLC: 5.2 %
POTASSIUM SERPL-SCNC: 4.1 MMOL/L
PROT SERPL-MCNC: 7.1 G/DL
SODIUM SERPL-SCNC: 144 MMOL/L

## 2019-10-29 ENCOUNTER — APPOINTMENT (OUTPATIENT)
Dept: FAMILY MEDICINE | Facility: CLINIC | Age: 64
End: 2019-10-29
Payer: MEDICARE

## 2019-10-29 VITALS
HEART RATE: 92 BPM | OXYGEN SATURATION: 97 % | WEIGHT: 190 LBS | BODY MASS INDEX: 32.04 KG/M2 | HEIGHT: 64.5 IN | SYSTOLIC BLOOD PRESSURE: 128 MMHG | RESPIRATION RATE: 16 BRPM | DIASTOLIC BLOOD PRESSURE: 82 MMHG

## 2019-10-29 LAB
BILIRUB UR QL STRIP: NEGATIVE
GLUCOSE UR-MCNC: NEGATIVE
HCG UR QL: 1 EU/DL
HGB UR QL STRIP.AUTO: NORMAL
KETONES UR-MCNC: NEGATIVE
LEUKOCYTE ESTERASE UR QL STRIP: NORMAL
NITRITE UR QL STRIP: NEGATIVE
PH UR STRIP: 6
PROT UR STRIP-MCNC: NORMAL
SP GR UR STRIP: >=1.03

## 2019-10-29 PROCEDURE — 81003 URINALYSIS AUTO W/O SCOPE: CPT | Mod: QW

## 2019-10-29 PROCEDURE — 99213 OFFICE O/P EST LOW 20 MIN: CPT | Mod: 25

## 2019-10-29 RX ORDER — AMOXICILLIN AND CLAVULANATE POTASSIUM 875; 125 MG/1; MG/1
875-125 TABLET, COATED ORAL
Qty: 14 | Refills: 0 | Status: COMPLETED | COMMUNITY
Start: 2019-05-13 | End: 2019-10-29

## 2019-10-29 NOTE — PHYSICAL EXAM
[No Acute Distress] : no acute distress [Well-Appearing] : well-appearing [Normal Sclera/Conjunctiva] : normal sclera/conjunctiva [PERRL] : pupils equal round and reactive to light [No Respiratory Distress] : no respiratory distress  [Normal Rate] : normal rate  [No CVA Tenderness] : no CVA  tenderness

## 2019-10-29 NOTE — REVIEW OF SYSTEMS
[Fever] : no fever [Chills] : no chills [Fatigue] : no fatigue [Chest Pain] : no chest pain [Palpitations] : no palpitations [Shortness Of Breath] : no shortness of breath [Wheezing] : no wheezing [Cough] : no cough [Dysuria] : dysuria [Incontinence] : no incontinence [Hematuria] : no hematuria [Frequency] : frequency [Vaginal Discharge] : no vaginal discharge [Back Pain] : no back pain [Headache] : no headache

## 2019-10-29 NOTE — HISTORY OF PRESENT ILLNESS
[FreeTextEntry8] : 64 year old female complaining of 2 day hx of increased urinary frequency and burning. No back pain, fever or chills.

## 2019-10-31 DIAGNOSIS — Z86.19 PERSONAL HISTORY OF OTHER INFECTIOUS AND PARASITIC DISEASES: ICD-10-CM

## 2019-10-31 LAB — BACTERIA UR CULT: NORMAL

## 2020-01-07 ENCOUNTER — APPOINTMENT (OUTPATIENT)
Dept: FAMILY MEDICINE | Facility: CLINIC | Age: 65
End: 2020-01-07
Payer: MEDICARE

## 2020-01-07 VITALS
HEART RATE: 79 BPM | OXYGEN SATURATION: 98 % | WEIGHT: 209 LBS | DIASTOLIC BLOOD PRESSURE: 70 MMHG | RESPIRATION RATE: 16 BRPM | BODY MASS INDEX: 35.25 KG/M2 | HEIGHT: 64.5 IN | SYSTOLIC BLOOD PRESSURE: 100 MMHG

## 2020-01-07 DIAGNOSIS — R47.01 APHASIA: ICD-10-CM

## 2020-01-07 PROCEDURE — 36415 COLL VENOUS BLD VENIPUNCTURE: CPT

## 2020-01-07 PROCEDURE — 99214 OFFICE O/P EST MOD 30 MIN: CPT | Mod: 25

## 2020-01-08 NOTE — ASSESSMENT
[FreeTextEntry1] : memory changes, aphasia - MMSE 22/30 indicative of mild cognitive impairment. check MRI brain, labs\par DM- check hba1c, refill meds. if hba1c well controlled will consider decreasing dosage of meds\par hypothyroidism - refill levothyroxine, check TFTs\par asthma- refill singulair

## 2020-01-08 NOTE — PHYSICAL EXAM
[Normal] : affect was normal and insight and judgment were intact [de-identified] : humeral/shoulder pain w/ flexion at 90 deg [de-identified] : MMSE 22/30

## 2020-01-08 NOTE — HISTORY OF PRESENT ILLNESS
[de-identified] : Pt c/o perceived memory loss issues and inc in aphasia. Pt c/o both short and long term memory loss chronically, aphasia worse in past few months. Pt reports word recall is poor and is worried about dementia. Pt still highly functioning, managing her finances and cooking for herself and her mother.\par Pt f/u DM, asthma, hypothyroidism. Pt c/o feeling dizzy at times when taking DM meds. Pt needs refill of meds.

## 2020-01-09 LAB
ALBUMIN SERPL ELPH-MCNC: 4.1 G/DL
ALP BLD-CCNC: 101 U/L
ALT SERPL-CCNC: 20 U/L
ANION GAP SERPL CALC-SCNC: 12 MMOL/L
AST SERPL-CCNC: 17 U/L
BASOPHILS # BLD AUTO: 0.05 K/UL
BASOPHILS NFR BLD AUTO: 0.6 %
BILIRUB SERPL-MCNC: 0.2 MG/DL
BUN SERPL-MCNC: 18 MG/DL
CALCIUM SERPL-MCNC: 9.6 MG/DL
CHLORIDE SERPL-SCNC: 101 MMOL/L
CO2 SERPL-SCNC: 26 MMOL/L
CREAT SERPL-MCNC: 0.46 MG/DL
EOSINOPHIL # BLD AUTO: 0.07 K/UL
EOSINOPHIL NFR BLD AUTO: 0.8 %
ESTIMATED AVERAGE GLUCOSE: 171 MG/DL
FOLATE SERPL-MCNC: 4.8 NG/ML
GLUCOSE SERPL-MCNC: 236 MG/DL
HBA1C MFR BLD HPLC: 7.6 %
HCT VFR BLD CALC: 38.1 %
HGB BLD-MCNC: 11.8 G/DL
IMM GRANULOCYTES NFR BLD AUTO: 0.8 %
LYMPHOCYTES # BLD AUTO: 1.48 K/UL
LYMPHOCYTES NFR BLD AUTO: 17.3 %
MAN DIFF?: NORMAL
MCHC RBC-ENTMCNC: 29.4 PG
MCHC RBC-ENTMCNC: 31 GM/DL
MCV RBC AUTO: 94.8 FL
MONOCYTES # BLD AUTO: 0.57 K/UL
MONOCYTES NFR BLD AUTO: 6.7 %
NEUTROPHILS # BLD AUTO: 6.33 K/UL
NEUTROPHILS NFR BLD AUTO: 73.8 %
PLATELET # BLD AUTO: 194 K/UL
POTASSIUM SERPL-SCNC: 4.4 MMOL/L
PROT SERPL-MCNC: 6.6 G/DL
RBC # BLD: 4.02 M/UL
RBC # FLD: 13.7 %
SODIUM SERPL-SCNC: 139 MMOL/L
T4 FREE SERPL-MCNC: 1.4 NG/DL
TSH SERPL-ACNC: 2.79 UIU/ML
VIT B12 SERPL-MCNC: 667 PG/ML
WBC # FLD AUTO: 8.57 K/UL

## 2020-01-23 ENCOUNTER — APPOINTMENT (OUTPATIENT)
Dept: FAMILY MEDICINE | Facility: CLINIC | Age: 65
End: 2020-01-23
Payer: MEDICARE

## 2020-01-23 VITALS
WEIGHT: 208 LBS | HEIGHT: 64 IN | BODY MASS INDEX: 35.51 KG/M2 | SYSTOLIC BLOOD PRESSURE: 110 MMHG | TEMPERATURE: 97.9 F | HEART RATE: 63 BPM | DIASTOLIC BLOOD PRESSURE: 68 MMHG | OXYGEN SATURATION: 98 %

## 2020-01-23 PROCEDURE — G0444 DEPRESSION SCREEN ANNUAL: CPT | Mod: 59

## 2020-01-23 PROCEDURE — 99214 OFFICE O/P EST MOD 30 MIN: CPT | Mod: 25

## 2020-01-23 NOTE — ASSESSMENT
[FreeTextEntry1] : maxillary sinusitis -Increase po fluid intake to maintain adequate hydration. Rest. Start course of doxyxycline bid\par memory changes- refer to neuro\par surgical incision pain - refer to gen surg for possible revision

## 2020-01-23 NOTE — HISTORY OF PRESENT ILLNESS
[FreeTextEntry8] : Pt c/o b/l sinus maxillary pressure, R facial redness irritation x 3 days. Symptoms worsening. Pt also vomiting at night as she feels a irritation in back of her throat. \par Pt c/o midline incisional abdominal hernia and pain chronically, worse in past several weeks. \par Pt also f/u memory problems. MRI brain grossly wnl w/ no acute findings but did show minimal non acute small vessel ischemic changes.\par Pt has chronic low back pain, pt seeing neurosurgeon and pt considering revision of her lumbar surgery.

## 2020-01-23 NOTE — PHYSICAL EXAM
[Soft] : abdomen soft [No HSM] : no HSM [Non-distended] : non-distended [No Rash] : no rash [Normal] : affect was normal and insight and judgment were intact [de-identified] : TTP b/l maxillary sinuses R>L, cobblestoning posterior oropharynx [de-identified] : midline incisional hernia pain/tenderness

## 2020-02-28 ENCOUNTER — APPOINTMENT (OUTPATIENT)
Dept: SURGERY | Facility: CLINIC | Age: 65
End: 2020-02-28
Payer: MEDICARE

## 2020-02-28 VITALS
DIASTOLIC BLOOD PRESSURE: 92 MMHG | SYSTOLIC BLOOD PRESSURE: 149 MMHG | BODY MASS INDEX: 37.73 KG/M2 | HEART RATE: 86 BPM | OXYGEN SATURATION: 97 % | WEIGHT: 221 LBS | TEMPERATURE: 97.9 F | HEIGHT: 64 IN

## 2020-02-28 DIAGNOSIS — L76.82 OTHER POSTPROCEDURAL COMPLICATIONS OF SKIN AND SUBCUTANEOUS TISSUE: ICD-10-CM

## 2020-02-28 PROCEDURE — 99215 OFFICE O/P EST HI 40 MIN: CPT

## 2020-02-28 PROCEDURE — 99204 OFFICE O/P NEW MOD 45 MIN: CPT

## 2020-02-28 NOTE — ASSESSMENT
[FreeTextEntry1] : The patient is an obese 64 year old female with a bulge of the upper abdominal wall with associated discomfort and pain with exertion.  There is no clear clinical evidence of recurrent incisional hernia.  The patient has a rectus diastasis of the upper midline.  I have suggested that based on the pain at the prior surgical site that we proceed with a CT scan to exclude a recurrent incisional hernia. The patient has been advised that weight loss will be an important adjunct to  reduce the risks hernia recurrence by potentially reducing the tension along the abdominal wall.  The patient will return upon completion of the study for further recommendations. \par

## 2020-02-28 NOTE — DATA REVIEWED
[FreeTextEntry1] : Independent review of an abd/pel CT scan from August 2019 reveals no evidence of recurrent incisional hernia, there is no bowel obstruction

## 2020-02-28 NOTE — HISTORY OF PRESENT ILLNESS
[de-identified] : The patient comes to the office in consultation by Dr John Reyes for evaluation of pain at the prior incision site and a bulge in the upper abdominal wall.  The patient states that she has been noticing a bulge with exertion in the upper abdominal area in the area of her prior surgery.  She has no nausea, no vomit, and no changes in her bowel function.  The patient reports that she has had numerous surgical procedures including a bariatric procedure, ex lap carole, and an open incisional hernia repair.

## 2020-02-28 NOTE — PHYSICAL EXAM
[JVD] : no jugular venous distention  [No Rash or Lesion] : No rash or lesion [Purpura] : no purpura  [Petechiae] : no petechiae [Alert] : alert [Skin Induration] : no induration [Skin Ulcer] : no ulcer [Oriented to Person] : oriented to person [Oriented to Time] : oriented to time [Oriented to Place] : oriented to place [Calm] : calm [de-identified] : non toxic, in no acute distress  [de-identified] : markedly obese and protuberant with no localizing tenderness, no guarding, no rebound, no masses  [de-identified] : trachea midline, no gross mass [de-identified] : no audible wheezing or stridor  [de-identified] : NC/AT PERRL EOMI no scleral icterus  [de-identified] : FROM of all extremities with no gross deformity or angulation, there is a diastasis rectus of the upper midline in the area of the prior incision site with no evidence of recurrent herniation, there are no inguinal hernias  [de-identified] : mood is calm  [de-identified] : surgical scars consistent with prior surgical history

## 2020-02-28 NOTE — CONSULT LETTER
[Dear  ___] : Dear  [unfilled], [( Thank you for referring [unfilled] for consultation for _____ )] : Thank you for referring [unfilled] for consultation for [unfilled] [Consult Letter:] : I had the pleasure of evaluating your patient, [unfilled]. [Consult Closing:] : Thank you very much for allowing me to participate in the care of this patient.  If you have any questions, please do not hesitate to contact me. [Sincerely,] : Sincerely, [Please see my note below.] : Please see my note below. [FreeTextEntry3] : Donte Bentley MD, FACS\par  of Surgery\par [facility]\par

## 2020-03-12 ENCOUNTER — FORM ENCOUNTER (OUTPATIENT)
Age: 65
End: 2020-03-12

## 2020-03-13 ENCOUNTER — APPOINTMENT (OUTPATIENT)
Dept: CT IMAGING | Facility: CLINIC | Age: 65
End: 2020-03-13
Payer: MEDICARE

## 2020-03-13 ENCOUNTER — OUTPATIENT (OUTPATIENT)
Dept: OUTPATIENT SERVICES | Facility: HOSPITAL | Age: 65
LOS: 1 days | End: 2020-03-13
Payer: MEDICARE

## 2020-03-13 DIAGNOSIS — Z98.89 OTHER SPECIFIED POSTPROCEDURAL STATES: Chronic | ICD-10-CM

## 2020-03-13 DIAGNOSIS — K46.9 UNSPECIFIED ABDOMINAL HERNIA WITHOUT OBSTRUCTION OR GANGRENE: ICD-10-CM

## 2020-03-13 DIAGNOSIS — Z96.653 PRESENCE OF ARTIFICIAL KNEE JOINT, BILATERAL: Chronic | ICD-10-CM

## 2020-03-13 DIAGNOSIS — Z41.1 ENCOUNTER FOR COSMETIC SURGERY: Chronic | ICD-10-CM

## 2020-03-13 DIAGNOSIS — K46.9 UNSPECIFIED ABDOMINAL HERNIA WITHOUT OBSTRUCTION OR GANGRENE: Chronic | ICD-10-CM

## 2020-03-13 DIAGNOSIS — M75.120 COMPLETE ROTATOR CUFF TEAR OR RUPTURE OF UNSPECIFIED SHOULDER, NOT SPECIFIED AS TRAUMATIC: Chronic | ICD-10-CM

## 2020-03-13 DIAGNOSIS — Z98.84 BARIATRIC SURGERY STATUS: Chronic | ICD-10-CM

## 2020-03-13 DIAGNOSIS — L02.92 FURUNCLE, UNSPECIFIED: Chronic | ICD-10-CM

## 2020-03-13 PROCEDURE — 74177 CT ABD & PELVIS W/CONTRAST: CPT | Mod: 26

## 2020-03-13 PROCEDURE — 82565 ASSAY OF CREATININE: CPT

## 2020-03-13 PROCEDURE — 74177 CT ABD & PELVIS W/CONTRAST: CPT

## 2020-03-30 ENCOUNTER — APPOINTMENT (OUTPATIENT)
Dept: FAMILY MEDICINE | Facility: CLINIC | Age: 65
End: 2020-03-30
Payer: MEDICARE

## 2020-03-30 ENCOUNTER — RX RENEWAL (OUTPATIENT)
Age: 65
End: 2020-03-30

## 2020-03-30 PROCEDURE — G2012 BRIEF CHECK IN BY MD/QHP: CPT

## 2020-04-19 ENCOUNTER — RX RENEWAL (OUTPATIENT)
Age: 65
End: 2020-04-19

## 2020-06-18 ENCOUNTER — APPOINTMENT (OUTPATIENT)
Dept: FAMILY MEDICINE | Facility: CLINIC | Age: 65
End: 2020-06-18
Payer: MEDICARE

## 2020-06-18 VITALS
HEART RATE: 84 BPM | DIASTOLIC BLOOD PRESSURE: 82 MMHG | OXYGEN SATURATION: 97 % | BODY MASS INDEX: 38.41 KG/M2 | WEIGHT: 225 LBS | SYSTOLIC BLOOD PRESSURE: 120 MMHG | HEIGHT: 64 IN

## 2020-06-18 PROCEDURE — 82947 ASSAY GLUCOSE BLOOD QUANT: CPT | Mod: QW

## 2020-06-18 PROCEDURE — 99214 OFFICE O/P EST MOD 30 MIN: CPT | Mod: 25

## 2020-06-22 LAB
25(OH)D3 SERPL-MCNC: 27.4 NG/ML
ALBUMIN SERPL ELPH-MCNC: 3.9 G/DL
ALP BLD-CCNC: 93 U/L
ALT SERPL-CCNC: 30 U/L
ANION GAP SERPL CALC-SCNC: 15 MMOL/L
AST SERPL-CCNC: 25 U/L
BASOPHILS # BLD AUTO: 0.02 K/UL
BASOPHILS NFR BLD AUTO: 0.4 %
BILIRUB SERPL-MCNC: 0.2 MG/DL
BUN SERPL-MCNC: 12 MG/DL
CALCIUM SERPL-MCNC: 9.1 MG/DL
CHLORIDE SERPL-SCNC: 100 MMOL/L
CHOLEST SERPL-MCNC: 167 MG/DL
CHOLEST/HDLC SERPL: 3.4 RATIO
CO2 SERPL-SCNC: 24 MMOL/L
CREAT SERPL-MCNC: 0.61 MG/DL
EOSINOPHIL # BLD AUTO: 0.12 K/UL
EOSINOPHIL NFR BLD AUTO: 2.2 %
ESTIMATED AVERAGE GLUCOSE: 171 MG/DL
GLUCOSE SERPL-MCNC: 332 MG/DL
HBA1C MFR BLD HPLC: 7.6 %
HCT VFR BLD CALC: 37.9 %
HDLC SERPL-MCNC: 50 MG/DL
HGB BLD-MCNC: 11.3 G/DL
IMM GRANULOCYTES NFR BLD AUTO: 1.3 %
LDLC SERPL CALC-MCNC: 85 MG/DL
LYMPHOCYTES # BLD AUTO: 1.24 K/UL
LYMPHOCYTES NFR BLD AUTO: 22.3 %
MAN DIFF?: NORMAL
MCHC RBC-ENTMCNC: 29.1 PG
MCHC RBC-ENTMCNC: 29.8 GM/DL
MCV RBC AUTO: 97.7 FL
MONOCYTES # BLD AUTO: 0.51 K/UL
MONOCYTES NFR BLD AUTO: 9.2 %
NEUTROPHILS # BLD AUTO: 3.6 K/UL
NEUTROPHILS NFR BLD AUTO: 64.6 %
PLATELET # BLD AUTO: 140 K/UL
POTASSIUM SERPL-SCNC: 4.2 MMOL/L
PROT SERPL-MCNC: 6 G/DL
RBC # BLD: 3.88 M/UL
RBC # FLD: 14.4 %
SARS-COV-2 IGG SERPL IA-ACNC: 0.1 INDEX
SARS-COV-2 IGG SERPL QL IA: NEGATIVE
SODIUM SERPL-SCNC: 139 MMOL/L
T4 SERPL-MCNC: 9.5 UG/DL
TRIGL SERPL-MCNC: 164 MG/DL
TSH SERPL-ACNC: 3.1 UIU/ML
URATE SERPL-MCNC: 6.6 MG/DL
WBC # FLD AUTO: 5.56 K/UL

## 2020-07-23 ENCOUNTER — RX RENEWAL (OUTPATIENT)
Age: 65
End: 2020-07-23

## 2020-07-24 ENCOUNTER — RX RENEWAL (OUTPATIENT)
Age: 65
End: 2020-07-24

## 2020-08-01 ENCOUNTER — APPOINTMENT (OUTPATIENT)
Dept: ULTRASOUND IMAGING | Facility: CLINIC | Age: 65
End: 2020-08-01

## 2020-08-01 ENCOUNTER — RESULT REVIEW (OUTPATIENT)
Age: 65
End: 2020-08-01

## 2020-08-01 ENCOUNTER — OUTPATIENT (OUTPATIENT)
Dept: OUTPATIENT SERVICES | Facility: HOSPITAL | Age: 65
LOS: 1 days | End: 2020-08-01
Payer: MEDICARE

## 2020-08-01 DIAGNOSIS — Z98.89 OTHER SPECIFIED POSTPROCEDURAL STATES: Chronic | ICD-10-CM

## 2020-08-01 DIAGNOSIS — Z98.84 BARIATRIC SURGERY STATUS: Chronic | ICD-10-CM

## 2020-08-01 DIAGNOSIS — L02.92 FURUNCLE, UNSPECIFIED: Chronic | ICD-10-CM

## 2020-08-01 DIAGNOSIS — K46.9 UNSPECIFIED ABDOMINAL HERNIA WITHOUT OBSTRUCTION OR GANGRENE: Chronic | ICD-10-CM

## 2020-08-01 DIAGNOSIS — Z41.1 ENCOUNTER FOR COSMETIC SURGERY: Chronic | ICD-10-CM

## 2020-08-01 DIAGNOSIS — Z96.653 PRESENCE OF ARTIFICIAL KNEE JOINT, BILATERAL: Chronic | ICD-10-CM

## 2020-08-01 DIAGNOSIS — M75.120 COMPLETE ROTATOR CUFF TEAR OR RUPTURE OF UNSPECIFIED SHOULDER, NOT SPECIFIED AS TRAUMATIC: Chronic | ICD-10-CM

## 2020-08-01 DIAGNOSIS — R60.0 LOCALIZED EDEMA: ICD-10-CM

## 2020-08-01 PROCEDURE — 93970 EXTREMITY STUDY: CPT | Mod: 26

## 2020-08-01 PROCEDURE — 93970 EXTREMITY STUDY: CPT

## 2020-08-03 ENCOUNTER — APPOINTMENT (OUTPATIENT)
Dept: FAMILY MEDICINE | Facility: CLINIC | Age: 65
End: 2020-08-03
Payer: MEDICARE

## 2020-08-03 VITALS
WEIGHT: 212 LBS | BODY MASS INDEX: 36.19 KG/M2 | OXYGEN SATURATION: 97 % | HEIGHT: 64 IN | SYSTOLIC BLOOD PRESSURE: 130 MMHG | HEART RATE: 85 BPM | DIASTOLIC BLOOD PRESSURE: 70 MMHG | RESPIRATION RATE: 16 BRPM

## 2020-08-03 DIAGNOSIS — R23.8 OTHER SKIN CHANGES: ICD-10-CM

## 2020-08-03 DIAGNOSIS — Z11.59 ENCOUNTER FOR SCREENING FOR OTHER VIRAL DISEASES: ICD-10-CM

## 2020-08-03 LAB — GLUCOSE BLDC GLUCOMTR-MCNC: 90

## 2020-08-03 PROCEDURE — 99214 OFFICE O/P EST MOD 30 MIN: CPT | Mod: 25

## 2020-08-03 PROCEDURE — 82947 ASSAY GLUCOSE BLOOD QUANT: CPT | Mod: QW

## 2020-08-04 NOTE — ASSESSMENT
[FreeTextEntry1] : b/l lumbar radiculopathy - f/u with neurosurgery. pt planning for surgery\par DM- fasting glucose in controlled range today. advised further weight loss. cont meds\par easy bruising - check lfts, PT/PTT/INR\par hypothyroidism - refill levothyroxine\par COVID-19 IgG antibody testing ordered

## 2020-08-04 NOTE — HISTORY OF PRESENT ILLNESS
[de-identified] : Pt for f/u lumbar back pain w/ b/l sciatica, DM. Pt has had worsening lumbar pain w/ neurogenic claudications. She is seeing neurosurgery Dr. Anibal Joiner.  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 Pt to have elective L3-L4 antral lateral interbody fusion, followed by posterior removal of all previous hardware instrumentation at the L3-4 level and posterior decompression at the L3-L4 disc space surgery scheduled in next  month.\par Last a1c 7.4 1.5 months ago. Pt reports she has lost weight through carb controlled diet. She has lost 13lbs since last visit. Glucose fasting today was 90.\par Pt c/o easy bruising in b/l legs recently. Pt does state she uses motrin often for pain and rubs her legs often to try to decrease pain.

## 2020-08-04 NOTE — PHYSICAL EXAM
[Soft] : abdomen soft [Non Tender] : non-tender [Non-distended] : non-distended [Normal] : affect was normal and insight and judgment were intact [de-identified] : +lower back tenderness, spasms [de-identified] : areas of ecchymosis b/l LE

## 2020-08-06 ENCOUNTER — APPOINTMENT (OUTPATIENT)
Dept: NEUROLOGY | Facility: CLINIC | Age: 65
End: 2020-08-06
Payer: MEDICARE

## 2020-08-06 VITALS
BODY MASS INDEX: 35.85 KG/M2 | HEART RATE: 98 BPM | SYSTOLIC BLOOD PRESSURE: 111 MMHG | HEIGHT: 64 IN | DIASTOLIC BLOOD PRESSURE: 76 MMHG | TEMPERATURE: 98.2 F | WEIGHT: 210 LBS

## 2020-08-06 PROCEDURE — 99204 OFFICE O/P NEW MOD 45 MIN: CPT

## 2020-08-06 RX ORDER — TRAMADOL HYDROCHLORIDE AND ACETAMINOPHEN 37.5; 325 MG/1; MG/1
37.5-325 TABLET, FILM COATED ORAL
Qty: 30 | Refills: 0 | Status: DISCONTINUED | COMMUNITY
End: 2020-08-06

## 2020-08-06 NOTE — PROCEDURE
[FreeTextEntry1] : [place] Cognitive Assessment 8/6/20:\par Visuospatial/Executive 4/5\par Naming 3/3\par Attention 4/6\par Language 2/3\par Abstraction 1/2\par Delayed recall 1/5\par Orientation 6/6\par Total 21/30

## 2020-08-06 NOTE — DISCUSSION/SUMMARY
[FreeTextEntry1] : Ms. García is a 64 year old woman presenting for evaluation of frequent falls.\par She also reports having concerns about her memory.\par \par Frequent Falls\par -Despite reporting memory, gait and urinary problems, there was no evidence of ventriculomegaly on MRI brain in January; no suggestion of NPH on MRI.\par -She does have a history of diabetes, but does not have any evidence of a sensory neuropathy on exam\par -I am going to order a prolonged EEG. It is not totally clear that there is no loss of consciousness. EEG will help to look for any epileptiform cause of falls.\par -If EEG is unremarkable will consider MRA head and neck to r/o any vertebrobasilar insufficiency.\par -Suggest f/u with cardiology to consider Holter monitor.\par -Will obtain reports from MRI of spine from Dr. Joiner. If not recently done, will get MRI cervical spine. \par \par Memory Impairment\par -She scored 21/30 on the [place] Cognitive Assessment with delayed recall being the area of worst performance.\par -Vitamin B12 and TSH levels normal.\par -Advised to participate in mentally stimulating activities.\par -If no improvement will consider HST and starting Aricept.\par \par f/u after EEG, sooner if needed.

## 2020-08-06 NOTE — CONSULT LETTER
[Dear  ___] : Dear  [unfilled], [Consult Letter:] : I had the pleasure of evaluating your patient, [unfilled]. [Please see my note below.] : Please see my note below. [Consult Closing:] : Thank you very much for allowing me to participate in the care of this patient.  If you have any questions, please do not hesitate to contact me. [FreeTextEntry2] : John Reyes [FreeTextEntry3] : Sincerely,\par \par \par Emily Agosto MD\par Diplomate, American Academy of Psychiatry and Neurology\par Board Certified in the Subspecialty of Clinical Neurophysiology\par Board Certified in the Subspecialty of Sleep Medicine\par Board Certified in the Subspecialty of Epilepsy\par

## 2020-08-06 NOTE — PHYSICAL EXAM
[FreeTextEntry1] : Examination:\par Constitutional: normal, no apparent distress\par Eyes: normal conjunctiva b/l, no ptosis, visual fields full\par Respiratory: no respiratory distress, normal effort, normal auscultation\par Cardiovascular: normal rate, rhythm, no murmurs\par Neck: supple, no masses\par Vascular: carotids normal\par Skin: normal color, no rashes\par Psych: normal mood, affect\par \par Neurological:\par Memory: oriented to person, place, time. [place] Cognitive Assessment Score 21/30\par Language intact/no aphasia\par Cranial Nerves: II-XII intact, Pupils equally round and reactive to light, ocular muscles/movements intact, no ptosis, no facial weakness, tongue protrudes normally in the midline, \par Motor: normal tone, no pronator drift, full strength in all four extremities in the proximal and distal muscle groups\par Coordination: Fine motor movements intact, rapid alternating movements intact, finger to nose intact bilaterally\par Sensory: intact to light touch, vibration, joint position sense, negative Romberg examination\par DTRs: symmetric, 1+ in b/l triceps, 2 in b/l biceps, 2 in b/l brachioradialis, 2+ in right patellar, trace on left, 1+ right, trace on left,  Babinskis negative bilaterally, Negative Hoang's, no ankle clonus\par Gait: narrow based, steady, able to walk on heels, toes, some difficulty with tandem gait\par \par

## 2020-08-06 NOTE — DATA REVIEWED
[de-identified] : MRI brain without contrast 1/8/20: \par No evidence of acute infarction, hemorrhage or hydrocephalus.\par Minimal non-acute small vessel ischemic change. [de-identified] : Blood tests 6/22/20:\par TSH 3.1, vitamin D 27.4\par Blood tests 1/8/20: B12 667\par HbA1C 7.6

## 2020-08-06 NOTE — HISTORY OF PRESENT ILLNESS
[FreeTextEntry1] : Ms. García states that in the past year she has fallen about 20 times.\par This has resulted in multiple injuries including fractures of various bones.\par She says that four of the falls have been major falls.\par \par She is not aware of any loss of consciousness with the falls.\par She does have dizziness which she describes as lightheadedness.\par She denies any spinning sensation.\par However, she does not feel that the falls are always preceded by dizziness.\par \par She does not know why she falls.\par \par She reports seeing floaters.\par \par She has had b/l knee replacements.\par She also has chronic lumbar spine problems and has had surgery.\par \par She does think that her knees are weak.\par \par She does have palpitations which she thinks is secondary to pain.\par \par She says that she has Crohn's Disease and Colitis but is not on any medications.\par She has not seen a gastroenterologist in many years.\par \par She does report memory problems.\par She has difficulty with short term memory. She also has problems remembering her past.\par \par She has difficulty controlling her urine. She thinks it is related to her back.\par \par She thinks that her balance and memory are impaired since she started taking medications for diabetes.

## 2020-08-06 NOTE — REVIEW OF SYSTEMS
[Feeling Tired] : feeling tired [As Noted in HPI] : as noted in HPI [Anxiety] : anxiety [Depression] : depression [Arthralgias] : arthralgias [Diarrhea] : diarrhea [Heartburn] : heartburn [Joint Pain] : joint pain [Negative] : Eyes [de-identified] : difficulty concentrating, irritabilities, paranoia, stressed out [FreeTextEntry2] : night sweats [FreeTextEntry4] : sinus condition [FreeTextEntry5] : fainting [FreeTextEntry7] : difficulty swallowing, dark stools [FreeTextEntry8] : frequent urination [FreeTextEntry9] : cramps

## 2020-08-07 LAB
ALBUMIN SERPL ELPH-MCNC: 4.4 G/DL
ALP BLD-CCNC: 77 U/L
ALT SERPL-CCNC: 44 U/L
APTT BLD: 26.9 SEC
AST SERPL-CCNC: 30 U/L
BILIRUB DIRECT SERPL-MCNC: 0.1 MG/DL
BILIRUB INDIRECT SERPL-MCNC: 0.3 MG/DL
BILIRUB SERPL-MCNC: 0.5 MG/DL
INR PPP: 0.88 RATIO
PROT SERPL-MCNC: 6.6 G/DL
PT BLD: 10.5 SEC
SARS-COV-2 IGG SERPL IA-ACNC: 0.09 INDEX
SARS-COV-2 IGG SERPL QL IA: NEGATIVE

## 2020-08-19 ENCOUNTER — APPOINTMENT (OUTPATIENT)
Dept: NEUROLOGY | Facility: CLINIC | Age: 65
End: 2020-08-19
Payer: MEDICARE

## 2020-08-19 PROCEDURE — 95816 EEG AWAKE AND DROWSY: CPT

## 2020-08-20 ENCOUNTER — APPOINTMENT (OUTPATIENT)
Dept: NEUROLOGY | Facility: CLINIC | Age: 65
End: 2020-08-20
Payer: MEDICARE

## 2020-08-20 PROCEDURE — 95708 EEG WO VID EA 12-26HR UNMNTR: CPT

## 2020-08-20 PROCEDURE — 95719 EEG PHYS/QHP EA INCR W/O VID: CPT

## 2020-09-10 ENCOUNTER — APPOINTMENT (OUTPATIENT)
Dept: FAMILY MEDICINE | Facility: CLINIC | Age: 65
End: 2020-09-10
Payer: MEDICARE

## 2020-09-10 PROCEDURE — 90686 IIV4 VACC NO PRSV 0.5 ML IM: CPT

## 2020-09-10 PROCEDURE — G0008: CPT

## 2020-09-10 NOTE — HISTORY OF PRESENT ILLNESS
[de-identified] : Risks and benefits of flu vaccine discussed w/ pt. Consent given by pt, flu vaccine administered. Pt tolerated well

## 2020-09-14 ENCOUNTER — NON-APPOINTMENT (OUTPATIENT)
Age: 65
End: 2020-09-14

## 2020-09-14 ENCOUNTER — APPOINTMENT (OUTPATIENT)
Dept: CARDIOLOGY | Facility: CLINIC | Age: 65
End: 2020-09-14
Payer: MEDICARE

## 2020-09-14 VITALS
DIASTOLIC BLOOD PRESSURE: 80 MMHG | HEART RATE: 96 BPM | BODY MASS INDEX: 35.68 KG/M2 | WEIGHT: 209 LBS | OXYGEN SATURATION: 97 % | HEIGHT: 64 IN | SYSTOLIC BLOOD PRESSURE: 120 MMHG

## 2020-09-14 DIAGNOSIS — R06.02 SHORTNESS OF BREATH: ICD-10-CM

## 2020-09-14 PROCEDURE — 99214 OFFICE O/P EST MOD 30 MIN: CPT | Mod: 25

## 2020-09-14 PROCEDURE — 93000 ELECTROCARDIOGRAM COMPLETE: CPT | Mod: NC

## 2020-09-14 RX ORDER — HYDROCHLOROTHIAZIDE 25 MG/1
25 TABLET ORAL DAILY
Qty: 90 | Refills: 1 | Status: DISCONTINUED | COMMUNITY
Start: 2018-07-26 | End: 2020-09-14

## 2020-09-14 RX ORDER — CLOBETASOL PROPIONATE 0.5 MG/G
0.05 OINTMENT TOPICAL TWICE DAILY
Qty: 1 | Refills: 1 | Status: DISCONTINUED | COMMUNITY
Start: 2019-08-31 | End: 2020-09-14

## 2020-09-14 RX ORDER — DICLOFENAC SODIUM AND MISOPROSTOL 75; 200 MG/1; UG/1
75-0.2 TABLET, DELAYED RELEASE ORAL
Qty: 20 | Refills: 0 | Status: DISCONTINUED | COMMUNITY
Start: 2019-07-26 | End: 2020-09-14

## 2020-09-14 RX ORDER — DOXYCYCLINE 100 MG/1
100 TABLET, FILM COATED ORAL
Qty: 14 | Refills: 0 | Status: DISCONTINUED | COMMUNITY
Start: 2020-01-23 | End: 2020-09-14

## 2020-09-14 RX ORDER — BETAMETHASONE VALERATE 1 MG/G
0.1 CREAM TOPICAL
Qty: 1 | Refills: 1 | Status: DISCONTINUED | COMMUNITY
Start: 2019-09-04 | End: 2020-09-14

## 2020-09-14 RX ORDER — FLUCONAZOLE 150 MG/1
150 TABLET ORAL
Qty: 2 | Refills: 0 | Status: DISCONTINUED | COMMUNITY
Start: 2019-01-22 | End: 2020-09-14

## 2020-09-14 RX ORDER — SULFAMETHOXAZOLE AND TRIMETHOPRIM 800; 160 MG/1; MG/1
800-160 TABLET ORAL TWICE DAILY
Qty: 14 | Refills: 0 | Status: DISCONTINUED | COMMUNITY
Start: 2019-08-13 | End: 2020-09-14

## 2020-09-14 RX ORDER — OXYCODONE 5 MG/1
5 TABLET ORAL EVERY 8 HOURS
Qty: 30 | Refills: 0 | Status: DISCONTINUED | COMMUNITY
Start: 2019-01-23 | End: 2020-09-14

## 2020-09-14 RX ORDER — SULFAMETHOXAZOLE AND TRIMETHOPRIM 800; 160 MG/1; MG/1
800-160 TABLET ORAL TWICE DAILY
Qty: 6 | Refills: 0 | Status: DISCONTINUED | COMMUNITY
Start: 2019-10-29 | End: 2020-09-14

## 2020-09-14 NOTE — DISCUSSION/SUMMARY
[FreeTextEntry1] : Pt is a 64 y/o F who presents today for f/u.  Pt recently with multiple falls and was found to be possibly due to seizures.  Pt is scheduled for back surgery 9/25/2020 at [facility] with Dr Washington Diggs.  She states that she gets CP with emotional stress or with her leg pain.    She thinks her breathing has gotten worse as well but she thinks it may be due to the mask she is wearing from [place]\par TTE 04/2018 normal LV function, mild DD, mild PI\par check pharm nuclear stress test to eval for ischemia - pt cannot walk on treadmill due to back and leg pain\par Further preop recommendations will be made once diagnostic testing is complete. \par The described plan was discussed with the pt.  All questions and concerns were addressed to the best of my knowledge.\par

## 2020-09-14 NOTE — REVIEW OF SYSTEMS
[Shortness Of Breath] : shortness of breath [see HPI] : see HPI [Chest Pain] : chest pain [Negative] : Endocrine [Palpitations] : no palpitations

## 2020-09-14 NOTE — HISTORY OF PRESENT ILLNESS
[FreeTextEntry1] : Pt is a 66 y/o F who presents today for f/u.  Pt recently with multiple falls and was found to be possibly due to seizures.  Pt is scheduled for back surgery 9/25/2020 at [facility] with Dr Washington Diggs.  She states that she gets CP with emotional stress or with her leg pain.    She thinks her breathing has gotten worse as well but she thinks it may be due to the mask she is wearing from [place]\par \par TTE 04/2018 normal LV function, mild DD, mild PI\par \par PMH: DM, hypothyroidism, asthma, nephrolithiasis, gastric bypass\par Smoking status: quit 16 yrs ago\par Current exercise: none\par Daily water intake: 32 oz\par Daily caffeine intake: 1 cup coffee\par OTC medications: see list\par Family hx: mother and father stroke, mother PPM, father ?MI at age 70\par Previous cardiac testing: stress test many yrs ago "normal"\par Previous hospitalizations: knee and back surgery\par

## 2020-09-14 NOTE — PHYSICAL EXAM
[Normal Appearance] : normal appearance [General Appearance - Well Developed] : well developed [No Deformities] : no deformities [Well Groomed] : well groomed [General Appearance - Well Nourished] : well nourished [General Appearance - In No Acute Distress] : no acute distress [Normal Conjunctiva] : the conjunctiva exhibited no abnormalities [Eyelids - No Xanthelasma] : the eyelids demonstrated no xanthelasmas [Normal Oral Mucosa] : normal oral mucosa [No Oral Cyanosis] : no oral cyanosis [No Oral Pallor] : no oral pallor [Respiration, Rhythm And Depth] : normal respiratory rhythm and effort [Exaggerated Use Of Accessory Muscles For Inspiration] : no accessory muscle use [Heart Rate And Rhythm] : heart rate and rhythm were normal [Auscultation Breath Sounds / Voice Sounds] : lungs were clear to auscultation bilaterally [Heart Sounds] : normal S1 and S2 [Arterial Pulses Normal] : the arterial pulses were normal [Systolic grade ___/6] : A grade [unfilled]/6 systolic murmur was heard. [Edema] : no peripheral edema present [Abdomen Soft] : soft [Abdomen Tenderness] : non-tender [Gait - Sufficient For Exercise Testing] : the gait was sufficient for exercise testing [Abnormal Walk] : normal gait [Abdomen Mass (___ Cm)] : no abdominal mass palpated [Nail Clubbing] : no clubbing of the fingernails [Cyanosis, Localized] : no localized cyanosis [Petechial Hemorrhages (___cm)] : no petechial hemorrhages [Oriented To Time, Place, And Person] : oriented to person, place, and time [] : no ischemic changes [No Anxiety] : not feeling anxious [Mood] : the mood was normal [Affect] : the affect was normal [Impaired Insight] : insight and judgment were intact [FreeTextEntry1] : no JVD or bruits

## 2020-09-17 ENCOUNTER — APPOINTMENT (OUTPATIENT)
Dept: MRI IMAGING | Facility: CLINIC | Age: 65
End: 2020-09-17
Payer: MEDICARE

## 2020-09-17 ENCOUNTER — OUTPATIENT (OUTPATIENT)
Dept: OUTPATIENT SERVICES | Facility: HOSPITAL | Age: 65
LOS: 1 days | End: 2020-09-17
Payer: MEDICARE

## 2020-09-17 DIAGNOSIS — Z96.653 PRESENCE OF ARTIFICIAL KNEE JOINT, BILATERAL: Chronic | ICD-10-CM

## 2020-09-17 DIAGNOSIS — K46.9 UNSPECIFIED ABDOMINAL HERNIA WITHOUT OBSTRUCTION OR GANGRENE: Chronic | ICD-10-CM

## 2020-09-17 DIAGNOSIS — Z98.89 OTHER SPECIFIED POSTPROCEDURAL STATES: Chronic | ICD-10-CM

## 2020-09-17 DIAGNOSIS — L02.92 FURUNCLE, UNSPECIFIED: Chronic | ICD-10-CM

## 2020-09-17 DIAGNOSIS — R94.01 ABNORMAL ELECTROENCEPHALOGRAM [EEG]: ICD-10-CM

## 2020-09-17 DIAGNOSIS — M75.120 COMPLETE ROTATOR CUFF TEAR OR RUPTURE OF UNSPECIFIED SHOULDER, NOT SPECIFIED AS TRAUMATIC: Chronic | ICD-10-CM

## 2020-09-17 DIAGNOSIS — Z98.84 BARIATRIC SURGERY STATUS: Chronic | ICD-10-CM

## 2020-09-17 DIAGNOSIS — Z41.1 ENCOUNTER FOR COSMETIC SURGERY: Chronic | ICD-10-CM

## 2020-09-17 DIAGNOSIS — Z00.8 ENCOUNTER FOR OTHER GENERAL EXAMINATION: ICD-10-CM

## 2020-09-17 PROCEDURE — A9585: CPT

## 2020-09-17 PROCEDURE — 70553 MRI BRAIN STEM W/O & W/DYE: CPT

## 2020-09-17 PROCEDURE — 70553 MRI BRAIN STEM W/O & W/DYE: CPT | Mod: 26

## 2020-09-21 ENCOUNTER — APPOINTMENT (OUTPATIENT)
Dept: FAMILY MEDICINE | Facility: CLINIC | Age: 65
End: 2020-09-21
Payer: MEDICARE

## 2020-09-21 ENCOUNTER — APPOINTMENT (OUTPATIENT)
Dept: NEUROLOGY | Facility: CLINIC | Age: 65
End: 2020-09-21
Payer: MEDICARE

## 2020-09-21 ENCOUNTER — APPOINTMENT (OUTPATIENT)
Dept: CARDIOLOGY | Facility: CLINIC | Age: 65
End: 2020-09-21
Payer: MEDICARE

## 2020-09-21 VITALS
OXYGEN SATURATION: 99 % | SYSTOLIC BLOOD PRESSURE: 134 MMHG | HEIGHT: 64 IN | DIASTOLIC BLOOD PRESSURE: 82 MMHG | WEIGHT: 210 LBS | HEART RATE: 84 BPM | BODY MASS INDEX: 35.85 KG/M2

## 2020-09-21 VITALS
DIASTOLIC BLOOD PRESSURE: 85 MMHG | HEART RATE: 85 BPM | SYSTOLIC BLOOD PRESSURE: 134 MMHG | HEIGHT: 64 IN | WEIGHT: 210 LBS | TEMPERATURE: 98 F | BODY MASS INDEX: 35.85 KG/M2

## 2020-09-21 DIAGNOSIS — Z87.440 PERSONAL HISTORY OF URINARY (TRACT) INFECTIONS: ICD-10-CM

## 2020-09-21 DIAGNOSIS — M54.16 RADICULOPATHY, LUMBAR REGION: ICD-10-CM

## 2020-09-21 PROCEDURE — 99213 OFFICE O/P EST LOW 20 MIN: CPT

## 2020-09-21 PROCEDURE — 99214 OFFICE O/P EST MOD 30 MIN: CPT

## 2020-09-21 PROCEDURE — 78452 HT MUSCLE IMAGE SPECT MULT: CPT

## 2020-09-21 PROCEDURE — 93018 CV STRESS TEST I&R ONLY: CPT

## 2020-09-21 PROCEDURE — 93017 CV STRESS TEST TRACING ONLY: CPT

## 2020-09-21 PROCEDURE — A9500: CPT

## 2020-09-21 NOTE — PHYSICAL EXAM
[FreeTextEntry1] : Examination:\par Constitutional: normal, no apparent distress\par Eyes: normal conjunctiva b/l, no ptosis, visual fields full\par Respiratory: no respiratory distress, normal effort, normal auscultation\par Cardiovascular: normal rate, rhythm, no murmurs\par Neck: supple, no masses\par Vascular: carotids normal\par Skin: normal color, no rashes\par Psych: normal mood, affect\par \par Neurological:\par Memory: normal memory, oriented to person, place, time\par Language intact/no aphasia\par Cranial Nerves: II-XII intact, Pupils equally round and reactive to light, ocular muscles/movements intact, no ptosis, no facial weakness, tongue protrudes normally in the midline, \par Motor: normal tone, no pronator drift, full strength in all four extremities in the proximal and distal muscle groups\par Coordination: Fine motor movements intact, rapid alternating movements intact, finger to nose intact bilaterally\par Sensory: intact to light touch\par DTRs: symmetric, 1+ in b/l triceps, 2 in b/l biceps, 2 in b/l brachioradialis, 2+ in right patellar, trace on left, 1+ right, trace on left,\par Gait: narrow based, steady\par

## 2020-09-21 NOTE — DATA REVIEWED
[de-identified] : MRI brain without contrast 1/8/20: \par No evidence of acute infarction, hemorrhage or hydrocephalus.\par Minimal non-acute small vessel ischemic change.\par \par MRI brain with and without contrast and seizure protocol 9/14/20:\par \par No hydrocephalus, mass effect, acute intracranial hemorrhage, vasogenic edema, or acute territorial infarct.\par \par Tiny nonspecific focus of increased T2 and FLAIR hyperintense signal involving the superior right insular cortex (6-20). This could represent a small chronic infarct. Cortical dysplasia is considered much less likely.\par \par No abnormal parenchymal or leptomeningeal enhancement.\par \par No evidence for mesial temporal sclerosis or gray matter heterotopia.\par  [de-identified] : Routine EEG 8/19/20: \par This was an abnormal EEG study in the awake and drowsy states due to the presence of:\par -Bilateral independent temporal sharp waves, left more frequent than right, with phase reversal at F7 and F8.\par \par EEG Impression/Clinical Correlate:\par The findings are consistent with a risk for focal onset seizures.\par  Consider a repeat study if clinically indicated. \par \par 24 hour EEG 8/19/20-8/20/20:\par \par EEG Summary/Classification:\par This was an abnormal EEG study in the awake, drowsy, and asleep states due to the presence of bilateral independent temporal sharp waves with phase reversal at F7 and F8, seen more frequently on the left.\par \par EEG Impression/Clinical Correlate:\par The findings are suggestive of an risk for focal onset seizures. \par  [de-identified] : Blood tests 6/22/20:\par TSH 3.1, vitamin D 27.4\par Blood tests 1/8/20: B12 667\par HbA1C 7.6

## 2020-09-21 NOTE — DISCUSSION/SUMMARY
[FreeTextEntry1] : Ms. García is a 65 year old woman who presented for evaluation of frequent falls.\par She also reports having concerns about her memory.\par \par Frequent Falls\par -Despite reporting memory, gait and urinary problems, there was no evidence of ventriculomegaly on MRI brain in January; no suggestion of NPH on MRI.\par -EEG showed some b/l temporal sharp waves. Temporal lobe seizures would not necessarily cause falls.\par -She is having spine surgery.\par -If falls continue after surgery will consider MRA head and neck to r/o any vertebrobasilar insufficiency.\par \par \par Memory Impairment\par -She scored 21/30 on the [place] Cognitive Assessment with delayed recall being the area of worst performance.\par -Vitamin B12 and TSH levels normal.\par -EEG abnormal.\par -Advised to participate in mentally stimulating activities.\par \par \par Possible seizures.\par -Possible focal seizures contributing to memory loss. EEG was abnormal\par -Did not tolerate Keppra\par -Now on Trileptal 30 mg BID \par -F/U sodium levels and Trileptal level. May need to increase dose in future.\par \par \par From a neurological perspective, I feel comfortable with her undergoing surgery.\par She should continue on Oxcarbazepine even when NPO (do not skip doses prior to surgery).\par Would suggest giving lorazepam 1 mg IV at the end of surgery to decrease any risk of seizure when anesthesia is wearing off.\par \par f/u when recovered from surgery.

## 2020-09-23 LAB — BACTERIA UR CULT: NORMAL

## 2020-09-23 NOTE — HISTORY OF PRESENT ILLNESS
[Asthma] : asthma [No Adverse Anesthesia Reaction] : no adverse anesthesia reaction in self or family member [Diabetes] : diabetes [No Pertinent Cardiac History] : no history of aortic stenosis, atrial fibrillation, coronary artery disease, recent myocardial infarction, or implantable device/pacemaker [(Patient denies any chest pain, claudication, dyspnea on exertion, orthopnea, palpitations or syncope)] : Patient denies any chest pain, claudication, dyspnea on exertion, orthopnea, palpitations or syncope [Unable to assess] : unable to assess [Chronic Anticoagulation] : no chronic anticoagulation [Chronic Kidney Disease] : no chronic kidney disease [FreeTextEntry1] : L3/L4 extreme lateral interbody fusion w/ L3/L4 posterior fusion laminectomy or removal of hardware w/ muscle flap closure of back [FreeTextEntry2] : 9/28/20 [FreeTextEntry3] : Dr Dickey [FreeTextEntry4] : Pt in office for medical clearance. Pt to go for procedure for worsening radiculopathy. Pt denies chest pain, palpitations, dyspnea, fever, chills, nausea, or vomiting. Pt has hx of focal seizures, pt recently started on oxcarbazepine which she has tolerated well. Pt has hx of asthma, uses singulair qhs and proair prn. Pt's hx of DM, pt on januvia, glimepiride, metformin. Pt seeing cardio Dr. Callahan for cardiac clearance. Pt has hx of Crohn's, pt to f/u with GI after procedure.

## 2020-09-23 NOTE — ASSESSMENT
[Patient Optimized for Surgery] : Patient optimized for surgery [FreeTextEntry4] : Pt is medically optimized for procedure at this time pending urine cx. Pt started on bactrim course for treatment of possible UTI\par Pt to continue oxcarbazepine as per neuro. Neurology also recommending giving lorazepam 1mg IV at the end of surgery to decrease risk of seizure when anesthesias is wearing off. Neuro also recommends f/u of sodium and trileptal level perioperatively in hospital.\par Pt to hold glimepiride the morning of surgery. Pt will continue metformin and Januvia day of procedure.\par \par Fax mcl to \par \par Addendum 9/23/20\par Urine culture normal urogenital brendon. Pt advised to d/c bactrim. Pt is medically optimized for procedure at this time.

## 2020-09-23 NOTE — PHYSICAL EXAM
[Normal Oropharynx] : the oropharynx was normal [Soft] : abdomen soft [Non Tender] : non-tender [Non-distended] : non-distended [No Rash] : no rash [Normal] : affect was normal and insight and judgment were intact [de-identified] : +lumbar back tenderness

## 2020-09-23 NOTE — RESULTS/DATA
[] : results reviewed [de-identified] : glucose 149 [de-identified] : EKG- NSR 92 bpm no st t changes [de-identified] : UA +few bacteria, trace leuk

## 2020-10-22 ENCOUNTER — APPOINTMENT (OUTPATIENT)
Dept: FAMILY MEDICINE | Facility: CLINIC | Age: 65
End: 2020-10-22
Payer: MEDICARE

## 2020-10-22 VITALS
SYSTOLIC BLOOD PRESSURE: 130 MMHG | TEMPERATURE: 97.6 F | HEART RATE: 86 BPM | WEIGHT: 205 LBS | BODY MASS INDEX: 35 KG/M2 | DIASTOLIC BLOOD PRESSURE: 74 MMHG | OXYGEN SATURATION: 98 % | HEIGHT: 64 IN

## 2020-10-22 DIAGNOSIS — Z12.39 ENCOUNTER FOR OTHER SCREENING FOR MALIGNANT NEOPLASM OF BREAST: ICD-10-CM

## 2020-10-22 PROCEDURE — 99214 OFFICE O/P EST MOD 30 MIN: CPT

## 2020-10-22 NOTE — HISTORY OF PRESENT ILLNESS
[de-identified] : Pt f/u surgery of L3/L4 extreme lateral interbody fusion w/ L3/L4 posterior fusion laminectomy or removal of hardware w/ muscle flap closure of back on 9/28/20. Pt was discharged to [facility] rehab and was discharged to home 10/19/20. Pt on oxycodone 10mg bid for pain control. Pt has f/u with neurosurgeon and surgical site is healing well. Pt now doing outpt PT. 10/6/20 a1c 5.6 at nursing home. Denies CP, palpitations, dyspnea, n/v\par \par Pt was told by counselor in hospital that she is suspected to be bipolar. Pt to f/u with gastro for colonoscopy for Crohn's disease. Last colonoscopy approx 4 years ago. Pt to f/u with neuro for seizure f/u.

## 2020-10-22 NOTE — ASSESSMENT
[FreeTextEntry1] : mood d/o - refer to behavioral health mgr for eval\par focal seizures - cont meds, f/u w/ neuro\par Crohn's - due for colonoscopy\par Due for screening mammo, ordered

## 2020-11-05 ENCOUNTER — OUTPATIENT (OUTPATIENT)
Dept: OUTPATIENT SERVICES | Facility: HOSPITAL | Age: 65
LOS: 1 days | End: 2020-11-05
Payer: MEDICARE

## 2020-11-05 ENCOUNTER — APPOINTMENT (OUTPATIENT)
Dept: FAMILY MEDICINE | Facility: CLINIC | Age: 65
End: 2020-11-05
Payer: MEDICARE

## 2020-11-05 ENCOUNTER — APPOINTMENT (OUTPATIENT)
Dept: RADIOLOGY | Facility: CLINIC | Age: 65
End: 2020-11-05
Payer: MEDICARE

## 2020-11-05 VITALS
WEIGHT: 212 LBS | BODY MASS INDEX: 36.19 KG/M2 | SYSTOLIC BLOOD PRESSURE: 126 MMHG | HEART RATE: 84 BPM | TEMPERATURE: 97.7 F | DIASTOLIC BLOOD PRESSURE: 80 MMHG | OXYGEN SATURATION: 96 % | HEIGHT: 64 IN

## 2020-11-05 DIAGNOSIS — K46.9 UNSPECIFIED ABDOMINAL HERNIA WITHOUT OBSTRUCTION OR GANGRENE: Chronic | ICD-10-CM

## 2020-11-05 DIAGNOSIS — Z98.84 BARIATRIC SURGERY STATUS: Chronic | ICD-10-CM

## 2020-11-05 DIAGNOSIS — Z98.89 OTHER SPECIFIED POSTPROCEDURAL STATES: Chronic | ICD-10-CM

## 2020-11-05 DIAGNOSIS — L02.92 FURUNCLE, UNSPECIFIED: Chronic | ICD-10-CM

## 2020-11-05 DIAGNOSIS — M43.16 SPONDYLOLISTHESIS, LUMBAR REGION: ICD-10-CM

## 2020-11-05 DIAGNOSIS — M75.120 COMPLETE ROTATOR CUFF TEAR OR RUPTURE OF UNSPECIFIED SHOULDER, NOT SPECIFIED AS TRAUMATIC: Chronic | ICD-10-CM

## 2020-11-05 DIAGNOSIS — Z96.653 PRESENCE OF ARTIFICIAL KNEE JOINT, BILATERAL: Chronic | ICD-10-CM

## 2020-11-05 DIAGNOSIS — Z41.1 ENCOUNTER FOR COSMETIC SURGERY: Chronic | ICD-10-CM

## 2020-11-05 DIAGNOSIS — R73.9 HYPERGLYCEMIA, UNSPECIFIED: ICD-10-CM

## 2020-11-05 LAB — GLUCOSE BLDC GLUCOMTR-MCNC: NORMAL

## 2020-11-05 PROCEDURE — 72100 X-RAY EXAM L-S SPINE 2/3 VWS: CPT | Mod: 26

## 2020-11-05 PROCEDURE — 72100 X-RAY EXAM L-S SPINE 2/3 VWS: CPT

## 2020-11-05 PROCEDURE — 99214 OFFICE O/P EST MOD 30 MIN: CPT | Mod: 25

## 2020-11-05 PROCEDURE — 82962 GLUCOSE BLOOD TEST: CPT

## 2020-11-05 NOTE — HISTORY OF PRESENT ILLNESS
[FreeTextEntry8] : Pt c/o getting 290-400 on finger stick glucose at home in past 2-3 weeks. Pt has been feeling jittery, irritable. Repeat on pt's glucose monitor was 355 today. Glucose check on in house machine 144. Pt had been feeling this way since stopping insulin when she was in rehab, pt now back on metformin, januvia, and glimepiride. Denies CP, palpitations, dyspnea, n/v\par Pt also f/u hypothyroidism, due to recheck TFTs

## 2020-11-05 NOTE — ASSESSMENT
[FreeTextEntry1] : DM, hyperglycemic readings - I suspect faulty pt glucometer. Check a1c, cmp. Will likely need to rx a new glucometer. cont meds\par hypothyroidism - cont levothyroxine, check TFTs\par anemia - recheck cbc

## 2020-11-06 RX ORDER — LEVETIRACETAM 500 MG/1
500 TABLET, FILM COATED ORAL
Qty: 60 | Refills: 2 | Status: DISCONTINUED | COMMUNITY
Start: 2020-08-20 | End: 2020-11-06

## 2020-11-09 ENCOUNTER — RX RENEWAL (OUTPATIENT)
Age: 65
End: 2020-11-09

## 2020-11-09 LAB
ALBUMIN SERPL ELPH-MCNC: 4.1 G/DL
ALP BLD-CCNC: 109 U/L
ALT SERPL-CCNC: 22 U/L
ANION GAP SERPL CALC-SCNC: 13 MMOL/L
AST SERPL-CCNC: 18 U/L
BASOPHILS # BLD AUTO: 0.05 K/UL
BASOPHILS NFR BLD AUTO: 0.6 %
BILIRUB SERPL-MCNC: 0.3 MG/DL
BUN SERPL-MCNC: 14 MG/DL
CALCIUM SERPL-MCNC: 9.6 MG/DL
CHLORIDE SERPL-SCNC: 102 MMOL/L
CO2 SERPL-SCNC: 28 MMOL/L
CREAT SERPL-MCNC: 0.64 MG/DL
EOSINOPHIL # BLD AUTO: 0.1 K/UL
EOSINOPHIL NFR BLD AUTO: 1.1 %
ESTIMATED AVERAGE GLUCOSE: 123 MG/DL
GLUCOSE SERPL-MCNC: 142 MG/DL
HBA1C MFR BLD HPLC: 5.9 %
HCT VFR BLD CALC: 33.5 %
HGB BLD-MCNC: 10 G/DL
IMM GRANULOCYTES NFR BLD AUTO: 0.7 %
LYMPHOCYTES # BLD AUTO: 1.63 K/UL
LYMPHOCYTES NFR BLD AUTO: 18 %
MAN DIFF?: NORMAL
MCHC RBC-ENTMCNC: 29.2 PG
MCHC RBC-ENTMCNC: 29.9 GM/DL
MCV RBC AUTO: 98 FL
MONOCYTES # BLD AUTO: 0.67 K/UL
MONOCYTES NFR BLD AUTO: 7.4 %
NEUTROPHILS # BLD AUTO: 6.54 K/UL
NEUTROPHILS NFR BLD AUTO: 72.2 %
PLATELET # BLD AUTO: 202 K/UL
POTASSIUM SERPL-SCNC: 3.8 MMOL/L
PROT SERPL-MCNC: 6.4 G/DL
RBC # BLD: 3.42 M/UL
RBC # FLD: 13.9 %
SODIUM SERPL-SCNC: 143 MMOL/L
T4 FREE SERPL-MCNC: 1.4 NG/DL
TSH SERPL-ACNC: 2.64 UIU/ML
WBC # FLD AUTO: 9.05 K/UL

## 2020-11-09 RX ORDER — LANCETS
EACH MISCELLANEOUS
Qty: 100 | Refills: 3 | Status: ACTIVE | COMMUNITY
Start: 2020-11-09 | End: 1900-01-01

## 2020-11-09 RX ORDER — BLOOD-GLUCOSE METER
W/DEVICE EACH MISCELLANEOUS
Qty: 1 | Refills: 0 | Status: ACTIVE | COMMUNITY
Start: 2020-11-09 | End: 1900-01-01

## 2020-11-17 ENCOUNTER — APPOINTMENT (OUTPATIENT)
Dept: OBGYN | Facility: CLINIC | Age: 65
End: 2020-11-17
Payer: MEDICARE

## 2020-11-17 VITALS
WEIGHT: 212 LBS | SYSTOLIC BLOOD PRESSURE: 124 MMHG | DIASTOLIC BLOOD PRESSURE: 70 MMHG | HEIGHT: 64 IN | BODY MASS INDEX: 36.19 KG/M2 | RESPIRATION RATE: 16 BRPM

## 2020-11-17 DIAGNOSIS — Z01.419 ENCOUNTER FOR GYNECOLOGICAL EXAMINATION (GENERAL) (ROUTINE) W/OUT ABNORMAL FINDINGS: ICD-10-CM

## 2020-11-17 DIAGNOSIS — Z12.31 ENCOUNTER FOR SCREENING MAMMOGRAM FOR MALIGNANT NEOPLASM OF BREAST: ICD-10-CM

## 2020-11-17 PROCEDURE — 99203 OFFICE O/P NEW LOW 30 MIN: CPT | Mod: 25

## 2020-11-17 NOTE — HISTORY OF PRESENT ILLNESS
[Patient reported mammogram was normal] : Patient reported mammogram was normal [Patient reported PAP Smear was normal] : Patient reported PAP Smear was normal [No] : Patient does not have concerns regarding sex [Previously active] : previously active [TextBox_4] : Last exam 6 yrs ago, normal pap.\par LMP at 54, had vaginal bleeding 7 months ago, staining. lasts a couple of hours.  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

## 2020-11-17 NOTE — PHYSICAL EXAM
[Appropriately responsive] : appropriately responsive [Alert] : alert [No Acute Distress] : no acute distress [Soft] : soft [Non-tender] : non-tender [Non-distended] : non-distended [No HSM] : No HSM [No Lesions] : no lesions [No Mass] : no mass [Oriented x3] : oriented x3 [FreeTextEntry7] : midline vertical scar [Examination Of The Breasts] : a normal appearance [No Masses] : no breast masses were palpable [Labia Majora] : normal [Labia Minora] : normal [Normal] : normal [Tenderness] : nontender [Enlarged ___ wks] : not enlarged [Mass ___ cm] : no uterine mass was palpated [Uterine Adnexae] : non-palpable [No Tenderness] : no tenderness [FreeTextEntry5] : stenotic, pap obtained [FreeTextEntry9] : guaiac-

## 2020-11-19 ENCOUNTER — APPOINTMENT (OUTPATIENT)
Dept: NEUROLOGY | Facility: CLINIC | Age: 65
End: 2020-11-19
Payer: MEDICARE

## 2020-11-19 VITALS
SYSTOLIC BLOOD PRESSURE: 114 MMHG | WEIGHT: 212 LBS | HEIGHT: 64 IN | HEART RATE: 103 BPM | BODY MASS INDEX: 36.19 KG/M2 | DIASTOLIC BLOOD PRESSURE: 76 MMHG | TEMPERATURE: 97.5 F

## 2020-11-19 LAB — HPV HIGH+LOW RISK DNA PNL CVX: NOT DETECTED

## 2020-11-19 PROCEDURE — 99214 OFFICE O/P EST MOD 30 MIN: CPT

## 2020-11-19 NOTE — DATA REVIEWED
[de-identified] : MRI brain without contrast 1/8/20: \par No evidence of acute infarction, hemorrhage or hydrocephalus.\par Minimal non-acute small vessel ischemic change.\par \par MRI brain with and without contrast and seizure protocol 9/14/20:\par \par No hydrocephalus, mass effect, acute intracranial hemorrhage, vasogenic edema, or acute territorial infarct.\par \par Tiny nonspecific focus of increased T2 and FLAIR hyperintense signal involving the superior right insular cortex (6-20). This could represent a small chronic infarct. Cortical dysplasia is considered much less likely.\par \par No abnormal parenchymal or leptomeningeal enhancement.\par \par No evidence for mesial temporal sclerosis or gray matter heterotopia.\par  [de-identified] : Routine EEG 8/19/20: \par This was an abnormal EEG study in the awake and drowsy states due to the presence of:\par -Bilateral independent temporal sharp waves, left more frequent than right, with phase reversal at F7 and F8.\par \par EEG Impression/Clinical Correlate:\par The findings are consistent with a risk for focal onset seizures.\par  Consider a repeat study if clinically indicated. \par \par 24 hour EEG 8/19/20-8/20/20:\par \par EEG Summary/Classification:\par This was an abnormal EEG study in the awake, drowsy, and asleep states due to the presence of bilateral independent temporal sharp waves with phase reversal at F7 and F8, seen more frequently on the left.\par \par EEG Impression/Clinical Correlate:\par The findings are suggestive of an risk for focal onset seizures. \par  [de-identified] : Blood tests 6/22/20:\par TSH 3.1, vitamin D 27.4\par Blood tests 1/8/20: B12 667\par HbA1C 7.6

## 2020-11-19 NOTE — DISCUSSION/SUMMARY
[FreeTextEntry1] : Ms. García is a 65 year old woman who presented for evaluation of frequent falls.\par She also reports having concerns about her memory.\par \par Frequent Falls\par -Despite reporting memory, gait and urinary problems, there was no evidence of ventriculomegaly on MRI brain in January; no suggestion of NPH on MRI.\par -EEG showed some b/l temporal sharp waves. Temporal lobe seizures would not necessarily cause falls.\par -Gait  is improved since spine surgery\par \par \par Memory Impairment\par -She scored 21/30 on the [place] Cognitive Assessment with delayed recall being the area of worst performance.\par -Vitamin B12 and TSH levels normal.\par -EEG abnormal.\par -Advised to participate in mentally stimulating activities. Encouraged reading, puzzles, etc.\par -She sometimes feels fuzzy - work on metabolic issues - blood sugar, hydration, etc.\par -There may also be an element of pseudodementia from depression. She is trying to get set up with a counselor. \par \par \par Possible seizures.\par -Possible focal seizures contributing to memory loss. EEG was abnormal\par -Did not tolerate Keppra\par -Now on Trileptal 30 mg BID. Will check level\par -Na levels are normal - 143\par \par f/u 3 months, sooner if needed\par

## 2020-11-19 NOTE — PHYSICAL EXAM
[FreeTextEntry1] : Examination:\par Constitutional: normal, no apparent distress\par Eyes: normal conjunctiva b/l, no ptosis, visual fields full\par Respiratory: no respiratory distress, normal effort, normal auscultation\par Cardiovascular: normal rate, rhythm, no murmurs\par Neck: supple, no masses\par Vascular: carotids normal\par Skin: normal color, no rashes\par Psych: normal mood, affect\par \par Neurological:\par Memory: normal memory, oriented to person, place, time\par Language intact/no aphasia\par Cranial Nerves: II-XII intact, Pupils equally round and reactive to light, ocular muscles/movements intact, no ptosis, no facial weakness, tongue protrudes normally in the midline, \par Motor: normal tone, no pronator drift, full strength in all four extremities in the proximal and distal muscle groups\par Coordination: Fine motor movements intact, rapid alternating movements intact, finger to nose intact bilaterally\par Sensory: intact to light touch\par DTRs: symmetric, normal\par Gait: slightly antalgic\par

## 2020-11-19 NOTE — HISTORY OF PRESENT ILLNESS
[FreeTextEntry1] : I last saw Ms. García on 9/21/20.\par Her back surgery went well.\par She was in the hospital for 1 week and at [facility] for 2 weeks.\par She is feeling a lot better.\par She states that her walking is much improved.\par She is going to PT.\par \par She has been having some mild dizziness. She thinks it may be related to sinus issues. She is noticing pressure over her forehead and nasal discharge in the morning. \par \par She has not had any falls.\par \par She is currently taking oxcarbazepine 300 mg BID.\par She denies having any seizures since the last visit. There have been no episodes of waking up with tongue bites, urinary incontinence, or unexplained muscle soreness. There have been no staring spells, lapses in time, myoclonus, episodes of intense rosana vu or rising epigastric sensations.\par She does think that she may have olfactory hallucinations. She says that she smells weird smells.\par \par She still reports memory loss.\par She says that her mood is okay but once in a while she has a mood swing.\par

## 2020-11-28 ENCOUNTER — NON-APPOINTMENT (OUTPATIENT)
Age: 65
End: 2020-11-28

## 2020-11-28 ENCOUNTER — APPOINTMENT (OUTPATIENT)
Dept: FAMILY MEDICINE | Facility: CLINIC | Age: 65
End: 2020-11-28
Payer: MEDICARE

## 2020-11-28 DIAGNOSIS — Z20.828 CONTACT WITH AND (SUSPECTED) EXPOSURE TO OTHER VIRAL COMMUNICABLE DISEASES: ICD-10-CM

## 2020-11-28 PROCEDURE — 99213 OFFICE O/P EST LOW 20 MIN: CPT | Mod: CS

## 2020-11-28 NOTE — HISTORY OF PRESENT ILLNESS
[de-identified] : Pt for f/u anemia found on recent labs. hb was 10.0 on 11/6 labs. Pt also requests a COVID swab as she was in hospital for surgery several weeks ago and has since been feeling fatigued.

## 2020-11-30 ENCOUNTER — OUTPATIENT (OUTPATIENT)
Dept: OUTPATIENT SERVICES | Facility: HOSPITAL | Age: 65
LOS: 1 days | End: 2020-11-30
Payer: MEDICARE

## 2020-11-30 ENCOUNTER — APPOINTMENT (OUTPATIENT)
Dept: CT IMAGING | Facility: CLINIC | Age: 65
End: 2020-11-30
Payer: MEDICARE

## 2020-11-30 ENCOUNTER — RESULT REVIEW (OUTPATIENT)
Age: 65
End: 2020-11-30

## 2020-11-30 ENCOUNTER — RX RENEWAL (OUTPATIENT)
Age: 65
End: 2020-11-30

## 2020-11-30 ENCOUNTER — APPOINTMENT (OUTPATIENT)
Dept: MAMMOGRAPHY | Facility: CLINIC | Age: 65
End: 2020-11-30
Payer: MEDICARE

## 2020-11-30 ENCOUNTER — APPOINTMENT (OUTPATIENT)
Dept: ULTRASOUND IMAGING | Facility: CLINIC | Age: 65
End: 2020-11-30
Payer: MEDICARE

## 2020-11-30 DIAGNOSIS — Z98.84 BARIATRIC SURGERY STATUS: Chronic | ICD-10-CM

## 2020-11-30 DIAGNOSIS — Z98.89 OTHER SPECIFIED POSTPROCEDURAL STATES: Chronic | ICD-10-CM

## 2020-11-30 DIAGNOSIS — N95.0 POSTMENOPAUSAL BLEEDING: ICD-10-CM

## 2020-11-30 DIAGNOSIS — L02.92 FURUNCLE, UNSPECIFIED: Chronic | ICD-10-CM

## 2020-11-30 DIAGNOSIS — Z96.653 PRESENCE OF ARTIFICIAL KNEE JOINT, BILATERAL: Chronic | ICD-10-CM

## 2020-11-30 DIAGNOSIS — K46.9 UNSPECIFIED ABDOMINAL HERNIA WITHOUT OBSTRUCTION OR GANGRENE: Chronic | ICD-10-CM

## 2020-11-30 DIAGNOSIS — Z41.1 ENCOUNTER FOR COSMETIC SURGERY: Chronic | ICD-10-CM

## 2020-11-30 DIAGNOSIS — Z12.31 ENCOUNTER FOR SCREENING MAMMOGRAM FOR MALIGNANT NEOPLASM OF BREAST: ICD-10-CM

## 2020-11-30 DIAGNOSIS — M75.120 COMPLETE ROTATOR CUFF TEAR OR RUPTURE OF UNSPECIFIED SHOULDER, NOT SPECIFIED AS TRAUMATIC: Chronic | ICD-10-CM

## 2020-11-30 LAB
BASOPHILS # BLD AUTO: 0.04 K/UL
BASOPHILS NFR BLD AUTO: 0.6 %
EOSINOPHIL # BLD AUTO: 0.1 K/UL
EOSINOPHIL NFR BLD AUTO: 1.5 %
FERRITIN SERPL-MCNC: 25 NG/ML
FOLATE SERPL-MCNC: 10.3 NG/ML
HCT VFR BLD CALC: 33.8 %
HGB BLD-MCNC: 9.9 G/DL
IMM GRANULOCYTES NFR BLD AUTO: 0.7 %
IRON SATN MFR SERPL: 12 %
IRON SERPL-MCNC: 52 UG/DL
LYMPHOCYTES # BLD AUTO: 1.38 K/UL
LYMPHOCYTES NFR BLD AUTO: 20.5 %
MAN DIFF?: NORMAL
MCHC RBC-ENTMCNC: 28.4 PG
MCHC RBC-ENTMCNC: 29.3 GM/DL
MCV RBC AUTO: 97.1 FL
MONOCYTES # BLD AUTO: 0.64 K/UL
MONOCYTES NFR BLD AUTO: 9.5 %
NEUTROPHILS # BLD AUTO: 4.53 K/UL
NEUTROPHILS NFR BLD AUTO: 67.2 %
PLATELET # BLD AUTO: 205 K/UL
RBC # BLD: 3.48 M/UL
RBC # FLD: 14.6 %
SARS-COV-2 N GENE NPH QL NAA+PROBE: NOT DETECTED
TIBC SERPL-MCNC: 422 UG/DL
UIBC SERPL-MCNC: 370 UG/DL
VIT B12 SERPL-MCNC: >2000 PG/ML
WBC # FLD AUTO: 6.74 K/UL

## 2020-11-30 PROCEDURE — 77067 SCR MAMMO BI INCL CAD: CPT | Mod: 26

## 2020-11-30 PROCEDURE — 77063 BREAST TOMOSYNTHESIS BI: CPT | Mod: 26

## 2020-11-30 PROCEDURE — 76830 TRANSVAGINAL US NON-OB: CPT | Mod: 26

## 2020-11-30 PROCEDURE — 77067 SCR MAMMO BI INCL CAD: CPT

## 2020-11-30 PROCEDURE — 76830 TRANSVAGINAL US NON-OB: CPT

## 2020-11-30 PROCEDURE — 73200 CT UPPER EXTREMITY W/O DYE: CPT | Mod: 26,LT

## 2020-11-30 PROCEDURE — 73200 CT UPPER EXTREMITY W/O DYE: CPT

## 2020-11-30 PROCEDURE — 77063 BREAST TOMOSYNTHESIS BI: CPT

## 2020-12-02 ENCOUNTER — NON-APPOINTMENT (OUTPATIENT)
Age: 65
End: 2020-12-02

## 2020-12-02 LAB — OXCARBAZEPINE SERPL-MCNC: 8 UG/ML

## 2020-12-04 ENCOUNTER — NON-APPOINTMENT (OUTPATIENT)
Age: 65
End: 2020-12-04

## 2020-12-08 ENCOUNTER — APPOINTMENT (OUTPATIENT)
Dept: FAMILY MEDICINE | Facility: CLINIC | Age: 65
End: 2020-12-08
Payer: MEDICARE

## 2020-12-08 LAB — SARS-COV-2 N GENE NPH QL NAA+PROBE: NOT DETECTED

## 2020-12-08 PROCEDURE — 99213 OFFICE O/P EST LOW 20 MIN: CPT | Mod: 95

## 2020-12-08 NOTE — ASSESSMENT
[FreeTextEntry1] : allergic conjunctivitis - trial of ketotifen bid\par \par I have spent 15 minutes of time during this encounter including face to face time with patient and review of chart. >50% of time was spent counseling and/or coordinating care for above problems.

## 2020-12-08 NOTE — HISTORY OF PRESENT ILLNESS
[Home] : at home, [unfilled] , at the time of the visit. [Medical Office: ([facility])___] : at the medical office located in  [Verbal consent obtained from patient] : the patient, [unfilled] [FreeTextEntry8] : Pt c/o irritated itchy eyes b/l x 1.5 weeks. Denies discolored discharge or eye pain, vision changes. Pt taking oral antihistamine with minimal improvement.

## 2020-12-16 ENCOUNTER — APPOINTMENT (OUTPATIENT)
Dept: FAMILY MEDICINE | Facility: CLINIC | Age: 65
End: 2020-12-16
Payer: MEDICARE

## 2020-12-16 VITALS
DIASTOLIC BLOOD PRESSURE: 88 MMHG | BODY MASS INDEX: 36.19 KG/M2 | TEMPERATURE: 97.2 F | WEIGHT: 212 LBS | HEART RATE: 110 BPM | SYSTOLIC BLOOD PRESSURE: 140 MMHG | OXYGEN SATURATION: 96 % | RESPIRATION RATE: 16 BRPM | HEIGHT: 64 IN

## 2020-12-16 DIAGNOSIS — H10.13 ACUTE ATOPIC CONJUNCTIVITIS, BILATERAL: ICD-10-CM

## 2020-12-16 DIAGNOSIS — J01.00 ACUTE MAXILLARY SINUSITIS, UNSPECIFIED: ICD-10-CM

## 2020-12-16 DIAGNOSIS — Z87.09 PERSONAL HISTORY OF OTHER DISEASES OF THE RESPIRATORY SYSTEM: ICD-10-CM

## 2020-12-16 LAB
BILIRUB UR QL STRIP: NEGATIVE
GLUCOSE UR-MCNC: NEGATIVE
HCG UR QL: 0.2 EU/DL
HGB UR QL STRIP.AUTO: NORMAL
KETONES UR-MCNC: NEGATIVE
LEUKOCYTE ESTERASE UR QL STRIP: NEGATIVE
NITRITE UR QL STRIP: NEGATIVE
PH UR STRIP: 5.5
PROT UR STRIP-MCNC: NORMAL
SP GR UR STRIP: >=1.03

## 2020-12-16 PROCEDURE — 99213 OFFICE O/P EST LOW 20 MIN: CPT | Mod: 25

## 2020-12-16 PROCEDURE — 81003 URINALYSIS AUTO W/O SCOPE: CPT | Mod: QW

## 2020-12-16 RX ORDER — AZITHROMYCIN 250 MG/1
250 TABLET, FILM COATED ORAL
Qty: 1 | Refills: 0 | Status: DISCONTINUED | COMMUNITY
Start: 2019-05-07 | End: 2020-12-16

## 2020-12-16 RX ORDER — AZITHROMYCIN 250 MG/1
250 TABLET, FILM COATED ORAL
Qty: 1 | Refills: 0 | Status: DISCONTINUED | COMMUNITY
Start: 2019-08-22 | End: 2020-12-16

## 2020-12-16 NOTE — PHYSICAL EXAM
[Normal] : normal rate, regular rhythm, normal S1 and S2 and no murmur heard [Suprapubic] : in the suprapubic area [None] : no CVA tenderness

## 2020-12-16 NOTE — HISTORY OF PRESENT ILLNESS
[FreeTextEntry8] : UTI for two days, urgency, frequency and burning.No fever. Very tired.\par  Has some blood on TP after void.  Some vaginal bleeding and having biopsy next week.\par Crohns colitis flaring. Not on any treatment\par DM and does not check her glucose\par Back surgery 3 months ago.

## 2020-12-18 LAB — BACTERIA UR CULT: NORMAL

## 2020-12-21 PROBLEM — Z86.69 HISTORY OF OTITIS MEDIA: Status: RESOLVED | Noted: 2019-05-13 | Resolved: 2020-12-21

## 2020-12-21 PROBLEM — Z86.19 HISTORY OF CANDIDIASIS OF VAGINA: Status: RESOLVED | Noted: 2019-10-31 | Resolved: 2020-12-21

## 2020-12-22 ENCOUNTER — APPOINTMENT (OUTPATIENT)
Dept: OBGYN | Facility: CLINIC | Age: 65
End: 2020-12-22
Payer: MEDICARE

## 2020-12-22 VITALS
BODY MASS INDEX: 36.02 KG/M2 | DIASTOLIC BLOOD PRESSURE: 90 MMHG | HEIGHT: 64 IN | RESPIRATION RATE: 16 BRPM | SYSTOLIC BLOOD PRESSURE: 140 MMHG | WEIGHT: 211 LBS

## 2020-12-22 DIAGNOSIS — N95.0 POSTMENOPAUSAL BLEEDING: ICD-10-CM

## 2020-12-22 LAB
BILIRUB UR QL STRIP: NORMAL
CLARITY UR: NORMAL
COLLECTION METHOD: NORMAL
GLUCOSE UR-MCNC: NORMAL
HCG UR QL: 0.2 EU/DL
HGB UR QL STRIP.AUTO: NORMAL
KETONES UR-MCNC: NORMAL
LEUKOCYTE ESTERASE UR QL STRIP: NORMAL
NITRITE UR QL STRIP: NORMAL
PH UR STRIP: 6.5
PROT UR STRIP-MCNC: NORMAL
SP GR UR STRIP: 1.02

## 2020-12-22 PROCEDURE — 58100 BIOPSY OF UTERUS LINING: CPT | Mod: 53

## 2020-12-22 NOTE — PROCEDURE
[Endometrial Biopsy] : Endometrial biopsy [Time out performed] : Pre-procedure time out performed.  Patient's name, date of birth and procedure confirmed. [Consent Obtained] : Consent obtained [Post-Menop. Bleeding] : post-menopausal bleeding [Risks] : risks [Benefits] : benefits [Alternatives] : alternatives [Patient] : patient [Infection] : infection [Bleeding] : bleeding [Allergic Reaction] : allergic reaction [Uterine Perforation] : uterine perforation [Pain] : pain [N/A] : pregnancy test not applicable [No Premedication] : No premedication [Cytotec] : Cytotec [Betadine] : Betadine [None] : none [Tenaculum] : Tenaculum [Required Dilation] : required dilation [de-identified] : Extremely stenotic cervix despite taking misoprostol. Unable to penetrate external os. D and C in OR recommended . patient referred to gyn surgeon.

## 2020-12-23 PROBLEM — Z01.419 ENCOUNTER FOR GYNECOLOGICAL EXAMINATION: Status: RESOLVED | Noted: 2020-11-17 | Resolved: 2020-12-23

## 2020-12-23 PROBLEM — Z87.440 HISTORY OF URINARY TRACT INFECTION: Status: RESOLVED | Noted: 2020-09-21 | Resolved: 2020-12-23

## 2020-12-23 PROBLEM — Z12.31 ENCOUNTER FOR SCREENING MAMMOGRAM FOR MALIGNANT NEOPLASM OF BREAST: Status: RESOLVED | Noted: 2020-11-17 | Resolved: 2020-12-23

## 2021-01-04 ENCOUNTER — APPOINTMENT (OUTPATIENT)
Dept: RADIOLOGY | Facility: CLINIC | Age: 66
End: 2021-01-04
Payer: MEDICARE

## 2021-01-04 ENCOUNTER — OUTPATIENT (OUTPATIENT)
Dept: OUTPATIENT SERVICES | Facility: HOSPITAL | Age: 66
LOS: 1 days | End: 2021-01-04
Payer: MEDICARE

## 2021-01-04 DIAGNOSIS — Z98.89 OTHER SPECIFIED POSTPROCEDURAL STATES: Chronic | ICD-10-CM

## 2021-01-04 DIAGNOSIS — Z96.653 PRESENCE OF ARTIFICIAL KNEE JOINT, BILATERAL: Chronic | ICD-10-CM

## 2021-01-04 DIAGNOSIS — L02.92 FURUNCLE, UNSPECIFIED: Chronic | ICD-10-CM

## 2021-01-04 DIAGNOSIS — M75.120 COMPLETE ROTATOR CUFF TEAR OR RUPTURE OF UNSPECIFIED SHOULDER, NOT SPECIFIED AS TRAUMATIC: Chronic | ICD-10-CM

## 2021-01-04 DIAGNOSIS — K46.9 UNSPECIFIED ABDOMINAL HERNIA WITHOUT OBSTRUCTION OR GANGRENE: Chronic | ICD-10-CM

## 2021-01-04 DIAGNOSIS — Z98.84 BARIATRIC SURGERY STATUS: Chronic | ICD-10-CM

## 2021-01-04 DIAGNOSIS — Z41.1 ENCOUNTER FOR COSMETIC SURGERY: Chronic | ICD-10-CM

## 2021-01-04 DIAGNOSIS — M96.1 POSTLAMINECTOMY SYNDROME, NOT ELSEWHERE CLASSIFIED: ICD-10-CM

## 2021-01-04 PROCEDURE — 72100 X-RAY EXAM L-S SPINE 2/3 VWS: CPT

## 2021-01-04 PROCEDURE — 72100 X-RAY EXAM L-S SPINE 2/3 VWS: CPT | Mod: 26

## 2021-01-05 ENCOUNTER — RESULT REVIEW (OUTPATIENT)
Age: 66
End: 2021-01-05

## 2021-01-05 ENCOUNTER — APPOINTMENT (OUTPATIENT)
Dept: UROLOGY | Facility: CLINIC | Age: 66
End: 2021-01-05
Payer: MEDICARE

## 2021-01-05 ENCOUNTER — APPOINTMENT (OUTPATIENT)
Dept: CT IMAGING | Facility: CLINIC | Age: 66
End: 2021-01-05

## 2021-01-05 ENCOUNTER — TRANSCRIPTION ENCOUNTER (OUTPATIENT)
Age: 66
End: 2021-01-05

## 2021-01-05 ENCOUNTER — OUTPATIENT (OUTPATIENT)
Dept: OUTPATIENT SERVICES | Facility: HOSPITAL | Age: 66
LOS: 1 days | End: 2021-01-05
Payer: MEDICARE

## 2021-01-05 VITALS — RESPIRATION RATE: 14 BRPM | HEIGHT: 64 IN | BODY MASS INDEX: 36.02 KG/M2 | TEMPERATURE: 97.5 F | WEIGHT: 211 LBS

## 2021-01-05 DIAGNOSIS — M75.120 COMPLETE ROTATOR CUFF TEAR OR RUPTURE OF UNSPECIFIED SHOULDER, NOT SPECIFIED AS TRAUMATIC: Chronic | ICD-10-CM

## 2021-01-05 DIAGNOSIS — Z98.84 BARIATRIC SURGERY STATUS: Chronic | ICD-10-CM

## 2021-01-05 DIAGNOSIS — Z98.89 OTHER SPECIFIED POSTPROCEDURAL STATES: Chronic | ICD-10-CM

## 2021-01-05 DIAGNOSIS — K46.9 UNSPECIFIED ABDOMINAL HERNIA WITHOUT OBSTRUCTION OR GANGRENE: Chronic | ICD-10-CM

## 2021-01-05 DIAGNOSIS — L02.92 FURUNCLE, UNSPECIFIED: Chronic | ICD-10-CM

## 2021-01-05 DIAGNOSIS — Z41.1 ENCOUNTER FOR COSMETIC SURGERY: Chronic | ICD-10-CM

## 2021-01-05 DIAGNOSIS — R31.0 GROSS HEMATURIA: ICD-10-CM

## 2021-01-05 DIAGNOSIS — Z96.653 PRESENCE OF ARTIFICIAL KNEE JOINT, BILATERAL: Chronic | ICD-10-CM

## 2021-01-05 LAB
BILIRUB UR QL STRIP: NORMAL
CLARITY UR: NORMAL
COLLECTION METHOD: NORMAL
GLUCOSE UR-MCNC: NORMAL
HCG UR QL: 0.2 EU/DL
HGB UR QL STRIP.AUTO: NORMAL
KETONES UR-MCNC: NORMAL
LEUKOCYTE ESTERASE UR QL STRIP: NORMAL
NITRITE UR QL STRIP: NORMAL
PH UR STRIP: 5.5
PROT UR STRIP-MCNC: NORMAL
SP GR UR STRIP: 1.02

## 2021-01-05 PROCEDURE — 74178 CT ABD&PLV WO CNTR FLWD CNTR: CPT | Mod: 26

## 2021-01-05 PROCEDURE — 99214 OFFICE O/P EST MOD 30 MIN: CPT | Mod: 25

## 2021-01-05 PROCEDURE — 82565 ASSAY OF CREATININE: CPT

## 2021-01-05 PROCEDURE — 74178 CT ABD&PLV WO CNTR FLWD CNTR: CPT

## 2021-01-05 PROCEDURE — 81003 URINALYSIS AUTO W/O SCOPE: CPT | Mod: QW

## 2021-01-06 LAB
APPEARANCE: ABNORMAL
BACTERIA: NEGATIVE
BILIRUBIN URINE: NEGATIVE
BLOOD URINE: ABNORMAL
CALCIUM OXALATE CRYSTALS: ABNORMAL
COLOR: YELLOW
GLUCOSE QUALITATIVE U: NEGATIVE
HYALINE CASTS: 0 /LPF
KETONES URINE: NEGATIVE
LEUKOCYTE ESTERASE URINE: NEGATIVE
MICROSCOPIC-UA: NORMAL
NITRITE URINE: NEGATIVE
PH URINE: 6
PROTEIN URINE: NORMAL
RED BLOOD CELLS URINE: 15 /HPF
SPECIFIC GRAVITY URINE: 1.02
SQUAMOUS EPITHELIAL CELLS: 1 /HPF
URINE CYTOLOGY: NORMAL
UROBILINOGEN URINE: NORMAL
WHITE BLOOD CELLS URINE: 2 /HPF

## 2021-01-06 NOTE — ASSESSMENT
[FreeTextEntry1] : Gross hematuria:\par Discussed the differential diagnosis of hematuria including benign and malignant pathology- including but not limited to nephrolithiasis, bladder stone, urinary tract infection, glomerular disease, renal cancer, bladder cancer. \par Also discussed the chance that workup will not reveal a source for the bleeding. The patient understands that the hematuria could be from an upper tract (kidney or ureter) or lower tract (bladder, urethra) and that workup includes imaging and direct visualization of all of these.\par \par Will proceed with work up with Urinalysis with microscopy, Urine culture, Urine cytology, CT Urogram and Cystoscopy. \par \par Return to office after CT scan.

## 2021-01-06 NOTE — HISTORY OF PRESENT ILLNESS
[FreeTextEntry1] : 66 yo female presents for gross hematuria. \par Has been having off and on gross hematuria for last 1 year, was being treated with Antibiotics. \par Last urine negative for infection but still had gross hematuria. \par Complaining of off and on suprapubic pain and dysuria.  Has chronic back pain. \par Urinates every 3  hours or so during the day and night every 1-2 hours or so. \par Denies nausea, vomiting, fever, chills or rigors. \par Past smoker: < 1PPD for 20 years, quit 40 years ago.\par \par Initially seen for Ureteral stone.  \par Passed ureteral stone in 2018, did not follow up.\par Went to [facility] with severe Lt Flank pain, some nausea and vomiting. \par Had CT scan was told has kidney stone and sent home on Flomax, Pain meds. \par Did not see the stone pass. Pain has decreased but still needed pain meds off and on and was taking Flomax BID. Had chronic back pain, few weeks before the ER visit had been having back pain and passed 2 stones. No prior history of Kidney stone. \par Since ER visit had been urinating every hour or so with urgency. Saw some blood 2 days ago. \par \par

## 2021-01-06 NOTE — PHYSICAL EXAM
[Normal Appearance] : normal appearance [General Appearance - In No Acute Distress] : no acute distress [Abdomen Soft] : soft [Abdomen Tenderness] : non-tender [Costovertebral Angle Tenderness] : no ~M costovertebral angle tenderness [Skin Color & Pigmentation] : normal skin color and pigmentation [] : no respiratory distress [Oriented To Time, Place, And Person] : oriented to person, place, and time [Normal Station and Gait] : the gait and station were normal for the patient's age [No Focal Deficits] : no focal deficits [FreeTextEntry1] : normal peripheral circulation

## 2021-01-07 LAB — BACTERIA UR CULT: NORMAL

## 2021-01-11 LAB — SARS-COV-2 N GENE NPH QL NAA+PROBE: NOT DETECTED

## 2021-01-19 ENCOUNTER — APPOINTMENT (OUTPATIENT)
Dept: UROLOGY | Facility: CLINIC | Age: 66
End: 2021-01-19
Payer: MEDICARE

## 2021-01-19 ENCOUNTER — APPOINTMENT (OUTPATIENT)
Dept: FAMILY MEDICINE | Facility: CLINIC | Age: 66
End: 2021-01-19
Payer: MEDICARE

## 2021-01-19 VITALS
DIASTOLIC BLOOD PRESSURE: 80 MMHG | SYSTOLIC BLOOD PRESSURE: 130 MMHG | OXYGEN SATURATION: 98 % | HEIGHT: 64 IN | WEIGHT: 222 LBS | TEMPERATURE: 98 F | HEART RATE: 88 BPM | BODY MASS INDEX: 37.9 KG/M2

## 2021-01-19 VITALS — TEMPERATURE: 97.2 F

## 2021-01-19 DIAGNOSIS — K64.9 UNSPECIFIED HEMORRHOIDS: ICD-10-CM

## 2021-01-19 DIAGNOSIS — N28.1 CYST OF KIDNEY, ACQUIRED: ICD-10-CM

## 2021-01-19 DIAGNOSIS — F33.8 OTHER RECURRENT DEPRESSIVE DISORDERS: ICD-10-CM

## 2021-01-19 PROCEDURE — 99214 OFFICE O/P EST MOD 30 MIN: CPT

## 2021-01-19 NOTE — PHYSICAL EXAM
[Soft] : abdomen soft [No HSM] : no HSM [Normal] : affect was normal and insight and judgment were intact [de-identified] : mild lower abd tenderness

## 2021-01-19 NOTE — HISTORY OF PRESENT ILLNESS
[FreeTextEntry8] : Pt c/o continued hematuria. Pt has hx of kidney stones. \par Pt has hx Crohn's has not been tx in past and has been having bloody stools. Pt has hx anemia. \par Pt feeling depressed recently. Pt has gained 10 lbs in past few months. Pt talking to behavioral health mgr and trying to set up with long term CBT and psych.

## 2021-01-19 NOTE — ASSESSMENT
[FreeTextEntry1] : gross hematuria, hemorrhoid, crohn's, anemia - recheck cbc, fe levels. start anusol. f/u urology for hx renal stones. refer to colorectal for tx of crohn's.\par seasonal depression - f/u behavioral health mgr\par DM - cont meds, carb controlled diet advised \par hypothyroidism - refill levothyroxine

## 2021-01-20 ENCOUNTER — APPOINTMENT (OUTPATIENT)
Dept: FAMILY MEDICINE | Facility: CLINIC | Age: 66
End: 2021-01-20

## 2021-01-20 PROBLEM — Z20.822 EXPOSURE TO COVID-19 VIRUS: Status: ACTIVE | Noted: 2021-01-20

## 2021-01-21 ENCOUNTER — APPOINTMENT (OUTPATIENT)
Dept: FAMILY MEDICINE | Facility: CLINIC | Age: 66
End: 2021-01-21
Payer: MEDICARE

## 2021-01-21 LAB
BASOPHILS # BLD AUTO: 0.07 K/UL
BASOPHILS NFR BLD AUTO: 0.9 %
EOSINOPHIL # BLD AUTO: 0.12 K/UL
EOSINOPHIL NFR BLD AUTO: 1.6 %
FERRITIN SERPL-MCNC: 18 NG/ML
HCT VFR BLD CALC: 34.4 %
HGB BLD-MCNC: 9.9 G/DL
IMM GRANULOCYTES NFR BLD AUTO: 0.9 %
IRON SATN MFR SERPL: 11 %
IRON SERPL-MCNC: 50 UG/DL
LYMPHOCYTES # BLD AUTO: 1.14 K/UL
LYMPHOCYTES NFR BLD AUTO: 14.9 %
MAN DIFF?: NORMAL
MCHC RBC-ENTMCNC: 26.8 PG
MCHC RBC-ENTMCNC: 28.8 GM/DL
MCV RBC AUTO: 93.2 FL
MONOCYTES # BLD AUTO: 0.6 K/UL
MONOCYTES NFR BLD AUTO: 7.8 %
NEUTROPHILS # BLD AUTO: 5.66 K/UL
NEUTROPHILS NFR BLD AUTO: 73.9 %
PLATELET # BLD AUTO: 198 K/UL
RBC # BLD: 3.69 M/UL
RBC # FLD: 15.3 %
TIBC SERPL-MCNC: 446 UG/DL
UIBC SERPL-MCNC: 397 UG/DL
WBC # FLD AUTO: 7.66 K/UL

## 2021-01-21 PROCEDURE — 99211 OFF/OP EST MAY X REQ PHY/QHP: CPT | Mod: CS

## 2021-01-25 ENCOUNTER — APPOINTMENT (OUTPATIENT)
Dept: FAMILY MEDICINE | Facility: CLINIC | Age: 66
End: 2021-01-25
Payer: MEDICARE

## 2021-01-25 ENCOUNTER — APPOINTMENT (OUTPATIENT)
Dept: FAMILY MEDICINE | Facility: CLINIC | Age: 66
End: 2021-01-25

## 2021-01-25 ENCOUNTER — TRANSCRIPTION ENCOUNTER (OUTPATIENT)
Age: 66
End: 2021-01-25

## 2021-01-25 DIAGNOSIS — Z20.822 CONTACT WITH AND (SUSPECTED) EXPOSURE TO COVID-19: ICD-10-CM

## 2021-01-25 LAB — SARS-COV-2 N GENE NPH QL NAA+PROBE: NOT DETECTED

## 2021-01-25 PROCEDURE — 99211 OFF/OP EST MAY X REQ PHY/QHP: CPT | Mod: CS

## 2021-01-26 PROBLEM — N28.1 ACQUIRED RENAL CYST: Status: ACTIVE | Noted: 2021-01-26

## 2021-01-26 NOTE — PHYSICAL EXAM
[Normal Appearance] : normal appearance [General Appearance - In No Acute Distress] : no acute distress [Skin Color & Pigmentation] : normal skin color and pigmentation [FreeTextEntry1] : normal peripheral circulation [] : no respiratory distress [Oriented To Time, Place, And Person] : oriented to person, place, and time Name Of Product 9: NIA24 Skin Strengthening Complex

## 2021-01-26 NOTE — HISTORY OF PRESENT ILLNESS
Reviewed OPTIONS including AVASTIN/EYLEA/LUCENTIS and patient wants to proceed with EYLEA treatment AND REPEAT EVERY 5 WKS X 2. [FreeTextEntry1] : 64 yo female presents for follow up. \par Since last visit has had off and on gross hematuria, till yesterday and abdominal discomfort. \par Denies dysuria, nausea, vomiting, fever, chills or rigors. \par \par Recently seen for gross hematuria. \par Had been having off and on gross hematuria for last 1 year, was being treated with Antibiotics. \par Last urine negative for infection but still had gross hematuria. \par Complained of off and on suprapubic pain and dysuria.  Has chronic back pain. \par Urinates every 3  hours or so during the day and night every 1-2 hours or so. \par Denied nausea, vomiting, fever, chills or rigors. \par Past smoker: < 1PPD for 20 years, quit 40 years ago.\par \par Initially seen for Ureteral stone.  \par Passed ureteral stone in 2018, did not follow up.\par Went to [facility] with severe Lt Flank pain, some nausea and vomiting. \par Had CT scan was told has kidney stone and sent home on Flomax, Pain meds. \par Did not see the stone pass. Pain has decreased but still needed pain meds off and on and was taking Flomax BID. Had chronic back pain, few weeks before the ER visit had been having back pain and passed 2 stones. No prior history of Kidney stone. \par Since ER visit had been urinating every hour or so with urgency. Saw some blood 2 days ago. \par \par

## 2021-01-26 NOTE — ASSESSMENT
[FreeTextEntry1] : Kidney stone:\par Discussed the management options for non obstructing kidney stones- watch and wait Vs Ureteroscopy Vs Shock wave lithotripsy- if radio-opaque or ultrasound amenable. \par Risks and benefits of each modality were discussed. \par Patient will consider options. \par  \par Renal cyst:\par Discussed that Renal cysts are a common finding on routine radiological studies. Autopsy studies in patients over the age of 50 reveal greater than a 50% chance of having at least one simple renal cyst.\par And that renal cysts may be classified as simple or complex depending how they look on imaging. Discussed complexity of renal cysts and its implications as regards malignancy. \par \par Gross hematuria:\par Discussed work up of hematuria so far. Recommended Cystoscopy. Discussed risks and benefits. \par Patient agreeable.\par \par Return to clinic for next available Cystoscopy. \par

## 2021-01-27 LAB — SARS-COV-2 N GENE NPH QL NAA+PROBE: NOT DETECTED

## 2021-01-28 ENCOUNTER — TRANSCRIPTION ENCOUNTER (OUTPATIENT)
Age: 66
End: 2021-01-28

## 2021-02-01 ENCOUNTER — APPOINTMENT (OUTPATIENT)
Dept: OBGYN | Facility: CLINIC | Age: 66
End: 2021-02-01

## 2021-02-01 ENCOUNTER — TRANSCRIPTION ENCOUNTER (OUTPATIENT)
Age: 66
End: 2021-02-01

## 2021-02-03 ENCOUNTER — APPOINTMENT (OUTPATIENT)
Dept: COLORECTAL SURGERY | Facility: CLINIC | Age: 66
End: 2021-02-03

## 2021-02-09 ENCOUNTER — APPOINTMENT (OUTPATIENT)
Dept: UROLOGY | Facility: CLINIC | Age: 66
End: 2021-02-09

## 2021-02-10 ENCOUNTER — RX RENEWAL (OUTPATIENT)
Age: 66
End: 2021-02-10

## 2021-02-10 ENCOUNTER — NON-APPOINTMENT (OUTPATIENT)
Age: 66
End: 2021-02-10

## 2021-02-16 ENCOUNTER — APPOINTMENT (OUTPATIENT)
Dept: UROLOGY | Facility: CLINIC | Age: 66
End: 2021-02-16

## 2021-02-16 ENCOUNTER — APPOINTMENT (OUTPATIENT)
Dept: CARDIOLOGY | Facility: CLINIC | Age: 66
End: 2021-02-16
Payer: MEDICARE

## 2021-02-16 ENCOUNTER — NON-APPOINTMENT (OUTPATIENT)
Age: 66
End: 2021-02-16

## 2021-02-16 VITALS
SYSTOLIC BLOOD PRESSURE: 120 MMHG | OXYGEN SATURATION: 95 % | WEIGHT: 201 LBS | DIASTOLIC BLOOD PRESSURE: 80 MMHG | HEIGHT: 64 IN | BODY MASS INDEX: 34.31 KG/M2 | HEART RATE: 102 BPM

## 2021-02-16 PROCEDURE — 99214 OFFICE O/P EST MOD 30 MIN: CPT | Mod: CS,25

## 2021-02-16 PROCEDURE — 93000 ELECTROCARDIOGRAM COMPLETE: CPT

## 2021-02-16 NOTE — DISCUSSION/SUMMARY
[FreeTextEntry1] : Pt is a 66 y/o F who presents today for f/u - recently hospitalized for COVID\par Will get records\par repeat TTE\par VSS\par Pt will return in  mnths or sooner as needed but I encouraged communication via phone or portal if necessary. \par The described plan was discussed with the pt.  All questions and concerns were addressed to the best of my knowledge.\par

## 2021-02-16 NOTE — HISTORY OF PRESENT ILLNESS
[FreeTextEntry1] : Pt is a 66 y/o F who presents today for f/u.  Pt recently hospitalized for COVID at [facility] - she states that she was told "it affected the heart" - I do not have records.  She was discharged on Xarelto 10mg.  She is unsure exactly what they told her.  She is still with difficulty breathing.  Was not discharged on O2\par \par TTE 04/2018 normal LV function, mild DD, mild PI\par Nuclear stress test 09/2020 breast and diaphragm attenuation.  Small, mild defect in anterior wall that is reversible suggestive of mild ischemia, EF 66%\par \par PMH: DM, hypothyroidism, asthma, nephrolithiasis, gastric bypass\par Smoking status: quit 16 yrs ago\par Current exercise: none\par Daily water intake: 32 oz\par Daily caffeine intake: 1 cup coffee\par OTC medications: see list\par Family hx: mother and father stroke, mother PPM, father ?MI at age 70\par Previous cardiac testing: stress test many yrs ago "normal"\par Previous hospitalizations: knee and back surgery\par

## 2021-02-18 ENCOUNTER — APPOINTMENT (OUTPATIENT)
Dept: FAMILY MEDICINE | Facility: CLINIC | Age: 66
End: 2021-02-18

## 2021-02-19 ENCOUNTER — APPOINTMENT (OUTPATIENT)
Dept: RADIOLOGY | Facility: CLINIC | Age: 66
End: 2021-02-19
Payer: MEDICARE

## 2021-02-19 ENCOUNTER — APPOINTMENT (OUTPATIENT)
Dept: FAMILY MEDICINE | Facility: CLINIC | Age: 66
End: 2021-02-19
Payer: MEDICARE

## 2021-02-19 ENCOUNTER — OUTPATIENT (OUTPATIENT)
Dept: OUTPATIENT SERVICES | Facility: HOSPITAL | Age: 66
LOS: 1 days | End: 2021-02-19
Payer: MEDICARE

## 2021-02-19 VITALS
TEMPERATURE: 97.2 F | SYSTOLIC BLOOD PRESSURE: 116 MMHG | HEART RATE: 120 BPM | DIASTOLIC BLOOD PRESSURE: 80 MMHG | OXYGEN SATURATION: 94 % | BODY MASS INDEX: 35.17 KG/M2 | WEIGHT: 206 LBS | HEIGHT: 64 IN

## 2021-02-19 DIAGNOSIS — M75.120 COMPLETE ROTATOR CUFF TEAR OR RUPTURE OF UNSPECIFIED SHOULDER, NOT SPECIFIED AS TRAUMATIC: Chronic | ICD-10-CM

## 2021-02-19 DIAGNOSIS — Z98.89 OTHER SPECIFIED POSTPROCEDURAL STATES: Chronic | ICD-10-CM

## 2021-02-19 DIAGNOSIS — U07.1 COVID-19: ICD-10-CM

## 2021-02-19 DIAGNOSIS — L02.92 FURUNCLE, UNSPECIFIED: Chronic | ICD-10-CM

## 2021-02-19 DIAGNOSIS — Z96.653 PRESENCE OF ARTIFICIAL KNEE JOINT, BILATERAL: Chronic | ICD-10-CM

## 2021-02-19 DIAGNOSIS — K46.9 UNSPECIFIED ABDOMINAL HERNIA WITHOUT OBSTRUCTION OR GANGRENE: Chronic | ICD-10-CM

## 2021-02-19 DIAGNOSIS — Z41.1 ENCOUNTER FOR COSMETIC SURGERY: Chronic | ICD-10-CM

## 2021-02-19 DIAGNOSIS — Z98.84 BARIATRIC SURGERY STATUS: Chronic | ICD-10-CM

## 2021-02-19 PROCEDURE — 71046 X-RAY EXAM CHEST 2 VIEWS: CPT

## 2021-02-19 PROCEDURE — 99495 TRANSJ CARE MGMT MOD F2F 14D: CPT | Mod: CS

## 2021-02-19 PROCEDURE — 71046 X-RAY EXAM CHEST 2 VIEWS: CPT | Mod: 26

## 2021-02-21 NOTE — ASSESSMENT
[FreeTextEntry1] : dyspnea s/p COVID 19 infection, asthma, anxiety - klonopin bid prn severe anxiety. Possible adverse effects discussed with pt. start albuterol nebulizer prn dyspnea/wheeze. CXR. monitor dyspnea. if no improvement over time will refer to pulm for eval

## 2021-02-21 NOTE — HISTORY OF PRESENT ILLNESS
[Post-hospitalization from ___ Hospital] : Post-hospitalization from [unfilled] Hospital [Admitted on: ___] : The patient was admitted on [unfilled] [Discharged on ___] : discharged on [unfilled] [Discharge Med List] : discharge medication list [Med Reconciliation] : medication reconciliation has been completed [Patient Contacted By: ____] : and contacted by [unfilled] [FreeTextEntry2] : Pt s/p admission for COVID-19 infection and chest pain. Pt was tx with abx, steroids, O2 support, IV fluids, remdesivir. Since discharge pt c/o chest tightness, increased anxiety, dyspnea. Pt taking proventil with minimal improvement. \par Pt was discharged with decadron x 2 days, now completed and xarelto 10mg for DVT prophylaxis. Pt still c/o chest pains from L back into mid chest, dyspnea on exertion, occasional cough.

## 2021-02-21 NOTE — PHYSICAL EXAM
[Normal] : normal rate, regular rhythm, normal S1 and S2 and no murmur heard [Soft] : abdomen soft [Non Tender] : non-tender [No Rash] : no rash [Normal Insight/Judgement] : insight and judgment were intact [de-identified] : anxious affect

## 2021-02-21 NOTE — REVIEW OF SYSTEMS
[Fatigue] : fatigue [Wheezing] : wheezing [Dyspnea on Exertion] : dyspnea on exertion [Anxiety] : anxiety [Negative] : Neurological

## 2021-02-22 ENCOUNTER — RX RENEWAL (OUTPATIENT)
Age: 66
End: 2021-02-22

## 2021-03-02 ENCOUNTER — RX RENEWAL (OUTPATIENT)
Age: 66
End: 2021-03-02

## 2021-03-03 ENCOUNTER — APPOINTMENT (OUTPATIENT)
Dept: NEUROLOGY | Facility: CLINIC | Age: 66
End: 2021-03-03
Payer: MEDICARE

## 2021-03-03 VITALS
WEIGHT: 208 LBS | DIASTOLIC BLOOD PRESSURE: 69 MMHG | TEMPERATURE: 97.8 F | SYSTOLIC BLOOD PRESSURE: 105 MMHG | HEIGHT: 64 IN | BODY MASS INDEX: 35.51 KG/M2 | HEART RATE: 112 BPM

## 2021-03-03 PROCEDURE — 99214 OFFICE O/P EST MOD 30 MIN: CPT

## 2021-03-03 RX ORDER — LEVETIRACETAM 500 MG/1
500 TABLET, FILM COATED ORAL TWICE DAILY
Qty: 180 | Refills: 0 | Status: DISCONTINUED | COMMUNITY
End: 2021-03-03

## 2021-03-03 RX ORDER — AZITHROMYCIN 250 MG/1
250 TABLET, FILM COATED ORAL
Qty: 1 | Refills: 0 | Status: DISCONTINUED | COMMUNITY
Start: 2021-01-20 | End: 2021-03-03

## 2021-03-03 NOTE — PHYSICAL EXAM
[FreeTextEntry1] : Examination:\par Constitutional: normal, no apparent distress\par Eyes: normal conjunctiva b/l, no ptosis, visual fields full\par Respiratory: no respiratory distress, normal effort, normal auscultation\par Cardiovascular: normal rate, rhythm, no murmurs\par Neck: supple, no masses\par Vascular: carotids normal\par Skin: normal color, no rashes\par Psych: normal mood, affect\par \par Neurological:\par Memory: oriented to person, place, time\par Language intact/no aphasia\par Cranial Nerves: II-XII intact, Pupils equally round and reactive to light, ocular muscles/movements intact, no ptosis, no facial weakness, tongue protrudes normally in the midline, \par Motor: normal tone, no pronator drift, full strength in all four extremities in the proximal and distal muscle groups\par Coordination: Fine motor movements intact, rapid alternating movements intact, finger to nose intact bilaterally\par Sensory: intact to light touch\par DTRs: symmetric, normal\par Gait: slow\par

## 2021-03-03 NOTE — HISTORY OF PRESENT ILLNESS
[FreeTextEntry1] : I last saw Ms. García on 11/19/20.\par She was admitted to the hospital in February with covid-19. she was treated with antibiotics, steroids, Oxygen, iV fluids and remdesivir.\par \par She saw Dr. Callahan on 2/16/21.\par \par She is still having shortness of breath.\par \par She feels that her memory is worse since having covid.\par She sometimes sees sparkles and dots.\par She has been monitoring her sugars. They tend to run in the upper 100s to mid 200s.\par \par She is going to start sessions with a therapist next week.\par \par She has not had any recent falls. \par \par She takes several medications on an as needed basis including:\par cyclobenzaprine\par Tramadol\par Clonazepam

## 2021-03-03 NOTE — DATA REVIEWED
[de-identified] : MRI brain without contrast 1/8/20: \par No evidence of acute infarction, hemorrhage or hydrocephalus.\par Minimal non-acute small vessel ischemic change.\par \par MRI brain with and without contrast and seizure protocol 9/14/20:\par \par No hydrocephalus, mass effect, acute intracranial hemorrhage, vasogenic edema, or acute territorial infarct.\par \par Tiny nonspecific focus of increased T2 and FLAIR hyperintense signal involving the superior right insular cortex (6-20). This could represent a small chronic infarct. Cortical dysplasia is considered much less likely.\par \par No abnormal parenchymal or leptomeningeal enhancement.\par \par No evidence for mesial temporal sclerosis or gray matter heterotopia.\par  [de-identified] : Routine EEG 8/19/20: \par This was an abnormal EEG study in the awake and drowsy states due to the presence of:\par -Bilateral independent temporal sharp waves, left more frequent than right, with phase reversal at F7 and F8.\par \par EEG Impression/Clinical Correlate:\par The findings are consistent with a risk for focal onset seizures.\par  Consider a repeat study if clinically indicated. \par \par 24 hour EEG 8/19/20-8/20/20:\par \par EEG Summary/Classification:\par This was an abnormal EEG study in the awake, drowsy, and asleep states due to the presence of bilateral independent temporal sharp waves with phase reversal at F7 and F8, seen more frequently on the left.\par \par EEG Impression/Clinical Correlate:\par The findings are suggestive of an risk for focal onset seizures. \par  [de-identified] : Blood tests 6/22/20:\par TSH 3.1, vitamin D 27.4\par Blood tests 1/8/20: B12 667\par HbA1C 7.6

## 2021-03-03 NOTE — DISCUSSION/SUMMARY
[FreeTextEntry1] : Ms. García is a 65 year old woman who initially presented for evaluation of frequent falls.\par She also reports having concerns about her memory.\par \par She has had exacerbation of symptoms since infection with covid-19.\par \par Frequent Falls\par -Falls have improved overall\par -No  suggestion of NPH on MRI.\par -EEG showed some b/l temporal sharp waves. Temporal lobe seizures would not necessarily cause falls.\par -Gait improved since spine surgery\par \par \par Memory Impairment\par -She scored 21/30 on the [place] Cognitive Assessment initially with delayed recall being the area of worst performance.\par -Vitamin B12 and TSH levels normal.\par -EEG abnormal.\par -Now with worsening of memory since covid, possibly anxiety component and brain fog\par -Will work with therapist\par -Repeat EEG\par -Advised to allow herself time for brain fog to improve\par -Participate in mentally stimulating activities. Encouraged reading, puzzles, etc.\par \par \par Possible seizures.\par -Possible focal seizures contributing to memory loss. EEG was abnormal\par -Did not tolerate Keppra\par -continue oxcarbazepine 450 mg BID\par -Na levels previously normal - 143\par \par f/u 6 weeks sooner if needed\par

## 2021-03-09 ENCOUNTER — APPOINTMENT (OUTPATIENT)
Dept: UROLOGY | Facility: CLINIC | Age: 66
End: 2021-03-09
Payer: MEDICARE

## 2021-03-09 ENCOUNTER — TRANSCRIPTION ENCOUNTER (OUTPATIENT)
Age: 66
End: 2021-03-09

## 2021-03-09 VITALS — SYSTOLIC BLOOD PRESSURE: 113 MMHG | TEMPERATURE: 97.3 F | DIASTOLIC BLOOD PRESSURE: 74 MMHG | HEART RATE: 106 BPM

## 2021-03-09 PROCEDURE — 52000 CYSTOURETHROSCOPY: CPT

## 2021-03-10 ENCOUNTER — APPOINTMENT (OUTPATIENT)
Dept: NEUROLOGY | Facility: CLINIC | Age: 66
End: 2021-03-10
Payer: MEDICARE

## 2021-03-10 ENCOUNTER — NON-APPOINTMENT (OUTPATIENT)
Age: 66
End: 2021-03-10

## 2021-03-10 LAB
APPEARANCE: ABNORMAL
BACTERIA: NEGATIVE
BILIRUBIN URINE: NEGATIVE
BLOOD URINE: ABNORMAL
COLOR: ABNORMAL
GLUCOSE QUALITATIVE U: NEGATIVE
HYALINE CASTS: 0 /LPF
KETONES URINE: NEGATIVE
LEUKOCYTE ESTERASE URINE: NEGATIVE
MICROSCOPIC-UA: NORMAL
NITRITE URINE: NEGATIVE
PH URINE: 6
PROTEIN URINE: ABNORMAL
RED BLOOD CELLS URINE: 42 /HPF
SPECIFIC GRAVITY URINE: 1.01
SQUAMOUS EPITHELIAL CELLS: 5 /HPF
URINE COMMENTS: NORMAL
UROBILINOGEN URINE: NORMAL
WHITE BLOOD CELLS URINE: 7 /HPF

## 2021-03-10 PROCEDURE — 95816 EEG AWAKE AND DROWSY: CPT

## 2021-03-11 LAB — BACTERIA UR CULT: NORMAL

## 2021-03-13 ENCOUNTER — APPOINTMENT (OUTPATIENT)
Dept: FAMILY MEDICINE | Facility: CLINIC | Age: 66
End: 2021-03-13
Payer: MEDICARE

## 2021-03-13 VITALS
TEMPERATURE: 97.5 F | HEIGHT: 64 IN | BODY MASS INDEX: 35.51 KG/M2 | SYSTOLIC BLOOD PRESSURE: 122 MMHG | OXYGEN SATURATION: 96 % | HEART RATE: 82 BPM | WEIGHT: 208 LBS | DIASTOLIC BLOOD PRESSURE: 84 MMHG

## 2021-03-13 DIAGNOSIS — M54.9 DORSALGIA, UNSPECIFIED: ICD-10-CM

## 2021-03-13 DIAGNOSIS — G89.29 DORSALGIA, UNSPECIFIED: ICD-10-CM

## 2021-03-13 PROCEDURE — 36415 COLL VENOUS BLD VENIPUNCTURE: CPT

## 2021-03-13 PROCEDURE — 99214 OFFICE O/P EST MOD 30 MIN: CPT | Mod: 25

## 2021-03-13 NOTE — ASSESSMENT
[FreeTextEntry1] : Chronic back pain- script sent for Cyclobenzaprine, advised to f/u with pain management\par Anemia- multifactorial, has hematuria for which she is following with urology, has h/o Crohn's, will check blood work, blood drawn in office, c/w iron supplementation, advised if hemoglobin drops low, would recommend ER evaluation, would recommend hematology follow up\par Anxiety- will be seeing a therapist \par \par advised to set up follow up appointment with PCP

## 2021-03-13 NOTE — PHYSICAL EXAM
[No Acute Distress] : no acute distress [Well Nourished] : well nourished [Well Developed] : well developed [Normal Appearance] : was normal in appearance [Neck Supple] : was supple [No Respiratory Distress] : no respiratory distress  [Clear to Auscultation] : lungs were clear to auscultation bilaterally [Normal Rate] : normal rate  [Regular Rhythm] : with a regular rhythm [Normal S1, S2] : normal S1 and S2 [No Murmur] : no murmur heard [Alert and Oriented x3] : oriented to person, place, and time [Anxious] : anxious [de-identified] : tenderness to lower back- lumbar region

## 2021-03-13 NOTE — REVIEW OF SYSTEMS
[Back Pain] : back pain [Constipation] : constipation [Fever] : no fever [Chills] : no chills [Chest Pain] : no chest pain [Shortness Of Breath] : no shortness of breath

## 2021-03-13 NOTE — HISTORY OF PRESENT ILLNESS
[FreeTextEntry8] : \par 65 year old female presents requesting script for Cyclobenzaprine\par \par Has chronic low back pain- follows with pain management \par on Tramadol 50 mg, Oxycodone 5 mg \par feeling constipated recently- trying to cut back on narcotics (taking stool softener)\par has been prescribed Cyclobenzaprine which has helped- unable to reach her pain management and requesting refill \par \par has gross hematuria\par has been following with urology\par had cystoscopy recently\par anemic- on iron supplementation \par \par has h/o anemia- did see hematology/oncology last month \par h/o Crohn's- has had GI evaluation \par \par has anxiety/depression\par will be following with a therapist

## 2021-03-15 LAB
ALBUMIN SERPL ELPH-MCNC: 4.1 G/DL
ALP BLD-CCNC: 78 U/L
ALT SERPL-CCNC: 29 U/L
ANION GAP SERPL CALC-SCNC: 10 MMOL/L
AST SERPL-CCNC: 35 U/L
BASOPHILS # BLD AUTO: 0.05 K/UL
BASOPHILS NFR BLD AUTO: 0.9 %
BILIRUB SERPL-MCNC: 0.2 MG/DL
BUN SERPL-MCNC: 20 MG/DL
CALCIUM SERPL-MCNC: 9.5 MG/DL
CHLORIDE SERPL-SCNC: 104 MMOL/L
CO2 SERPL-SCNC: 27 MMOL/L
CREAT SERPL-MCNC: 0.53 MG/DL
EOSINOPHIL # BLD AUTO: 0.08 K/UL
EOSINOPHIL NFR BLD AUTO: 1.4 %
ESTIMATED AVERAGE GLUCOSE: 143 MG/DL
FERRITIN SERPL-MCNC: 64 NG/ML
GLUCOSE SERPL-MCNC: 192 MG/DL
HBA1C MFR BLD HPLC: 6.6 %
HCT VFR BLD CALC: 36.8 %
HGB BLD-MCNC: 11.1 G/DL
IMM GRANULOCYTES NFR BLD AUTO: 0.9 %
IRON SATN MFR SERPL: 13 %
IRON SERPL-MCNC: 48 UG/DL
LYMPHOCYTES # BLD AUTO: 1.01 K/UL
LYMPHOCYTES NFR BLD AUTO: 17.2 %
MAN DIFF?: NORMAL
MCHC RBC-ENTMCNC: 29.4 PG
MCHC RBC-ENTMCNC: 30.2 GM/DL
MCV RBC AUTO: 97.6 FL
MONOCYTES # BLD AUTO: 0.6 K/UL
MONOCYTES NFR BLD AUTO: 10.2 %
NEUTROPHILS # BLD AUTO: 4.09 K/UL
NEUTROPHILS NFR BLD AUTO: 69.4 %
PLATELET # BLD AUTO: 218 K/UL
POTASSIUM SERPL-SCNC: 4.4 MMOL/L
PROT SERPL-MCNC: 6.5 G/DL
RBC # BLD: 3.77 M/UL
RBC # FLD: 17.9 %
SODIUM SERPL-SCNC: 142 MMOL/L
TIBC SERPL-MCNC: 370 UG/DL
UIBC SERPL-MCNC: 321 UG/DL
WBC # FLD AUTO: 5.88 K/UL

## 2021-03-18 ENCOUNTER — APPOINTMENT (OUTPATIENT)
Dept: FAMILY MEDICINE | Facility: CLINIC | Age: 66
End: 2021-03-18
Payer: MEDICARE

## 2021-03-18 VITALS
TEMPERATURE: 97.5 F | HEIGHT: 64 IN | WEIGHT: 212 LBS | OXYGEN SATURATION: 97 % | DIASTOLIC BLOOD PRESSURE: 84 MMHG | BODY MASS INDEX: 36.19 KG/M2 | HEART RATE: 84 BPM | SYSTOLIC BLOOD PRESSURE: 130 MMHG

## 2021-03-18 PROCEDURE — 99214 OFFICE O/P EST MOD 30 MIN: CPT | Mod: CS

## 2021-03-18 NOTE — PHYSICAL EXAM
[No Respiratory Distress] : no respiratory distress  [No Accessory Muscle Use] : no accessory muscle use [Soft] : abdomen soft [Non Tender] : non-tender [Normal] : affect was normal and insight and judgment were intact [de-identified] : mild exp wheeze b/l

## 2021-03-18 NOTE — ASSESSMENT
[FreeTextEntry1] : hx COVID-19 infx, asthma - start advair 100-50 bid. Possible adverse effects discussed with pt. recheck COVID PCR prior to procedure. Will refer to pulm if dyspnea does not improve\par DM- a1c increasing, Advised lifestyle modifications, carb controlled diet to prevent worsening of DM

## 2021-03-18 NOTE — HISTORY OF PRESENT ILLNESS
[de-identified] : Pt s/p COVID b/l pna in 02/2021. Pt still c/o dyspnea, wheezing often. Pt using albuterol daily. Pt does have hx of asthma but is not on a daily medication. A1c increased back to 6.6 on 3/15/21 labs. \par Pt seeing uro for possible bladder lesion, hematuria. Pt to get cystoscopy on 4/12/21. Pt's urologist requests COVID testing today. Pt needs COVID test 4-5 days prior. \par Pt has been constipated more as she is supplementing Fe due to chronic anemia. \par

## 2021-03-22 LAB — SARS-COV-2 N GENE NPH QL NAA+PROBE: NOT DETECTED

## 2021-04-01 ENCOUNTER — OUTPATIENT (OUTPATIENT)
Dept: OUTPATIENT SERVICES | Facility: HOSPITAL | Age: 66
LOS: 1 days | End: 2021-04-01
Payer: MEDICARE

## 2021-04-01 ENCOUNTER — RESULT REVIEW (OUTPATIENT)
Age: 66
End: 2021-04-01

## 2021-04-01 VITALS
TEMPERATURE: 98 F | RESPIRATION RATE: 16 BRPM | DIASTOLIC BLOOD PRESSURE: 81 MMHG | WEIGHT: 214.95 LBS | OXYGEN SATURATION: 97 % | SYSTOLIC BLOOD PRESSURE: 143 MMHG | HEART RATE: 79 BPM | HEIGHT: 64 IN

## 2021-04-01 DIAGNOSIS — Z98.89 OTHER SPECIFIED POSTPROCEDURAL STATES: Chronic | ICD-10-CM

## 2021-04-01 DIAGNOSIS — M75.120 COMPLETE ROTATOR CUFF TEAR OR RUPTURE OF UNSPECIFIED SHOULDER, NOT SPECIFIED AS TRAUMATIC: Chronic | ICD-10-CM

## 2021-04-01 DIAGNOSIS — Z41.1 ENCOUNTER FOR COSMETIC SURGERY: Chronic | ICD-10-CM

## 2021-04-01 DIAGNOSIS — Z01.818 ENCOUNTER FOR OTHER PREPROCEDURAL EXAMINATION: ICD-10-CM

## 2021-04-01 DIAGNOSIS — R31.0 GROSS HEMATURIA: ICD-10-CM

## 2021-04-01 DIAGNOSIS — Z96.653 PRESENCE OF ARTIFICIAL KNEE JOINT, BILATERAL: Chronic | ICD-10-CM

## 2021-04-01 DIAGNOSIS — Z98.84 BARIATRIC SURGERY STATUS: Chronic | ICD-10-CM

## 2021-04-01 DIAGNOSIS — L02.92 FURUNCLE, UNSPECIFIED: Chronic | ICD-10-CM

## 2021-04-01 DIAGNOSIS — K46.9 UNSPECIFIED ABDOMINAL HERNIA WITHOUT OBSTRUCTION OR GANGRENE: Chronic | ICD-10-CM

## 2021-04-01 DIAGNOSIS — Z90.49 ACQUIRED ABSENCE OF OTHER SPECIFIED PARTS OF DIGESTIVE TRACT: Chronic | ICD-10-CM

## 2021-04-01 LAB
ANION GAP SERPL CALC-SCNC: 4 MMOL/L — LOW (ref 5–17)
APPEARANCE UR: CLEAR — SIGNIFICANT CHANGE UP
APTT BLD: 29.8 SEC — SIGNIFICANT CHANGE UP (ref 27.5–35.5)
BASOPHILS # BLD AUTO: 0.05 K/UL — SIGNIFICANT CHANGE UP (ref 0–0.2)
BASOPHILS NFR BLD AUTO: 0.8 % — SIGNIFICANT CHANGE UP (ref 0–2)
BILIRUB UR-MCNC: NEGATIVE — SIGNIFICANT CHANGE UP
BUN SERPL-MCNC: 15 MG/DL — SIGNIFICANT CHANGE UP (ref 7–23)
CALCIUM SERPL-MCNC: 9.5 MG/DL — SIGNIFICANT CHANGE UP (ref 8.5–10.1)
CHLORIDE SERPL-SCNC: 108 MMOL/L — SIGNIFICANT CHANGE UP (ref 96–108)
CO2 SERPL-SCNC: 30 MMOL/L — SIGNIFICANT CHANGE UP (ref 22–31)
COLOR SPEC: YELLOW — SIGNIFICANT CHANGE UP
CREAT SERPL-MCNC: 0.65 MG/DL — SIGNIFICANT CHANGE UP (ref 0.5–1.3)
DIFF PNL FLD: ABNORMAL
EOSINOPHIL # BLD AUTO: 0.11 K/UL — SIGNIFICANT CHANGE UP (ref 0–0.5)
EOSINOPHIL NFR BLD AUTO: 1.8 % — SIGNIFICANT CHANGE UP (ref 0–6)
GLUCOSE SERPL-MCNC: 140 MG/DL — HIGH (ref 70–99)
GLUCOSE UR QL: NEGATIVE MG/DL — SIGNIFICANT CHANGE UP
HCT VFR BLD CALC: 34.4 % — LOW (ref 34.5–45)
HGB BLD-MCNC: 10.8 G/DL — LOW (ref 11.5–15.5)
IMM GRANULOCYTES NFR BLD AUTO: 0.7 % — SIGNIFICANT CHANGE UP (ref 0–1.5)
INR BLD: 0.93 RATIO — SIGNIFICANT CHANGE UP (ref 0.88–1.16)
KETONES UR-MCNC: NEGATIVE — SIGNIFICANT CHANGE UP
LEUKOCYTE ESTERASE UR-ACNC: ABNORMAL
LYMPHOCYTES # BLD AUTO: 1.23 K/UL — SIGNIFICANT CHANGE UP (ref 1–3.3)
LYMPHOCYTES # BLD AUTO: 20.4 % — SIGNIFICANT CHANGE UP (ref 13–44)
MCHC RBC-ENTMCNC: 29.8 PG — SIGNIFICANT CHANGE UP (ref 27–34)
MCHC RBC-ENTMCNC: 31.4 GM/DL — LOW (ref 32–36)
MCV RBC AUTO: 95 FL — SIGNIFICANT CHANGE UP (ref 80–100)
MONOCYTES # BLD AUTO: 0.44 K/UL — SIGNIFICANT CHANGE UP (ref 0–0.9)
MONOCYTES NFR BLD AUTO: 7.3 % — SIGNIFICANT CHANGE UP (ref 2–14)
NEUTROPHILS # BLD AUTO: 4.15 K/UL — SIGNIFICANT CHANGE UP (ref 1.8–7.4)
NEUTROPHILS NFR BLD AUTO: 69 % — SIGNIFICANT CHANGE UP (ref 43–77)
NITRITE UR-MCNC: NEGATIVE — SIGNIFICANT CHANGE UP
PH UR: 5 — SIGNIFICANT CHANGE UP (ref 5–8)
PLATELET # BLD AUTO: 208 K/UL — SIGNIFICANT CHANGE UP (ref 150–400)
POTASSIUM SERPL-MCNC: 4.1 MMOL/L — SIGNIFICANT CHANGE UP (ref 3.5–5.3)
POTASSIUM SERPL-SCNC: 4.1 MMOL/L — SIGNIFICANT CHANGE UP (ref 3.5–5.3)
PROT UR-MCNC: NEGATIVE MG/DL — SIGNIFICANT CHANGE UP
PROTHROM AB SERPL-ACNC: 10.9 SEC — SIGNIFICANT CHANGE UP (ref 10.6–13.6)
RBC # BLD: 3.62 M/UL — LOW (ref 3.8–5.2)
RBC # FLD: 16 % — HIGH (ref 10.3–14.5)
SODIUM SERPL-SCNC: 142 MMOL/L — SIGNIFICANT CHANGE UP (ref 135–145)
SP GR SPEC: 1.01 — SIGNIFICANT CHANGE UP (ref 1.01–1.02)
UROBILINOGEN FLD QL: NEGATIVE MG/DL — SIGNIFICANT CHANGE UP
WBC # BLD: 6.02 K/UL — SIGNIFICANT CHANGE UP (ref 3.8–10.5)
WBC # FLD AUTO: 6.02 K/UL — SIGNIFICANT CHANGE UP (ref 3.8–10.5)

## 2021-04-01 PROCEDURE — 81001 URINALYSIS AUTO W/SCOPE: CPT

## 2021-04-01 PROCEDURE — 86901 BLOOD TYPING SEROLOGIC RH(D): CPT

## 2021-04-01 PROCEDURE — 85025 COMPLETE CBC W/AUTO DIFF WBC: CPT

## 2021-04-01 PROCEDURE — 36415 COLL VENOUS BLD VENIPUNCTURE: CPT

## 2021-04-01 PROCEDURE — 80048 BASIC METABOLIC PNL TOTAL CA: CPT

## 2021-04-01 PROCEDURE — 85610 PROTHROMBIN TIME: CPT

## 2021-04-01 PROCEDURE — 87086 URINE CULTURE/COLONY COUNT: CPT

## 2021-04-01 PROCEDURE — 85730 THROMBOPLASTIN TIME PARTIAL: CPT

## 2021-04-01 PROCEDURE — 86900 BLOOD TYPING SEROLOGIC ABO: CPT

## 2021-04-01 PROCEDURE — 71046 X-RAY EXAM CHEST 2 VIEWS: CPT | Mod: 26

## 2021-04-01 PROCEDURE — 71046 X-RAY EXAM CHEST 2 VIEWS: CPT

## 2021-04-01 PROCEDURE — 86850 RBC ANTIBODY SCREEN: CPT

## 2021-04-01 PROCEDURE — 83036 HEMOGLOBIN GLYCOSYLATED A1C: CPT

## 2021-04-01 NOTE — H&P PST ADULT - ASSESSMENT
65 year old female with bladder tumor she presents to PST for planned cystoscopy TURBT    Plan:  1. PST instructions given ; NPO status instructions to be given by ASU   2. Pt instructed to take following meds with sip of water :   3. Pt instructed to take routine evening medications unless indicated   4. Stop NSAIDS ( Aspirin Alev Motrin Mobic Diclofenac), herbal supplements , MVI , Vitamin fish oil 7 days prior to surgery  unless   directed by surgeon or cardiologist;   5. Medical Optimization  with    6. EZ wash instructions given & mupirocin instructions given  7. Labs EKG CXR as per surgeon request   8. Pt instructed to self quarantine after Covid test   9. Covid Testing scheduled Pt notified and aware  10. Pt denies covid symptoms shortness of breath fever cough    65 year old female with bladder tumor she presents to PST for planned cystoscopy TURBT    Plan:  1. PST instructions given ; NPO status instructions to be given by ASU   2. Pt instructed to take following meds with sip of water : pantoprazole synthroid oxcarbazepine ; asthma meds   3. Pt instructed to take routine evening medications unless indicated   4. Stop NSAIDS ( Aspirin Alev Motrin Mobic Diclofenac), herbal supplements , MVI , Vitamin fish oil 7 days prior to surgery  unless   directed by surgeon or cardiologist;   5. Medical Optimization  with Dr John Reyes   6. Pt denies covid symptoms shortness of breath fever cough   7. Labs EKG CXR as per surgeon request   8. Pt instructed to self quarantine after Covid test   9. Covid Testing scheduled Pt notified and aware

## 2021-04-01 NOTE — H&P PST ADULT - NSICDXPASTMEDICALHX_GEN_ALL_CORE_FT
PAST MEDICAL HISTORY:  Anemia     Anxiety and depression     Asthma     Bladder tumor     Cardiac murmur     Carotid stenosis     Carpal tunnel syndrome     Cataract     Cellulitis     Colitis     COVID-19 2/2021    Crohns disease     Degenerative disc disease, lumbar     Diabetes mellitus     DJD (degenerative joint disease) B/L Knee Replacement    Dry eyes     Exostosis right middle finger    GERD (gastroesophageal reflux disease)     H/O cervical fracture     H/O fracture of humerus     H/O herpes zoster     Headache     Hemorrhoid     Hypotension     Hypothyroid     Lumbar radiculopathy     Morbid obesity S/P Gastric By-Pass surgery    Muscle spasm of both lower legs     Obese     Panic attacks     Plantar fasciitis     PUD (peptic ulcer disease)     PUD (peptic ulcer disease)     Reflux     Renal calculi     Renal cyst     Rheumatoid arthritis     Seasonal allergic rhinitis     Seizure     Sleep apnea

## 2021-04-01 NOTE — H&P PST ADULT - NSICDXFAMILYHX_GEN_ALL_CORE_FT
FAMILY HISTORY:  Father  Still living? Yes, Estimated age: Age Unknown  Family history of Alzheimer's disease, Age at diagnosis: Age Unknown  Family history of cerebrovascular accident (CVA), Age at diagnosis: Age Unknown    Mother  Still living? Yes, Estimated age: Age Unknown  Family history of arthritis, Age at diagnosis: Age Unknown  Family history of cerebrovascular accident (CVA), Age at diagnosis: Age Unknown

## 2021-04-01 NOTE — H&P PST ADULT - NEGATIVE CARDIOVASCULAR SYMPTOMS
Kierra suspects she has a UTI, has OV with Dr. Austin on Monday but wonders if she can come in today and submit a UA.  Can you call Kierra (on cell) and let her know if / when order is placed ?    Thanks  Lindsay Quijano RN  FNA     Patient called stated he is going out of town on the 14th just for the weekend. Dr Dhruv Rojas approved he can skip his radiation and he wants to know if he can skip the xeloda also. He would like a callback.     [phone] no chest pain/no palpitations/no dyspnea on exertion

## 2021-04-01 NOTE — H&P PST ADULT - NSICDXPASTSURGICALHX_GEN_ALL_CORE_FT
PAST SURGICAL HISTORY:  Abdominal hernia 5 times    Boils left arm, sweat gland removed    Complete rotator cuff tear right x1 left x2    Gastric bypass status for obesity 15 years ago and revision 1 year later    H/O rhinoplasty 1975    H/O vein stripping     History of back surgery placement 2 rods and 6 screws 2013 2020    History of carpal tunnel release     History of cholecystectomy     History of nasal surgery fractured 2 times repaired    History of tonsillectomy     S/P total knee arthroplasty, bilateral left x1 right x2

## 2021-04-02 DIAGNOSIS — Z01.818 ENCOUNTER FOR OTHER PREPROCEDURAL EXAMINATION: ICD-10-CM

## 2021-04-02 DIAGNOSIS — R31.0 GROSS HEMATURIA: ICD-10-CM

## 2021-04-02 LAB
A1C WITH ESTIMATED AVERAGE GLUCOSE RESULT: 6.3 % — HIGH (ref 4–5.6)
CULTURE RESULTS: SIGNIFICANT CHANGE UP
ESTIMATED AVERAGE GLUCOSE: 134 MG/DL — HIGH (ref 68–114)
SPECIMEN SOURCE: SIGNIFICANT CHANGE UP

## 2021-04-06 ENCOUNTER — APPOINTMENT (OUTPATIENT)
Dept: FAMILY MEDICINE | Facility: CLINIC | Age: 66
End: 2021-04-06
Payer: MEDICARE

## 2021-04-06 ENCOUNTER — APPOINTMENT (OUTPATIENT)
Dept: COLORECTAL SURGERY | Facility: CLINIC | Age: 66
End: 2021-04-06
Payer: MEDICARE

## 2021-04-06 VITALS
HEART RATE: 57 BPM | SYSTOLIC BLOOD PRESSURE: 130 MMHG | WEIGHT: 220 LBS | OXYGEN SATURATION: 96 % | DIASTOLIC BLOOD PRESSURE: 84 MMHG | BODY MASS INDEX: 37.56 KG/M2 | TEMPERATURE: 97.5 F | HEIGHT: 64 IN

## 2021-04-06 VITALS
TEMPERATURE: 98.8 F | RESPIRATION RATE: 14 BRPM | DIASTOLIC BLOOD PRESSURE: 84 MMHG | BODY MASS INDEX: 35.85 KG/M2 | HEART RATE: 85 BPM | HEIGHT: 64 IN | WEIGHT: 210 LBS | SYSTOLIC BLOOD PRESSURE: 143 MMHG

## 2021-04-06 PROBLEM — N20.0 CALCULUS OF KIDNEY: Chronic | Status: ACTIVE | Noted: 2021-04-01

## 2021-04-06 PROBLEM — E66.9 OBESITY, UNSPECIFIED: Chronic | Status: ACTIVE | Noted: 2021-04-01

## 2021-04-06 PROBLEM — I65.29 OCCLUSION AND STENOSIS OF UNSPECIFIED CAROTID ARTERY: Chronic | Status: ACTIVE | Noted: 2021-04-01

## 2021-04-06 PROBLEM — J30.2 OTHER SEASONAL ALLERGIC RHINITIS: Chronic | Status: ACTIVE | Noted: 2021-04-01

## 2021-04-06 PROBLEM — Z87.81 PERSONAL HISTORY OF (HEALED) TRAUMATIC FRACTURE: Chronic | Status: ACTIVE | Noted: 2021-04-01

## 2021-04-06 PROBLEM — F41.9 ANXIETY DISORDER, UNSPECIFIED: Chronic | Status: ACTIVE | Noted: 2021-04-01

## 2021-04-06 PROBLEM — R01.1 CARDIAC MURMUR, UNSPECIFIED: Chronic | Status: ACTIVE | Noted: 2021-04-01

## 2021-04-06 PROBLEM — H04.123 DRY EYE SYNDROME OF BILATERAL LACRIMAL GLANDS: Chronic | Status: ACTIVE | Noted: 2021-04-01

## 2021-04-06 PROBLEM — R56.9 UNSPECIFIED CONVULSIONS: Chronic | Status: ACTIVE | Noted: 2021-04-01

## 2021-04-06 PROBLEM — M72.2 PLANTAR FASCIAL FIBROMATOSIS: Chronic | Status: ACTIVE | Noted: 2021-04-01

## 2021-04-06 PROBLEM — N28.1 CYST OF KIDNEY, ACQUIRED: Chronic | Status: ACTIVE | Noted: 2021-04-01

## 2021-04-06 PROBLEM — L03.90 CELLULITIS, UNSPECIFIED: Chronic | Status: ACTIVE | Noted: 2021-04-01

## 2021-04-06 PROBLEM — Z86.19 PERSONAL HISTORY OF OTHER INFECTIOUS AND PARASITIC DISEASES: Chronic | Status: ACTIVE | Noted: 2021-04-01

## 2021-04-06 PROBLEM — M54.16 RADICULOPATHY, LUMBAR REGION: Chronic | Status: ACTIVE | Noted: 2021-04-01

## 2021-04-06 PROBLEM — D49.4 NEOPLASM OF UNSPECIFIED BEHAVIOR OF BLADDER: Chronic | Status: ACTIVE | Noted: 2021-04-01

## 2021-04-06 PROBLEM — H26.9 UNSPECIFIED CATARACT: Chronic | Status: ACTIVE | Noted: 2021-04-01

## 2021-04-06 PROBLEM — U07.1 COVID-19: Chronic | Status: ACTIVE | Noted: 2021-04-01

## 2021-04-06 PROBLEM — K64.9 UNSPECIFIED HEMORRHOIDS: Chronic | Status: ACTIVE | Noted: 2021-04-01

## 2021-04-06 PROBLEM — E11.9 TYPE 2 DIABETES MELLITUS WITHOUT COMPLICATIONS: Chronic | Status: ACTIVE | Noted: 2021-04-01

## 2021-04-06 PROBLEM — G56.00 CARPAL TUNNEL SYNDROME, UNSPECIFIED UPPER LIMB: Chronic | Status: ACTIVE | Noted: 2021-04-01

## 2021-04-06 LAB
BILIRUB UR QL STRIP: NEGATIVE
CLARITY UR: CLEAR
COLLECTION METHOD: NORMAL
GLUCOSE UR-MCNC: NEGATIVE
HCG UR QL: 1 EU/DL
HGB UR QL STRIP.AUTO: NORMAL
KETONES UR-MCNC: NEGATIVE
LEUKOCYTE ESTERASE UR QL STRIP: NORMAL
NITRITE UR QL STRIP: NEGATIVE
PH UR STRIP: 7
PROT UR STRIP-MCNC: NEGATIVE
SP GR UR STRIP: 1.02

## 2021-04-06 PROCEDURE — 81003 URINALYSIS AUTO W/O SCOPE: CPT | Mod: QW

## 2021-04-06 PROCEDURE — 99203 OFFICE O/P NEW LOW 30 MIN: CPT

## 2021-04-06 PROCEDURE — 99213 OFFICE O/P EST LOW 20 MIN: CPT | Mod: 25

## 2021-04-06 RX ORDER — ALBUTEROL SULFATE 90 UG/1
108 (90 BASE) INHALANT RESPIRATORY (INHALATION)
Qty: 1 | Refills: 3 | Status: DISCONTINUED | COMMUNITY
Start: 2018-01-12 | End: 2021-04-06

## 2021-04-06 NOTE — ASSESSMENT
[FreeTextEntry1] : 65-year-old female with Crohn's disease increasing gastrointestinal bleeding incurred. Recommend colonoscopy Recommend colonoscopy. Risks and benefits of colonoscopy have been discussed which include but not limited to bleeding, perforation, missing a cancer or polyp occurring 5%.\par  EGD

## 2021-04-06 NOTE — PHYSICAL EXAM
[Normal] : no acute distress, well nourished, well developed and well-appearing [Suprapubic] : in the suprapubic area [Right] : right CVA tenderness present

## 2021-04-06 NOTE — HISTORY OF PRESENT ILLNESS
[FreeTextEntry1] : Consultation from Dr. Reyes for Crohn's disease, gastrointestinal bleeding and GERD. 65-year-old female with long-standing history of Crohn's disease with increasing bleeding abdominal pain and GERD,Symptoms have been worsening over the last 6-8 months

## 2021-04-06 NOTE — PHYSICAL EXAM
[Abdomen Masses] : No abdominal masses [Abdomen Tenderness] : ~T ~M Abdominal tenderness [Tender] : nontender [JVD] : no jugular venous distention  [Normal Breath Sounds] : Normal breath sounds [Normal Heart Sounds] : normal heart sounds [Normal Rate and Rhythm] : normal rate and rhythm [No Rash or Lesion] : No rash or lesion [Alert] : alert [Oriented to Person] : oriented to person [Oriented to Place] : oriented to place [Oriented to Time] : oriented to time [Calm] : calm [de-identified] : Looks well in no distress, of stated age. [de-identified] : pupils equal reactive to light normocephalic atraumatic. [de-identified] : moves all 4 extremities appropriately with 5 over 5 strength

## 2021-04-06 NOTE — HISTORY OF PRESENT ILLNESS
[FreeTextEntry8] : Chills, sweats, burning, urgency and frequency for past three days.\par Last on antibiotics for UTI in March.\par Chronic back pain. \par Procedure, cystoscopy scheduled for next week.\par Colonoscopy later today.\par

## 2021-04-06 NOTE — REVIEW OF SYSTEMS
[Feeling Poorly] : feeling poorly [Feeling Tired] : feeling tired [As Noted in HPI] : as noted in HPI [Abdominal Pain] : abdominal pain [Diarrhea] : diarrhea [Heartburn] : heartburn [Melena] : melericka [Negative] : Heme/Lymph

## 2021-04-06 NOTE — CONSULT LETTER
[Dear  ___] : Dear  [unfilled], [Consult Letter:] : I had the pleasure of evaluating your patient, [unfilled]. [Please see my note below.] : Please see my note below. [Consult Closing:] : Thank you very much for allowing me to participate in the care of this patient.  If you have any questions, please do not hesitate to contact me. [Sincerely,] : Sincerely, [FreeTextEntry3] : Neftali Bashir M.D. FACS, FASCRS

## 2021-04-08 ENCOUNTER — APPOINTMENT (OUTPATIENT)
Dept: FAMILY MEDICINE | Facility: CLINIC | Age: 66
End: 2021-04-08
Payer: MEDICARE

## 2021-04-08 VITALS
HEIGHT: 64 IN | OXYGEN SATURATION: 97 % | HEART RATE: 85 BPM | SYSTOLIC BLOOD PRESSURE: 118 MMHG | DIASTOLIC BLOOD PRESSURE: 76 MMHG | WEIGHT: 210 LBS | BODY MASS INDEX: 35.85 KG/M2

## 2021-04-08 LAB — BACTERIA UR CULT: NORMAL

## 2021-04-08 PROCEDURE — 99214 OFFICE O/P EST MOD 30 MIN: CPT

## 2021-04-08 RX ORDER — METHYLPREDNISOLONE 4 MG/1
4 TABLET ORAL
Qty: 1 | Refills: 0 | Status: COMPLETED | COMMUNITY
Start: 2021-01-20 | End: 2021-04-08

## 2021-04-08 RX ORDER — RIVAROXABAN 10 MG/1
10 TABLET, FILM COATED ORAL
Refills: 0 | Status: COMPLETED | COMMUNITY
End: 2021-04-08

## 2021-04-08 NOTE — RESULTS/DATA
[] : results reviewed [de-identified] : 10.8 chronic anemia stable [de-identified] : EKG- NSR 96 bpm no st t changes [de-identified] : urine cx neg\par a1c 6.3

## 2021-04-08 NOTE — PHYSICAL EXAM
[Normal Oropharynx] : the oropharynx was normal [Soft] : abdomen soft [Non Tender] : non-tender [Non-distended] : non-distended [No Rash] : no rash [Normal] : affect was normal and insight and judgment were intact [de-identified] : +lumbar back tenderness

## 2021-04-08 NOTE — HISTORY OF PRESENT ILLNESS
[No Pertinent Cardiac History] : no history of aortic stenosis, atrial fibrillation, coronary artery disease, recent myocardial infarction, or implantable device/pacemaker [Asthma] : asthma [No Adverse Anesthesia Reaction] : no adverse anesthesia reaction in self or family member [Diabetes] : diabetes [(Patient denies any chest pain, claudication, dyspnea on exertion, orthopnea, palpitations or syncope)] : Patient denies any chest pain, claudication, dyspnea on exertion, orthopnea, palpitations or syncope [Chronic Anticoagulation] : no chronic anticoagulation [Chronic Kidney Disease] : no chronic kidney disease [Moderate (4-6 METs)] : Moderate (4-6 METs) [FreeTextEntry1] : cystoscopy [FreeTextEntry2] : 4/12/21 [FreeTextEntry4] : Pt in office for medical clearance. Pt to go for procedure for gross hematuria. Pt denies chest pain, palpitations, dyspnea, fever, chills, nausea, or vomiting. Pt has hx of focal seizures, pt recently started on oxcarbazepine which she has tolerated well. Pt has hx of asthma, uses singulair qhs and proair prn. Pt has hx of DM, pt on januvia, glimepiride, metformin.  [FreeTextEntry3] : Abhijit [FreeTextEntry7] : Nuclear stress test 09/2020 breast and diaphragm attenuation. Small, mild defect in anterior wall that is reversible suggestive of mild ischemia, EF 66%\par TTE 04/2018 normal LV function, mild DD, mild PI

## 2021-04-08 NOTE — ASSESSMENT
[Patient Optimized for Surgery] : Patient optimized for surgery [FreeTextEntry4] : Pt is medically optimized for procedure at this time.\par Pt to continue oxcarbazepine. Neurology has also recommended considering administration lorazepam 1mg IV at the end of surgery to decrease risk of seizure when anesthesia is wearing off. Asthma stable, monitor for bronchospasm perioperatively, cont meds.\par Pt to hold glimepiride the morning of surgery. Pt will continue metformin and Januvia day of procedure.

## 2021-04-09 LAB — SARS-COV-2 N GENE NPH QL NAA+PROBE: NOT DETECTED

## 2021-04-12 ENCOUNTER — RESULT REVIEW (OUTPATIENT)
Age: 66
End: 2021-04-12

## 2021-04-12 ENCOUNTER — OUTPATIENT (OUTPATIENT)
Dept: INPATIENT UNIT | Facility: HOSPITAL | Age: 66
LOS: 1 days | Discharge: HOME | End: 2021-04-12
Payer: MEDICARE

## 2021-04-12 ENCOUNTER — APPOINTMENT (OUTPATIENT)
Dept: UROLOGY | Facility: HOSPITAL | Age: 66
End: 2021-04-12

## 2021-04-12 VITALS
SYSTOLIC BLOOD PRESSURE: 134 MMHG | TEMPERATURE: 98 F | WEIGHT: 214.95 LBS | HEART RATE: 81 BPM | OXYGEN SATURATION: 98 % | DIASTOLIC BLOOD PRESSURE: 79 MMHG | RESPIRATION RATE: 16 BRPM | HEIGHT: 64 IN

## 2021-04-12 VITALS
RESPIRATION RATE: 16 BRPM | SYSTOLIC BLOOD PRESSURE: 118 MMHG | DIASTOLIC BLOOD PRESSURE: 73 MMHG | OXYGEN SATURATION: 98 % | HEART RATE: 81 BPM | TEMPERATURE: 98 F

## 2021-04-12 DIAGNOSIS — F41.8 OTHER SPECIFIED ANXIETY DISORDERS: ICD-10-CM

## 2021-04-12 DIAGNOSIS — N32.9 BLADDER DISORDER, UNSPECIFIED: ICD-10-CM

## 2021-04-12 DIAGNOSIS — Z90.49 ACQUIRED ABSENCE OF OTHER SPECIFIED PARTS OF DIGESTIVE TRACT: Chronic | ICD-10-CM

## 2021-04-12 DIAGNOSIS — M75.120 COMPLETE ROTATOR CUFF TEAR OR RUPTURE OF UNSPECIFIED SHOULDER, NOT SPECIFIED AS TRAUMATIC: Chronic | ICD-10-CM

## 2021-04-12 DIAGNOSIS — Z98.89 OTHER SPECIFIED POSTPROCEDURAL STATES: Chronic | ICD-10-CM

## 2021-04-12 DIAGNOSIS — E03.9 HYPOTHYROIDISM, UNSPECIFIED: ICD-10-CM

## 2021-04-12 DIAGNOSIS — Z41.1 ENCOUNTER FOR COSMETIC SURGERY: Chronic | ICD-10-CM

## 2021-04-12 DIAGNOSIS — J45.909 UNSPECIFIED ASTHMA, UNCOMPLICATED: ICD-10-CM

## 2021-04-12 DIAGNOSIS — E11.9 TYPE 2 DIABETES MELLITUS WITHOUT COMPLICATIONS: ICD-10-CM

## 2021-04-12 DIAGNOSIS — Z79.891 LONG TERM (CURRENT) USE OF OPIATE ANALGESIC: ICD-10-CM

## 2021-04-12 DIAGNOSIS — K21.9 GASTRO-ESOPHAGEAL REFLUX DISEASE WITHOUT ESOPHAGITIS: ICD-10-CM

## 2021-04-12 DIAGNOSIS — Z96.653 PRESENCE OF ARTIFICIAL KNEE JOINT, BILATERAL: ICD-10-CM

## 2021-04-12 DIAGNOSIS — L02.92 FURUNCLE, UNSPECIFIED: Chronic | ICD-10-CM

## 2021-04-12 DIAGNOSIS — E66.9 OBESITY, UNSPECIFIED: ICD-10-CM

## 2021-04-12 DIAGNOSIS — M06.9 RHEUMATOID ARTHRITIS, UNSPECIFIED: ICD-10-CM

## 2021-04-12 DIAGNOSIS — G47.33 OBSTRUCTIVE SLEEP APNEA (ADULT) (PEDIATRIC): ICD-10-CM

## 2021-04-12 DIAGNOSIS — R31.0 GROSS HEMATURIA: ICD-10-CM

## 2021-04-12 DIAGNOSIS — Z79.84 LONG TERM (CURRENT) USE OF ORAL HYPOGLYCEMIC DRUGS: ICD-10-CM

## 2021-04-12 DIAGNOSIS — N30.20 OTHER CHRONIC CYSTITIS WITHOUT HEMATURIA: ICD-10-CM

## 2021-04-12 DIAGNOSIS — Z88.5 ALLERGY STATUS TO NARCOTIC AGENT: ICD-10-CM

## 2021-04-12 DIAGNOSIS — M51.26 OTHER INTERVERTEBRAL DISC DISPLACEMENT, LUMBAR REGION: ICD-10-CM

## 2021-04-12 DIAGNOSIS — Z86.16 PERSONAL HISTORY OF COVID-19: ICD-10-CM

## 2021-04-12 DIAGNOSIS — Z98.84 BARIATRIC SURGERY STATUS: Chronic | ICD-10-CM

## 2021-04-12 DIAGNOSIS — K46.9 UNSPECIFIED ABDOMINAL HERNIA WITHOUT OBSTRUCTION OR GANGRENE: Chronic | ICD-10-CM

## 2021-04-12 DIAGNOSIS — Z96.653 PRESENCE OF ARTIFICIAL KNEE JOINT, BILATERAL: Chronic | ICD-10-CM

## 2021-04-12 PROCEDURE — 88305 TISSUE EXAM BY PATHOLOGIST: CPT | Mod: 26

## 2021-04-12 PROCEDURE — 88305 TISSUE EXAM BY PATHOLOGIST: CPT

## 2021-04-12 PROCEDURE — 52235 CYSTOSCOPY AND TREATMENT: CPT

## 2021-04-12 RX ORDER — OXYCODONE HYDROCHLORIDE 5 MG/1
10 TABLET ORAL ONCE
Refills: 0 | Status: DISCONTINUED | OUTPATIENT
Start: 2021-04-12 | End: 2021-04-12

## 2021-04-12 RX ORDER — PHENAZOPYRIDINE HCL 100 MG
1 TABLET ORAL
Qty: 9 | Refills: 0
Start: 2021-04-12 | End: 2021-04-14

## 2021-04-12 RX ORDER — FAMOTIDINE 10 MG/ML
20 INJECTION INTRAVENOUS ONCE
Refills: 0 | Status: COMPLETED | OUTPATIENT
Start: 2021-04-12 | End: 2021-04-12

## 2021-04-12 RX ORDER — CELECOXIB 200 MG/1
200 CAPSULE ORAL ONCE
Refills: 0 | Status: COMPLETED | OUTPATIENT
Start: 2021-04-12 | End: 2021-04-12

## 2021-04-12 RX ORDER — ZINC SULFATE TAB 220 MG (50 MG ZINC EQUIVALENT) 220 (50 ZN) MG
0 TAB ORAL
Qty: 0 | Refills: 0 | DISCHARGE

## 2021-04-12 RX ORDER — PHENAZOPYRIDINE HCL 100 MG
200 TABLET ORAL ONCE
Refills: 0 | Status: COMPLETED | OUTPATIENT
Start: 2021-04-12 | End: 2021-04-12

## 2021-04-12 RX ORDER — ONDANSETRON 8 MG/1
4 TABLET, FILM COATED ORAL ONCE
Refills: 0 | Status: DISCONTINUED | OUTPATIENT
Start: 2021-04-12 | End: 2021-04-12

## 2021-04-12 RX ORDER — SODIUM CHLORIDE 9 MG/ML
1000 INJECTION INTRAMUSCULAR; INTRAVENOUS; SUBCUTANEOUS
Refills: 0 | Status: DISCONTINUED | OUTPATIENT
Start: 2021-04-12 | End: 2021-04-12

## 2021-04-12 RX ORDER — CEFUROXIME AXETIL 250 MG
1 TABLET ORAL
Qty: 6 | Refills: 0
Start: 2021-04-12 | End: 2021-04-14

## 2021-04-12 RX ORDER — ACETAMINOPHEN 500 MG
975 TABLET ORAL ONCE
Refills: 0 | Status: COMPLETED | OUTPATIENT
Start: 2021-04-12 | End: 2021-04-12

## 2021-04-12 RX ORDER — OXYCODONE HYDROCHLORIDE 5 MG/1
5 TABLET ORAL ONCE
Refills: 0 | Status: DISCONTINUED | OUTPATIENT
Start: 2021-04-12 | End: 2021-04-12

## 2021-04-12 RX ADMIN — Medication 975 MILLIGRAM(S): at 08:58

## 2021-04-12 RX ADMIN — SODIUM CHLORIDE 100 MILLILITER(S): 9 INJECTION INTRAMUSCULAR; INTRAVENOUS; SUBCUTANEOUS at 11:38

## 2021-04-12 RX ADMIN — OXYCODONE HYDROCHLORIDE 10 MILLIGRAM(S): 5 TABLET ORAL at 11:37

## 2021-04-12 RX ADMIN — CELECOXIB 200 MILLIGRAM(S): 200 CAPSULE ORAL at 08:58

## 2021-04-12 RX ADMIN — Medication 200 MILLIGRAM(S): at 12:34

## 2021-04-12 RX ADMIN — FAMOTIDINE 20 MILLIGRAM(S): 10 INJECTION INTRAVENOUS at 08:58

## 2021-04-12 NOTE — ASU PATIENT PROFILE, ADULT - PSH
Abdominal hernia  5 times  Boils  left arm, sweat gland removed  Complete rotator cuff tear  right x1 left x2  Gastric bypass status for obesity  15 years ago and revision 1 year later  H/O rhinoplasty  1975  H/O vein stripping    History of back surgery  placement 2 rods and 6 screws 2013 2020  History of carpal tunnel release    History of cholecystectomy    History of nasal surgery  fractured 2 times repaired  History of tonsillectomy    S/P total knee arthroplasty, bilateral  left x1 right x2

## 2021-04-12 NOTE — ASU PATIENT PROFILE, ADULT - NS PRO ABUSE SCREEN AFRAID ANYONE YN
Take the following medications the morning of surgery with a small sip of water: NONE    ARRIVE AT 10 AM    General Instructions:  • Do not eat or drink anything after midnight the night before surgery.  • Infants may have breast milk up to four hours before surgery.  • Infants drinking formula may drink formula up to six hours before surgery.   • Patients who avoid smoking, chewing tobacco and alcohol for 4 weeks prior to surgery have a reduced risk of post-operative complications.  Quit smoking as many days before surgery as you can.  • Do not smoke, use chewing tobacco or drink alcohol the day of surgery.   • If applicable bring your C-PAP/ BI-PAP machine.  • Bring any papers given to you in the doctor’s office.  • Wear clean comfortable clothes and socks.  • Do not wear contact lenses or make-up.  Bring a case for your glasses.   • Bring crutches or walker if applicable.  • Remove all piercings.  Leave jewelry and any other valuables at home.  • Hair extensions with metal clips must be removed prior to surgery.  • The Pre-Admission Testing nurse will instruct you to bring medications if unable to obtain an accurate list in Pre-Admission Testing.        Preventing a Surgical Site Infection:  • For 2 to 3 days before surgery, avoid shaving with a razor because the razor can irritate skin and make it easier to develop an infection.  • The night prior to surgery sleep in a clean bed with clean clothing.  Do not allow pets to sleep with you.  • Shower on the morning of surgery using a fresh bar of anti-bacterial soap (such as Dial) and clean washcloth.  Dry with a clean towel and dress in clean clothing.  • Ask your surgeon if you will be receiving antibiotics prior to surgery.  • Make sure you, your family, and all healthcare providers clean their hands with soap and water or an alcohol based hand  before caring for you or your wound.    Day of surgery:  Upon arrival, a Pre-op nurse and Anesthesiologist  will review your health history, obtain vital signs, and answer questions you may have.  The only belongings needed at this time will be your home medications and if applicable your C-PAP/BI-PAP machine.  If you are staying overnight your family can leave the rest of your belongings in the car and bring them to your room later.  A Pre-op nurse will start an IV and you may receive medication in preparation for surgery, including something to help you relax.  Your family will be able to see you in the Pre-op area.  While you are in surgery your family should notify the waiting room  if they leave the waiting room area and provide a contact phone number.    Please be aware that surgery does come with discomfort.  We want to make every effort to control your discomfort so please discuss any uncontrolled symptoms with your nurse.   Your doctor will most likely have prescribed pain medications.      If you are going home after surgery you will receive individualized written care instructions before being discharged.  A responsible adult must drive you to and from the hospital on the day of your surgery and stay with you for 24 hours.    If you are staying overnight following surgery, you will be transported to your hospital room following the recovery period.  [facility] has all private rooms.    If you have any questions please call Pre-Admission Testing at [phone].  Deductibles and co-payments are collected on the day of service. Please be prepared to pay the required co-pay, deductible or deposit on the day of service as defined by your plan.           no

## 2021-04-12 NOTE — BRIEF OPERATIVE NOTE - NSICDXBRIEFPROCEDURE_GEN_ALL_CORE_FT
PROCEDURES:  Cystoscopy, with lesion fulguration, 2.0 cm to 5.0 cm in diameter 12-Apr-2021 11:55:16  Parveen Lacey S

## 2021-04-12 NOTE — BRIEF OPERATIVE NOTE - NSICDXBRIEFPREOP_GEN_ALL_CORE_FT
PRE-OP DIAGNOSIS:  Gross hematuria 12-Apr-2021 11:54:23  Parveen Lacey  Lesion of bladder 12-Apr-2021 11:54:37  Parveen Lacey

## 2021-04-12 NOTE — ASU PATIENT PROFILE, ADULT - MEDICATION HERBAL REMEDIES, PROFILE
"Calling for COVID test results.      Coronavirus (COVID-19) Notification    Caller Name (Patient, parent, daughter/son, grandparent, etc)  Parent: Nivia Pires    Reason for call  Notify of Positive Coronavirus (COVID-19) lab results, assess symptoms,  review  [facility] recommendations    Lab Result    Lab test:  2019-nCoV rRt-PCR or SARS-CoV-2 PCR    Oropharyngeal AND/OR nasopharyngeal swabs is POSITIVE for 2019-nCoV RNA/SARS-COV-2 PCR (COVID-19 virus)    RN Recommendations/Instructions per [facility] Coronavirus COVID-19 recommendations    Brief introduction script  Introduce self then review script:  \"I am calling on behalf of [employer].  We were notified that your Coronavirus test (COVID-19) for was POSITIVE for the virus.  I have some information to relay to you but first I wanted to mention that the [place] Dept of Health will be contacting you shortly [it's possible MD already called Patient] to talk to you more about how you are feeling and other people you have had contact with who might now also have the virus.  Also,  [facility] is Partnering with the [facility] for Covid-19 research, you may be contacted directly by research staff.\"    Assessment (Inquire about Patient's current symptoms)   Assessment   Current Symptoms at time of phone call: (if no symptoms, document No symptoms] None    Symptoms onset (if applicable) 9/21     If at time of call, Patients symptoms hare worsened, the Patient should contact 911 or have someone drive them to Emergency Dept promptly:      If Patient calling 911, inform 911 personal that you have tested positive for the Coronavirus (COVID-19).  Place mask on and await 911 to arrive.    If Emergency Dept, If possible, please have another adult drive you to the Emergency Dept but you need to wear mask when in contact with other people.      Review information with Patient    Your result was positive. This means you have COVID-19 (coronavirus).  We " have sent you a letter that reviews the information that I'll be reviewing with you now.    How can I protect others?    If you have symptoms: stay home and away from others (self-isolate) until:    You've had no fever--and no medicine that reduces fever--for 1 full day (24 hours). And       Your other symptoms have gotten better. For example, your cough or breathing has improved. And     At least 10 days have passed since your symptoms started. (If you've been told by a doctor that you have a weak immune system, wait 20 days.)     If you don't have symptoms: Stay home and away from others (self-isolate) until at least 10 days have passed since your first positive COVID-19 test. (Date test collected)    During this time:    Stay in your own room, including for meals. Use your own bathroom if you can.    Stay away from others in your home. No hugging, kissing or shaking hands. No visitors.     Don't go to work, school or anywhere else.     Clean  high touch  surfaces often (doorknobs, counters, handles, etc.). Use a household cleaning spray or wipes. You'll find a full list on the EPA website at www.epa.gov/pesticide-registration/list-n-disinfectants-use-against-sars-cov-2.     Cover your mouth and nose with a mask, tissue or other face covering to avoid spreading germs.    Wash your hands and face often with soap and water.    Caregivers in these groups are at risk for severe illness due to COVID-19:  o People 65 years and older  o People who live in a nursing home or long-term care facility  o People with chronic disease (lung, heart, cancer, diabetes, kidney, liver, immunologic)  o People who have a weakened immune system, including those who:  - Are in cancer treatment  - Take medicine that weakens the immune system, such as corticosteroids  - Had a bone marrow or organ transplant  - Have an immune deficiency  - Have poorly controlled HIV or AIDS  - Are obese (body mass index of 40 or higher)  - Smoke  regularly    Caregivers should wear gloves while washing dishes, handling laundry and cleaning bedrooms and bathrooms.    Wash and dry laundry with special caution. Don't shake dirty laundry, and use the warmest water setting you can.    If you have a weakened immune system, ask your doctor about other actions you should take.    For more tips, go to www.cdc.gov/coronavirus/2019-ncov/downloads/10Things.pdf.    You should not go back to work until you meet the guidelines above for ending your home isolation. You don't need to be retested for COVID-19 before going back to work--studies show that you won't spread the virus if it's been at least 10 days since your symptoms started (or 20 days, if you have a weak immune system).    Employers: This document serves as formal notice of your employee's medical guidelines for going back to work. They must meet the above guidelines before going back to work in person.    How can I take care of myself?    1. Get lots of rest. Drink extra fluids (unless a doctor has told you not to).    2. Take Tylenol (acetaminophen) for fever or pain. If you have liver or kidney problems, ask your family doctor if it's okay to take Tylenol.     Take either:     650 mg (two 325 mg pills) every 4 to 6 hours, or     1,000 mg (two 500 mg pills) every 8 hours as needed.     Note: Don't take more than 3,000 mg in one day. Acetaminophen is found in many medicines (both prescribed and over-the-counter medicines). Read all labels to be sure you don't take too much.    For children, check the Tylenol bottle for the right dose (based on their age or weight).    3. If you have other health problems (like cancer, heart failure, an organ transplant or severe kidney disease): Call your specialty clinic if you don't feel better in the next 2 days.    4. Know when to call 911: Emergency warning signs include:    Trouble breathing or shortness of breath    Pain or pressure in the chest that doesn't go  away    Feeling confused like you haven't felt before, or not being able to wake up    Bluish-colored lips or face    5. Sign up for [employer]. We know it's scary to hear that you have COVID-19. We want to track your symptoms to make sure you're okay over the next 2 weeks. Please look for an email from [employer]--this is a free, online program that we'll use to keep in touch. To sign up, follow the link in the email. Learn more at [email or web address].    Where can I get more information?    [facility]: [email or web address]    Coronavirus Basics: www.health.[facility].[place]./diseases/coronavirus/basics.html    What to Do If You're Sick: www.cdc.gov/coronavirus/2019-ncov/about/steps-when-sick.html    Ending Home Isolation: www.cdc.gov/coronavirus/2019-ncov/hcp/disposition-in-home-patients.html     Caring for Someone with COVID-19: www.cdc.gov/coronavirus/2019-ncov/if-you-are-sick/care-for-someone.html     [facility] clinical trials (COVID-19 research studies): [email or web address].[email or web address]/n-clinical-trials     A Positive COVID-19 letter will be sent via [employer] or the mail. (Exception, no letters sent to Presurgerical/Preprocedure Patients)    Brie Tapia RN Triage Nurse Advisor      no

## 2021-04-12 NOTE — ASU PATIENT PROFILE, ADULT - PMH
Anemia    Anxiety and depression    Asthma    Bladder tumor    Cardiac murmur    Carotid stenosis    Carpal tunnel syndrome    Cataract    Cellulitis    Colitis    COVID-19  2/2021  Crohns disease    Degenerative disc disease, lumbar    Diabetes mellitus    DJD (degenerative joint disease)  B/L Knee Replacement  Dry eyes    Exostosis  right middle finger  GERD (gastroesophageal reflux disease)    H/O cervical fracture    H/O fracture of humerus    H/O herpes zoster    Headache    Hemorrhoid    Hypotension    Hypothyroid    Lumbar radiculopathy    Morbid obesity  S/P Gastric By-Pass surgery  Muscle spasm of both lower legs    Obese    Panic attacks    Plantar fasciitis    PUD (peptic ulcer disease)    PUD (peptic ulcer disease)    Reflux    Renal calculi    Renal cyst    Rheumatoid arthritis    Seasonal allergic rhinitis    Seizure    Sleep apnea

## 2021-04-12 NOTE — BRIEF OPERATIVE NOTE - NSICDXBRIEFPOSTOP_GEN_ALL_CORE_FT
POST-OP DIAGNOSIS:  Lesion of bladder 12-Apr-2021 11:54:49  Parveen Lacey  Gross hematuria 12-Apr-2021 11:54:44  Parveen Lacey

## 2021-04-12 NOTE — BRIEF OPERATIVE NOTE - OPERATION/FINDINGS
1. 3 cm erythematous mucosa left superior posterolateral bladder wall  2. Methylene blue fluid placed in the bladder, no staining of vaginal and rectal sponge

## 2021-04-19 ENCOUNTER — APPOINTMENT (OUTPATIENT)
Dept: CARDIOLOGY | Facility: CLINIC | Age: 66
End: 2021-04-19
Payer: MEDICARE

## 2021-04-19 ENCOUNTER — APPOINTMENT (OUTPATIENT)
Dept: CARDIOLOGY | Facility: CLINIC | Age: 66
End: 2021-04-19

## 2021-04-19 ENCOUNTER — NON-APPOINTMENT (OUTPATIENT)
Age: 66
End: 2021-04-19

## 2021-04-19 VITALS
DIASTOLIC BLOOD PRESSURE: 70 MMHG | HEART RATE: 99 BPM | BODY MASS INDEX: 35.85 KG/M2 | WEIGHT: 210 LBS | HEIGHT: 64 IN | SYSTOLIC BLOOD PRESSURE: 118 MMHG

## 2021-04-19 PROCEDURE — 93000 ELECTROCARDIOGRAM COMPLETE: CPT

## 2021-04-19 PROCEDURE — 99214 OFFICE O/P EST MOD 30 MIN: CPT | Mod: 25

## 2021-04-19 PROCEDURE — 93306 TTE W/DOPPLER COMPLETE: CPT

## 2021-04-19 NOTE — HISTORY OF PRESENT ILLNESS
[FreeTextEntry1] : Pt is a 64 y/o F who presents today for f/u.  Pt hospitalized for COVID 01/2021 at [facility] - she states that she was told "it affected the heart" - I do not have records.  She was discharged on Xarelto 10mg but she never took it.  She is unsure exactly what they told her.  Today she states that for the past several weeks she has been getting palpitations - mostly at night\par \par TTE 04/2018 normal LV function, mild DD, mild PI\par TTE 04/2021 normal LV function without significant valvular pathology\par Nuclear stress test 09/2020 breast and diaphragm attenuation.  Small, mild defect in anterior wall that is reversible suggestive of mild ischemia, EF 66%\par \par PMH: DM, hypothyroidism, asthma, nephrolithiasis, gastric bypass\par Smoking status: quit 16 yrs ago\par Current exercise: none\par Daily water intake: 32 oz\par Daily caffeine intake: 1 cup coffee\par OTC medications: see list\par Family hx: mother and father stroke, mother PPM, father ?MI at age 70\par Previous cardiac testing: stress test many yrs ago "normal"\par Previous hospitalizations: knee and back surgery\par

## 2021-04-19 NOTE — DISCUSSION/SUMMARY
[FreeTextEntry1] : Pt is a 64 y/o F who presents today for f/u - recently hospitalized for COVID now with palpitations\par Given pt's symptoms will check jarrett monitor to assess for ectopy.  Advised adequate hydration.  Drug use, OTC medication use, caffeine consumption reviewed \par Will get records - ?d/c'ed on xarelto for COVID\par VSS\par Pt will return in 4 mnths or sooner as needed but I encouraged communication via phone or portal if necessary. \par The described plan was discussed with the pt.  All questions and concerns were addressed to the best of my knowledge.\par

## 2021-04-20 ENCOUNTER — APPOINTMENT (OUTPATIENT)
Dept: UROLOGY | Facility: CLINIC | Age: 66
End: 2021-04-20
Payer: MEDICARE

## 2021-04-20 VITALS — TEMPERATURE: 97.2 F

## 2021-04-20 PROCEDURE — 99213 OFFICE O/P EST LOW 20 MIN: CPT

## 2021-04-23 ENCOUNTER — APPOINTMENT (OUTPATIENT)
Dept: FAMILY MEDICINE | Facility: CLINIC | Age: 66
End: 2021-04-23
Payer: MEDICARE

## 2021-04-23 VITALS
HEIGHT: 64 IN | HEART RATE: 95 BPM | DIASTOLIC BLOOD PRESSURE: 80 MMHG | SYSTOLIC BLOOD PRESSURE: 120 MMHG | RESPIRATION RATE: 16 BRPM | WEIGHT: 210 LBS | BODY MASS INDEX: 35.85 KG/M2 | TEMPERATURE: 97.8 F | OXYGEN SATURATION: 97 %

## 2021-04-23 DIAGNOSIS — L23.7 ALLERGIC CONTACT DERMATITIS DUE TO PLANTS, EXCEPT FOOD: ICD-10-CM

## 2021-04-23 PROCEDURE — 99213 OFFICE O/P EST LOW 20 MIN: CPT

## 2021-04-23 NOTE — HISTORY OF PRESENT ILLNESS
[FreeTextEntry8] : PResenting for posiion ivy rash x 1 week\par initially was on hands has now spread to arms, legs, fingers, face\par very itchy\par not painful\par no pain or tinglings\par trying to avoid spreading it to her family\par \par has pulling weeds without gloved because she did not realize it was poison ivy.

## 2021-04-23 NOTE — PLAN
[FreeTextEntry1] : widespread poision ivy\par use calamine lotion oatmeal baths\par given oral steriods for 5 days\par use steriod cream on very itchy areas\par discussed spreading and prevention of posion ivy

## 2021-04-23 NOTE — PHYSICAL EXAM
[Normal] : no acute distress, well nourished, well developed and well-appearing [de-identified] : red papular rash over bodymainly on arms and shoulders and hands

## 2021-04-26 NOTE — ASSESSMENT
[FreeTextEntry1] : Gross hematuria:\par Discussed Pathology. \par Recommended to follow with Gynecology to rule out Gynecologic issues. \par Has appointment with Gynecology in May. \par \par Return to office in 6 weeks or sooner if any issues.

## 2021-04-26 NOTE — PHYSICAL EXAM
[Normal Appearance] : normal appearance [General Appearance - In No Acute Distress] : no acute distress [Skin Color & Pigmentation] : normal skin color and pigmentation [FreeTextEntry1] : normal peripheral circulation [] : no respiratory distress [Oriented To Time, Place, And Person] : oriented to person, place, and time

## 2021-04-26 NOTE — HISTORY OF PRESENT ILLNESS
[FreeTextEntry1] : 66 yo female presents for follow up. \par Still has dysuria and gross hematuria.\par Status post Cystoscopy and bladder biopsy with fulguration done on 4/12/21 at [facility].\par \par Recently seen for gross hematuria. \par Had been having off and on gross hematuria for last 1 year, was being treated with Antibiotics. \par Last urine negative for infection but still had gross hematuria. \par Complained of off and on suprapubic pain and dysuria.  Has chronic back pain. \par Urinates every 3  hours or so during the day and night every 1-2 hours or so. \par Denied nausea, vomiting, fever, chills or rigors. \par Past smoker: < 1PPD for 20 years, quit 40 years ago.\par \par Initially seen for Ureteral stone.  \par Passed ureteral stone in 2018, did not follow up.\par Went to [facility] with severe Lt Flank pain, some nausea and vomiting. \par Had CT scan was told has kidney stone and sent home on Flomax, Pain meds. \par Did not see the stone pass. Pain has decreased but still needed pain meds off and on and was taking Flomax BID. Had chronic back pain, few weeks before the ER visit had been having back pain and passed 2 stones. No prior history of Kidney stone. \par Since ER visit had been urinating every hour or so with urgency. Saw some blood 2 days ago. \par \par

## 2021-05-07 ENCOUNTER — RX RENEWAL (OUTPATIENT)
Age: 66
End: 2021-05-07

## 2021-05-08 ENCOUNTER — APPOINTMENT (OUTPATIENT)
Dept: DISASTER EMERGENCY | Facility: CLINIC | Age: 66
End: 2021-05-08

## 2021-05-08 LAB — SARS-COV-2 N GENE NPH QL NAA+PROBE: NOT DETECTED

## 2021-05-11 ENCOUNTER — RESULT REVIEW (OUTPATIENT)
Age: 66
End: 2021-05-11

## 2021-05-11 ENCOUNTER — APPOINTMENT (OUTPATIENT)
Dept: GASTROENTEROLOGY | Facility: AMBULATORY MEDICAL SERVICES | Age: 66
End: 2021-05-11
Payer: MEDICARE

## 2021-05-11 ENCOUNTER — APPOINTMENT (OUTPATIENT)
Dept: COLORECTAL SURGERY | Facility: CLINIC | Age: 66
End: 2021-05-11
Payer: MEDICARE

## 2021-05-11 PROCEDURE — 43239 EGD BIOPSY SINGLE/MULTIPLE: CPT

## 2021-05-11 PROCEDURE — 45380 COLONOSCOPY AND BIOPSY: CPT

## 2021-05-12 ENCOUNTER — APPOINTMENT (OUTPATIENT)
Dept: RADIOLOGY | Facility: CLINIC | Age: 66
End: 2021-05-12
Payer: MEDICARE

## 2021-05-12 ENCOUNTER — OUTPATIENT (OUTPATIENT)
Dept: OUTPATIENT SERVICES | Facility: HOSPITAL | Age: 66
LOS: 1 days | End: 2021-05-12
Payer: MEDICARE

## 2021-05-12 DIAGNOSIS — Z98.89 OTHER SPECIFIED POSTPROCEDURAL STATES: Chronic | ICD-10-CM

## 2021-05-12 DIAGNOSIS — Z41.1 ENCOUNTER FOR COSMETIC SURGERY: Chronic | ICD-10-CM

## 2021-05-12 DIAGNOSIS — Z90.49 ACQUIRED ABSENCE OF OTHER SPECIFIED PARTS OF DIGESTIVE TRACT: Chronic | ICD-10-CM

## 2021-05-12 DIAGNOSIS — Z98.84 BARIATRIC SURGERY STATUS: Chronic | ICD-10-CM

## 2021-05-12 DIAGNOSIS — Z96.653 PRESENCE OF ARTIFICIAL KNEE JOINT, BILATERAL: Chronic | ICD-10-CM

## 2021-05-12 DIAGNOSIS — L02.92 FURUNCLE, UNSPECIFIED: Chronic | ICD-10-CM

## 2021-05-12 DIAGNOSIS — K46.9 UNSPECIFIED ABDOMINAL HERNIA WITHOUT OBSTRUCTION OR GANGRENE: Chronic | ICD-10-CM

## 2021-05-12 DIAGNOSIS — Z00.8 ENCOUNTER FOR OTHER GENERAL EXAMINATION: ICD-10-CM

## 2021-05-12 DIAGNOSIS — M75.120 COMPLETE ROTATOR CUFF TEAR OR RUPTURE OF UNSPECIFIED SHOULDER, NOT SPECIFIED AS TRAUMATIC: Chronic | ICD-10-CM

## 2021-05-12 PROCEDURE — 72100 X-RAY EXAM L-S SPINE 2/3 VWS: CPT

## 2021-05-12 PROCEDURE — 72100 X-RAY EXAM L-S SPINE 2/3 VWS: CPT | Mod: 26

## 2021-05-14 ENCOUNTER — APPOINTMENT (OUTPATIENT)
Dept: OBGYN | Facility: CLINIC | Age: 66
End: 2021-05-14
Payer: MEDICARE

## 2021-05-14 VITALS
BODY MASS INDEX: 37.05 KG/M2 | RESPIRATION RATE: 16 BRPM | DIASTOLIC BLOOD PRESSURE: 60 MMHG | SYSTOLIC BLOOD PRESSURE: 108 MMHG | HEIGHT: 64 IN | WEIGHT: 217 LBS

## 2021-05-14 PROCEDURE — 99214 OFFICE O/P EST MOD 30 MIN: CPT

## 2021-05-14 RX ORDER — NEBULIZER ACCESSORIES
KIT MISCELLANEOUS
Qty: 1 | Refills: 0 | Status: ACTIVE | COMMUNITY
Start: 2021-02-20

## 2021-05-15 NOTE — HISTORY OF PRESENT ILLNESS
[FreeTextEntry1] : 65 y  with PMB 2020 presents for evaluation. \par Dr Pichardo tried to do endometrial biopsy with Cytotec, but stenotic os made it unsuccessful.\par \par OB:\par  x 4\par \par Menopause about 54\par \par No HRT. No prior abnormal Pap tests nor h/o D&C polyps nor myomas.\par \par Pt had COVID in January and was hospitalized. \par For the past year, she had had bleeding. Dr Lacey performed cystoscopy and bladder fulguration. \par Dr Pichardo asked her to be seen after unsuccessful attempt to do endometrial biopsy in office. \par Colonoscopy and endoscoopy, which she tolerated well despite seizure history, were negative.\par \par No vaginal bleeding recently.\par \par \par \par \par \par \par  [Mammogramdate] : 11/30/20 [BoneDensityDate] : 2/1/19

## 2021-05-15 NOTE — DISCUSSION/SUMMARY
[FreeTextEntry1] : no vaginal bleeding, just stenotic cervical opening\par US Pelvis reviewed with 6 mm lining and no other findings\par Spoke to Dr Pichardo who tried to perform endometrial biopsy\par From reviewing the chart, the blood in urine was worked up and treated.\par Since then no vaginal bleeding has been noted.\par the likelihood that bleeding is coming from uterus is low due to stenosis\par I discussed the r/b/a to D&C hysteroscopy in the hospital including uterine perforation or inability to enter the cavity.\par Decision to repeat US to check ECHo or endometrial lining.\par Will d/w pt following US\par If any vaginal bleeding occurs, then I will schedule procedure.\par

## 2021-05-21 ENCOUNTER — TRANSCRIPTION ENCOUNTER (OUTPATIENT)
Age: 66
End: 2021-05-21

## 2021-05-22 ENCOUNTER — EMERGENCY (EMERGENCY)
Facility: HOSPITAL | Age: 66
LOS: 1 days | Discharge: HOME | End: 2021-05-22
Attending: EMERGENCY MEDICINE
Payer: MEDICARE

## 2021-05-22 VITALS
OXYGEN SATURATION: 97 % | WEIGHT: 214.95 LBS | RESPIRATION RATE: 16 BRPM | HEIGHT: 64 IN | HEART RATE: 76 BPM | SYSTOLIC BLOOD PRESSURE: 150 MMHG | DIASTOLIC BLOOD PRESSURE: 80 MMHG | TEMPERATURE: 98 F

## 2021-05-22 DIAGNOSIS — K46.9 UNSPECIFIED ABDOMINAL HERNIA WITHOUT OBSTRUCTION OR GANGRENE: Chronic | ICD-10-CM

## 2021-05-22 DIAGNOSIS — L02.92 FURUNCLE, UNSPECIFIED: Chronic | ICD-10-CM

## 2021-05-22 DIAGNOSIS — Z98.89 OTHER SPECIFIED POSTPROCEDURAL STATES: Chronic | ICD-10-CM

## 2021-05-22 DIAGNOSIS — Z90.49 ACQUIRED ABSENCE OF OTHER SPECIFIED PARTS OF DIGESTIVE TRACT: Chronic | ICD-10-CM

## 2021-05-22 DIAGNOSIS — Z96.653 PRESENCE OF ARTIFICIAL KNEE JOINT, BILATERAL: Chronic | ICD-10-CM

## 2021-05-22 DIAGNOSIS — M75.120 COMPLETE ROTATOR CUFF TEAR OR RUPTURE OF UNSPECIFIED SHOULDER, NOT SPECIFIED AS TRAUMATIC: Chronic | ICD-10-CM

## 2021-05-22 DIAGNOSIS — Z98.84 BARIATRIC SURGERY STATUS: Chronic | ICD-10-CM

## 2021-05-22 DIAGNOSIS — Z41.1 ENCOUNTER FOR COSMETIC SURGERY: Chronic | ICD-10-CM

## 2021-05-22 LAB — S PYO DNA THROAT QL NAA+PROBE: SIGNIFICANT CHANGE UP

## 2021-05-22 PROCEDURE — 87651 STREP A DNA AMP PROBE: CPT

## 2021-05-22 PROCEDURE — 87798 DETECT AGENT NOS DNA AMP: CPT

## 2021-05-22 PROCEDURE — 99284 EMERGENCY DEPT VISIT MOD MDM: CPT

## 2021-05-22 PROCEDURE — 96372 THER/PROPH/DIAG INJ SC/IM: CPT

## 2021-05-22 PROCEDURE — 99283 EMERGENCY DEPT VISIT LOW MDM: CPT | Mod: 25

## 2021-05-22 RX ORDER — DEXAMETHASONE 0.5 MG/5ML
8 ELIXIR ORAL ONCE
Refills: 0 | Status: COMPLETED | OUTPATIENT
Start: 2021-05-22 | End: 2021-05-22

## 2021-05-22 RX ADMIN — Medication 8 MILLIGRAM(S): at 13:28

## 2021-05-22 NOTE — ED ADULT TRIAGE NOTE - CHIEF COMPLAINT QUOTE
Pt states received 1st COVID vaccine last week and now c/o lumbar pain and SOB. Went to [facility] AND WAS SENT HERE FOR FURTHER EVAL.

## 2021-05-22 NOTE — ED PROVIDER NOTE - OBJECTIVE STATEMENT
66 y/o F with PMH of Crohn's disease c/o dull, aching, low back pain x 3 days.  Patient has hx of back pain but states that it's become worse over the past few days.  Denies trauma, bowel/bladder incontinence, saddle anesthesia or urinary symptoms.  Contrary to triage note, patient denies shortness of breath or chest pain.

## 2021-05-22 NOTE — ED PROVIDER NOTE - ATTENDING CONTRIBUTION TO CARE
64 y/o F with PMH of Crohn's disease c/o dull, aching, low back pain x 3 days.  Patient has hx of back pain but states that it's become worse over the past few days.  Denies trauma, bowel/bladder incontinence, saddle anesthesia or urinary symptoms.  Contrary to triage note, patient denies shortness of breath or chest pain.   no midline tenderness    decadron

## 2021-05-22 NOTE — ED PROVIDER NOTE - CARE PROVIDER_API CALL
Veronica Mays)  Orthopedics  [address] [place] [address], [address]  [place] [address]  Phone: [phone]  Fax: [phone]  Follow Up Time:

## 2021-05-22 NOTE — ED PROVIDER NOTE - PATIENT PORTAL LINK FT
You can access the FollowMyHealth Patient Portal offered by [facility] by registering at the following website: [email or web address]. By joining [facility] FollowMyHealth portal, you will also be able to view your health information using other applications (apps) compatible with our system.

## 2021-05-26 ENCOUNTER — APPOINTMENT (OUTPATIENT)
Dept: ORTHOPEDIC SURGERY | Facility: CLINIC | Age: 66
End: 2021-05-26
Payer: MEDICARE

## 2021-05-26 VITALS
TEMPERATURE: 98.1 F | DIASTOLIC BLOOD PRESSURE: 71 MMHG | WEIGHT: 217 LBS | HEIGHT: 64 IN | HEART RATE: 93 BPM | BODY MASS INDEX: 37.05 KG/M2 | SYSTOLIC BLOOD PRESSURE: 109 MMHG

## 2021-05-26 DIAGNOSIS — G89.29 LOW BACK PAIN: ICD-10-CM

## 2021-05-26 DIAGNOSIS — M54.5 LOW BACK PAIN: ICD-10-CM

## 2021-05-26 PROCEDURE — 99213 OFFICE O/P EST LOW 20 MIN: CPT

## 2021-05-26 PROCEDURE — 73522 X-RAY EXAM HIPS BI 3-4 VIEWS: CPT

## 2021-05-27 ENCOUNTER — APPOINTMENT (OUTPATIENT)
Dept: NEUROLOGY | Facility: CLINIC | Age: 66
End: 2021-05-27

## 2021-06-01 ENCOUNTER — APPOINTMENT (OUTPATIENT)
Dept: UROLOGY | Facility: CLINIC | Age: 66
End: 2021-06-01
Payer: MEDICARE

## 2021-06-01 VITALS — TEMPERATURE: 97 F

## 2021-06-01 DIAGNOSIS — R35.0 FREQUENCY OF MICTURITION: ICD-10-CM

## 2021-06-01 PROCEDURE — 99214 OFFICE O/P EST MOD 30 MIN: CPT

## 2021-06-01 NOTE — DISCUSSION/SUMMARY
[de-identified] : At this time, since I think this is more related to her low back pain than her hips, she is being referred back to her spine doctor.\par

## 2021-06-01 NOTE — HISTORY OF PRESENT ILLNESS
[8] : a current pain level of 8/10 [6] : the ailment interference is 6/10 [7] : the ailment interference is 7/10 [de-identified] : The patient comes in today with complaints of pain to her bilateral hips.  She points to her low back as the source of the pain.  She has also had prior back surgery.  The patient states the onset/injury occurred 3 weeks ago.  This injury is not work related or due to an automobile accident.  The patient states the pain is across the back. [] : No

## 2021-06-01 NOTE — ADDENDUM
[FreeTextEntry1] : This note was written by Roberto Talley on 06/01/2021, acting as a scribe for Tyrone Falcon III, MD

## 2021-06-01 NOTE — CONSULT LETTER
[Dear  ___] : Dear  [unfilled], [Consult Letter:] : I had the pleasure of evaluating your patient, [unfilled]. [Please see my note below.] : Please see my note below. [Consult Closing:] : Thank you very much for allowing me to participate in the care of this patient.  If you have any questions, please do not hesitate to contact me. [Sincerely,] : Sincerely, [FreeTextEntry3] : Tyrone Falcon III, MD \par SYED/jaylin\par

## 2021-06-01 NOTE — PHYSICAL EXAM
[de-identified] : Right Hip: Range of Motion in Degrees:\par 	                                 Claimant:	   Normal:	\par Flexion (Active) 	                 120 	   120-degrees	\par Flexion (Passive)	                 120	   120-degrees	\par Extension (Active)	                 -30	   -30-degrees	\par Extension (Passive)	 -30	   -30-degrees	\par Abduction (Active)	                 45-50	   10-50-xdysejh	\par Abduction (Passive)	 45-50	   30-03-lixkpbx	\par Adduction (Active)  	 20-30	   10-21-ljvrtno	\par Adduction (Passive)	 20-30	   43-83-ajardsr	\par Internal Rotation (Active) 	 35	   35-degrees	\par Internal Rotation (Passive)	 35	   35-degrees	\par External Rotation (Active)	 45	   45-degrees	\par External Rotation (Passive)	 45	   45-degrees	\par \par No tenderness with internal or external rotation or axial load.  No tenderness to palpation over the greater trochanter.  Negative Trendelenburg.  No tenderness with resisted abduction.  No weakness to flexion, extension, abduction or adduction.  No evidence of instability.  No motor or sensory deficits.  2+ DP and PT pulses.  Skin is intact.  No scars, rashes or lesions.  \par  \par Left Hip: Range of Motion in Degrees:\par 	                                 Claimant:	   Normal:	\par Flexion (Active) 	                 120 	   120-degrees	\par Flexion (Passive)	                 120	   120-degrees	\par Extension (Active)	                 -30	   -30-degrees	\par Extension (Passive)	 -30	   -30-degrees	\par Abduction (Active)	                 45-50	   39-85-nnqsjmn	\par Abduction (Passive)	 45-50	   04-69-xrlngje	\par Adduction (Active)  	 20-30	   63-87-wvofiih	\par Adduction (Passive)	 20-30	   71-84-xuwunne	\par Internal Rotation (Active) 	 35	   35-degrees	\par Internal Rotation (Passive)	 35	   35-degrees	\par External Rotation (Active)	 45	   45-degrees	\par External Rotation (Passive)	 45	   45-degrees	\par \par No tenderness with internal or external rotation or axial load.  No tenderness to palpation over the greater trochanter.  Negative Trendelenburg.  No tenderness with resisted abduction.  No weakness to flexion, extension, abduction or adduction.  No evidence of instability.  No motor or sensory deficits.  2+ DP and PT pulses.  Skin is intact.  No scars, rashes or lesions.  \par  \par Lumbar Spine:  Diffuse lumbar paraspinal spasm.\par  [de-identified] : Gait and Station:  Ambulating with a slightly antalgic to antalgic gait.  Normal Station.  [de-identified] : Appearance:  Well developed, well-nourished female in no acute distress.\par   [de-identified] : Radiographs, two to three views of the right hip and pelvis, show minimal degenerative changes and evidence of prior back surgery.\par Radiographs, two to three views of the left hip and pelvis, show minimal degenerative changes and evidence of prior back surgery.\par \par

## 2021-06-01 NOTE — REVIEW OF SYSTEMS
[Arthralgia] : arthralgia [Joint Pain] : joint pain [Sore Throat] : sore throat [Sleep Disturbances] : ~T sleep disturbances [Feeling Weak] : feeling weak [Muscle Weakness] : muscle weakness [Easy Bleeding] : a tendency for easy bleeding [Negative] : Neurological

## 2021-06-02 ENCOUNTER — NON-APPOINTMENT (OUTPATIENT)
Age: 66
End: 2021-06-02

## 2021-06-02 LAB
APPEARANCE: ABNORMAL
BACTERIA: NEGATIVE
BILIRUBIN URINE: NEGATIVE
BLOOD URINE: ABNORMAL
CALCIUM OXALATE CRYSTALS: ABNORMAL
COLOR: ABNORMAL
GLUCOSE QUALITATIVE U: NEGATIVE
HYALINE CASTS: 0 /LPF
KETONES URINE: NEGATIVE
LEUKOCYTE ESTERASE URINE: ABNORMAL
MICROSCOPIC-UA: NORMAL
NITRITE URINE: NEGATIVE
PH URINE: 6
PROTEIN URINE: ABNORMAL
RED BLOOD CELLS URINE: >720 /HPF
SPECIFIC GRAVITY URINE: 1.03
SQUAMOUS EPITHELIAL CELLS: 2 /HPF
UROBILINOGEN URINE: NORMAL
WHITE BLOOD CELLS URINE: 24 /HPF

## 2021-06-03 LAB — BACTERIA UR CULT: NORMAL

## 2021-06-04 ENCOUNTER — RX RENEWAL (OUTPATIENT)
Age: 66
End: 2021-06-04

## 2021-06-07 ENCOUNTER — APPOINTMENT (OUTPATIENT)
Dept: FAMILY MEDICINE | Facility: CLINIC | Age: 66
End: 2021-06-07
Payer: MEDICARE

## 2021-06-07 VITALS
SYSTOLIC BLOOD PRESSURE: 122 MMHG | BODY MASS INDEX: 36.19 KG/M2 | WEIGHT: 212 LBS | HEART RATE: 77 BPM | OXYGEN SATURATION: 98 % | HEIGHT: 64 IN | DIASTOLIC BLOOD PRESSURE: 80 MMHG | TEMPERATURE: 97.7 F

## 2021-06-07 DIAGNOSIS — L65.9 NONSCARRING HAIR LOSS, UNSPECIFIED: ICD-10-CM

## 2021-06-07 DIAGNOSIS — L60.9 NAIL DISORDER, UNSPECIFIED: ICD-10-CM

## 2021-06-07 PROBLEM — R35.0 URINE FREQUENCY: Status: ACTIVE | Noted: 2021-06-07

## 2021-06-07 PROCEDURE — 36415 COLL VENOUS BLD VENIPUNCTURE: CPT

## 2021-06-07 PROCEDURE — 99214 OFFICE O/P EST MOD 30 MIN: CPT | Mod: 25

## 2021-06-07 RX ORDER — CEFUROXIME AXETIL 500 MG/1
500 TABLET ORAL
Qty: 14 | Refills: 0 | Status: DISCONTINUED | COMMUNITY
Start: 2021-03-09 | End: 2021-06-07

## 2021-06-07 RX ORDER — PREDNISONE 10 MG/1
10 TABLET ORAL
Qty: 10 | Refills: 0 | Status: DISCONTINUED | COMMUNITY
Start: 2021-04-23 | End: 2021-06-07

## 2021-06-07 RX ORDER — SULFAMETHOXAZOLE AND TRIMETHOPRIM 800; 160 MG/1; MG/1
800-160 TABLET ORAL TWICE DAILY
Qty: 10 | Refills: 0 | Status: DISCONTINUED | COMMUNITY
Start: 2020-09-21 | End: 2021-06-07

## 2021-06-07 NOTE — ASSESSMENT
[FreeTextEntry1] : Hair loss- check blood work, blood drawn in office, reassess, likely will refer patient to dermatology \par Nail changes- may be secondary from underlying iron deficiency anemia and diabetes, will check blood work, reassess, likely refer to dermatology \par Anemia- check blood work, reassess\par DM II- last HgA1C 6.6, on Metformin, Januvia, Glimepiride, check blood work, reassess

## 2021-06-07 NOTE — REVIEW OF SYSTEMS
[Nail Changes] : nail changes [Hair Changes] : hair changes [Hematuria] : hematuria [Fever] : no fever [Chills] : no chills [Chest Pain] : no chest pain [Shortness Of Breath] : no shortness of breath [de-identified] : as per HPI

## 2021-06-07 NOTE — ASSESSMENT
[FreeTextEntry1] : Gross hematuria:\par Will get Urinalysis and Urine culture \par Start Cefuroxime \par Will check Gynecology regarding vaginal Estrogen.\par After discussing with Gynecology will consider repeat Cystoscopy and bladder biopsy with fulguration. \par \par Urinary frequency:\par Gave samples of Myrbetriq. \par Explained common side effects: HTN, urinary retention, nasopharyngitis, headache, tachycardia. \par Asked to update us in a few weeks.

## 2021-06-07 NOTE — PHYSICAL EXAM
[No Acute Distress] : no acute distress [Well Nourished] : well nourished [Well Developed] : well developed [Normal Appearance] : was normal in appearance [Neck Supple] : was supple [No Respiratory Distress] : no respiratory distress  [Clear to Auscultation] : lungs were clear to auscultation bilaterally [Normal Rate] : normal rate  [Regular Rhythm] : with a regular rhythm [Normal S1, S2] : normal S1 and S2 [No Murmur] : no murmur heard [No Edema] : there was no peripheral edema [Alert and Oriented x3] : oriented to person, place, and time [de-identified] : hair loss; indentations to fingernails

## 2021-06-07 NOTE — HISTORY OF PRESENT ILLNESS
[FreeTextEntry8] : \par 65 year old female presents concerned about hair loss, indentation to her fingernails \par \par remains with gross hematuria \par following with urology \par \par has h/o iron deficiency anemia, Crohn's colitis \par has not taken iron supplementation for the past 3 weeks\par \par reports losing hair over the past 2 months\par also noticed indentations to her fingernails over the past week \par \par Diabetic- last HgA1C 6.6 from March 2021 \par On Metformin, Januvia, Glimepiride

## 2021-06-07 NOTE — HISTORY OF PRESENT ILLNESS
[FreeTextEntry1] : 64 yo female presents for gross hematuria. \par Was better for 2 weeks after procedure, then again gross hematuria, blood tinged urine or  brown colored and  occasional clots. \par Urinating every 1-2 hours, day and night. Has off and on suprapubic pain and dysuria. \par Has seen Gynecology and has been ruled out for Gyn issues. \par \par Status post Cystoscopy and bladder biopsy with fulguration done on 4/12/21 at [facility].\par Pathology: patchy inflammation.\par \par Recently seen for gross hematuria. \par Had been having off and on gross hematuria for last 1 year, was being treated with Antibiotics. \par Last urine negative for infection but still had gross hematuria. \par Complained of off and on suprapubic pain and dysuria.  Has chronic back pain. \par Urinates every 3  hours or so during the day and night every 1-2 hours or so. \par Denied nausea, vomiting, fever, chills or rigors. \par Past smoker: < 1PPD for 20 years, quit 40 years ago.\par \par Initially seen for Ureteral stone.  \par Passed ureteral stone in 2018, did not follow up.\par Went to [facility] with severe Lt Flank pain, some nausea and vomiting. \par Had CT scan was told has kidney stone and sent home on Flomax, Pain meds. \par Did not see the stone pass. Pain has decreased but still needed pain meds off and on and was taking Flomax BID. Had chronic back pain, few weeks before the ER visit had been having back pain and passed 2 stones. No prior history of Kidney stone. \par Since ER visit had been urinating every hour or so with urgency. Saw some blood 2 days ago. \par \par

## 2021-06-09 LAB
25(OH)D3 SERPL-MCNC: 24.3 NG/ML
ALBUMIN SERPL ELPH-MCNC: 4.2 G/DL
ALP BLD-CCNC: 125 U/L
ALT SERPL-CCNC: 15 U/L
ANION GAP SERPL CALC-SCNC: 16 MMOL/L
AST SERPL-CCNC: 16 U/L
BASOPHILS # BLD AUTO: 0.05 K/UL
BASOPHILS NFR BLD AUTO: 0.6 %
BILIRUB SERPL-MCNC: 0.2 MG/DL
BUN SERPL-MCNC: 19 MG/DL
CALCIUM SERPL-MCNC: 9.3 MG/DL
CHLORIDE SERPL-SCNC: 102 MMOL/L
CO2 SERPL-SCNC: 24 MMOL/L
CREAT SERPL-MCNC: 0.55 MG/DL
EOSINOPHIL # BLD AUTO: 0.14 K/UL
EOSINOPHIL NFR BLD AUTO: 1.6 %
ESTIMATED AVERAGE GLUCOSE: 146 MG/DL
FERRITIN SERPL-MCNC: 61 NG/ML
FOLATE SERPL-MCNC: 5.1 NG/ML
GLUCOSE SERPL-MCNC: 173 MG/DL
HBA1C MFR BLD HPLC: 6.7 %
HCT VFR BLD CALC: 36.7 %
HGB BLD-MCNC: 11.2 G/DL
IMM GRANULOCYTES NFR BLD AUTO: 0.6 %
IRON SATN MFR SERPL: 11 %
IRON SERPL-MCNC: 46 UG/DL
LYMPHOCYTES # BLD AUTO: 1.09 K/UL
LYMPHOCYTES NFR BLD AUTO: 12.3 %
MAN DIFF?: NORMAL
MCHC RBC-ENTMCNC: 30.1 PG
MCHC RBC-ENTMCNC: 30.5 GM/DL
MCV RBC AUTO: 98.7 FL
MONOCYTES # BLD AUTO: 0.62 K/UL
MONOCYTES NFR BLD AUTO: 7 %
NEUTROPHILS # BLD AUTO: 6.91 K/UL
NEUTROPHILS NFR BLD AUTO: 77.9 %
PLATELET # BLD AUTO: 169 K/UL
POTASSIUM SERPL-SCNC: 4.2 MMOL/L
PROT SERPL-MCNC: 6.2 G/DL
RBC # BLD: 3.72 M/UL
RBC # FLD: 15.3 %
SODIUM SERPL-SCNC: 142 MMOL/L
T4 FREE SERPL-MCNC: 1.1 NG/DL
TIBC SERPL-MCNC: 406 UG/DL
TSH SERPL-ACNC: 1.9 UIU/ML
UIBC SERPL-MCNC: 360 UG/DL
VIT B12 SERPL-MCNC: 603 PG/ML
WBC # FLD AUTO: 8.86 K/UL

## 2021-06-14 ENCOUNTER — APPOINTMENT (OUTPATIENT)
Dept: GASTROENTEROLOGY | Facility: CLINIC | Age: 66
End: 2021-06-14
Payer: MEDICARE

## 2021-06-14 VITALS
BODY MASS INDEX: 35.32 KG/M2 | DIASTOLIC BLOOD PRESSURE: 79 MMHG | SYSTOLIC BLOOD PRESSURE: 123 MMHG | WEIGHT: 212 LBS | HEART RATE: 66 BPM | HEIGHT: 65 IN

## 2021-06-14 PROCEDURE — 99214 OFFICE O/P EST MOD 30 MIN: CPT

## 2021-06-15 ENCOUNTER — NON-APPOINTMENT (OUTPATIENT)
Age: 66
End: 2021-06-15

## 2021-06-15 ENCOUNTER — APPOINTMENT (OUTPATIENT)
Dept: UROLOGY | Facility: CLINIC | Age: 66
End: 2021-06-15
Payer: MEDICARE

## 2021-06-15 VITALS — DIASTOLIC BLOOD PRESSURE: 91 MMHG | SYSTOLIC BLOOD PRESSURE: 158 MMHG | TEMPERATURE: 97.3 F | HEART RATE: 94 BPM

## 2021-06-15 PROCEDURE — 51701 INSERT BLADDER CATHETER: CPT

## 2021-06-16 LAB
APPEARANCE: ABNORMAL
BACTERIA: NEGATIVE
BILIRUBIN URINE: NEGATIVE
BLOOD URINE: ABNORMAL
COLOR: YELLOW
GLUCOSE QUALITATIVE U: NEGATIVE
HYALINE CASTS: 3 /LPF
KETONES URINE: NORMAL
LEUKOCYTE ESTERASE URINE: ABNORMAL
MICROSCOPIC-UA: NORMAL
NITRITE URINE: NEGATIVE
PH URINE: 6
PROTEIN URINE: ABNORMAL
RED BLOOD CELLS URINE: >720 /HPF
SPECIFIC GRAVITY URINE: 1.04
SQUAMOUS EPITHELIAL CELLS: 3 /HPF
UROBILINOGEN URINE: NORMAL
WHITE BLOOD CELLS URINE: 45 /HPF

## 2021-06-17 LAB — BACTERIA UR CULT: NORMAL

## 2021-06-19 NOTE — PHYSICAL EXAM
[General Appearance - Alert] : alert [General Appearance - In No Acute Distress] : in no acute distress [Auscultation Breath Sounds / Voice Sounds] : lungs were clear to auscultation bilaterally [Heart Sounds] : normal S1 and S2 [Heart Rate And Rhythm] : heart rate was normal and rhythm regular [Heart Sounds Gallop] : no gallops [Murmurs] : no murmurs [Heart Sounds Pericardial Friction Rub] : no pericardial rub [Bowel Sounds] : normal bowel sounds [Abdomen Soft] : soft [Abdomen Tenderness] : non-tender [] : no hepato-splenomegaly [Abdomen Mass (___ Cm)] : no abdominal mass palpated [Abnormal Walk] : normal gait [Musculoskeletal - Swelling] : no joint swelling seen [Nail Clubbing] : no clubbing  or cyanosis of the fingernails [Motor Tone] : muscle strength and tone were normal [Oriented To Time, Place, And Person] : oriented to person, place, and time [Affect] : the affect was normal [Impaired Insight] : insight and judgment were intact

## 2021-06-19 NOTE — HISTORY OF PRESENT ILLNESS
[de-identified] : 64yo female with hx gastric sleeve revision\par \par She had recent EGD with evidence of gastric sleeve revision, but exact operation unclear\par She is still with increased gerd and belching no help with pantoprazole bid. Her symptoms have increased

## 2021-06-19 NOTE — ASSESSMENT
[FreeTextEntry1] : 66yo female with gerd, belching with hx gastric bariatric surgery with complication needing revision\par \par will change to dexilant\par \par check ugi series to better define anatomy, and see extent of reflux

## 2021-06-25 ENCOUNTER — APPOINTMENT (OUTPATIENT)
Dept: FAMILY MEDICINE | Facility: CLINIC | Age: 66
End: 2021-06-25
Payer: MEDICARE

## 2021-06-25 VITALS
HEART RATE: 91 BPM | SYSTOLIC BLOOD PRESSURE: 118 MMHG | WEIGHT: 209 LBS | DIASTOLIC BLOOD PRESSURE: 74 MMHG | TEMPERATURE: 97.2 F | HEIGHT: 64 IN | BODY MASS INDEX: 35.68 KG/M2 | OXYGEN SATURATION: 99 %

## 2021-06-25 PROCEDURE — 99213 OFFICE O/P EST LOW 20 MIN: CPT | Mod: 25

## 2021-06-25 PROCEDURE — 36415 COLL VENOUS BLD VENIPUNCTURE: CPT

## 2021-06-27 NOTE — HISTORY OF PRESENT ILLNESS
[de-identified] : Pt for f/u iron defic anemia, Crohn's colitis. Pt supplementing fe daily for past few weeks will recheck cbc.\par Pt following urology for w/u of hematuria.

## 2021-06-28 LAB
BASOPHILS # BLD AUTO: 0.06 K/UL
BASOPHILS NFR BLD AUTO: 0.7 %
EOSINOPHIL # BLD AUTO: 0.1 K/UL
EOSINOPHIL NFR BLD AUTO: 1.2 %
FERRITIN SERPL-MCNC: 56 NG/ML
HCT VFR BLD CALC: 38.4 %
HGB BLD-MCNC: 11.2 G/DL
IMM GRANULOCYTES NFR BLD AUTO: 1.3 %
IRON SATN MFR SERPL: 14 %
IRON SERPL-MCNC: 54 UG/DL
LYMPHOCYTES # BLD AUTO: 1.26 K/UL
LYMPHOCYTES NFR BLD AUTO: 15.2 %
MAN DIFF?: NORMAL
MCHC RBC-ENTMCNC: 29.2 GM/DL
MCHC RBC-ENTMCNC: 29.4 PG
MCV RBC AUTO: 100.8 FL
MONOCYTES # BLD AUTO: 0.58 K/UL
MONOCYTES NFR BLD AUTO: 7 %
NEUTROPHILS # BLD AUTO: 6.19 K/UL
NEUTROPHILS NFR BLD AUTO: 74.6 %
PLATELET # BLD AUTO: 187 K/UL
RBC # BLD: 3.81 M/UL
RBC # FLD: 15.6 %
TIBC SERPL-MCNC: 371 UG/DL
UIBC SERPL-MCNC: 317 UG/DL
WBC # FLD AUTO: 8.3 K/UL

## 2021-06-29 ENCOUNTER — OUTPATIENT (OUTPATIENT)
Dept: OUTPATIENT SERVICES | Facility: HOSPITAL | Age: 66
LOS: 1 days | End: 2021-06-29
Payer: MEDICARE

## 2021-06-29 DIAGNOSIS — Z98.84 BARIATRIC SURGERY STATUS: Chronic | ICD-10-CM

## 2021-06-29 DIAGNOSIS — Z98.89 OTHER SPECIFIED POSTPROCEDURAL STATES: Chronic | ICD-10-CM

## 2021-06-29 DIAGNOSIS — K46.9 UNSPECIFIED ABDOMINAL HERNIA WITHOUT OBSTRUCTION OR GANGRENE: Chronic | ICD-10-CM

## 2021-06-29 DIAGNOSIS — Z41.1 ENCOUNTER FOR COSMETIC SURGERY: Chronic | ICD-10-CM

## 2021-06-29 DIAGNOSIS — M75.120 COMPLETE ROTATOR CUFF TEAR OR RUPTURE OF UNSPECIFIED SHOULDER, NOT SPECIFIED AS TRAUMATIC: Chronic | ICD-10-CM

## 2021-06-29 DIAGNOSIS — L02.92 FURUNCLE, UNSPECIFIED: Chronic | ICD-10-CM

## 2021-06-29 DIAGNOSIS — Z96.653 PRESENCE OF ARTIFICIAL KNEE JOINT, BILATERAL: Chronic | ICD-10-CM

## 2021-06-29 DIAGNOSIS — K21.9 GASTRO-ESOPHAGEAL REFLUX DISEASE WITHOUT ESOPHAGITIS: ICD-10-CM

## 2021-06-29 DIAGNOSIS — Z90.49 ACQUIRED ABSENCE OF OTHER SPECIFIED PARTS OF DIGESTIVE TRACT: Chronic | ICD-10-CM

## 2021-06-29 PROCEDURE — 74240 X-RAY XM UPR GI TRC 1CNTRST: CPT

## 2021-06-29 PROCEDURE — 74240 X-RAY XM UPR GI TRC 1CNTRST: CPT | Mod: 26

## 2021-07-02 ENCOUNTER — TRANSCRIPTION ENCOUNTER (OUTPATIENT)
Age: 66
End: 2021-07-02

## 2021-07-08 ENCOUNTER — RX RENEWAL (OUTPATIENT)
Age: 66
End: 2021-07-08

## 2021-07-13 ENCOUNTER — APPOINTMENT (OUTPATIENT)
Dept: DERMATOLOGY | Facility: CLINIC | Age: 66
End: 2021-07-13
Payer: MEDICARE

## 2021-07-13 DIAGNOSIS — Z91.89 OTHER SPECIFIED PERSONAL RISK FACTORS, NOT ELSEWHERE CLASSIFIED: ICD-10-CM

## 2021-07-13 DIAGNOSIS — Z80.8 FAMILY HISTORY OF MALIGNANT NEOPLASM OF OTHER ORGANS OR SYSTEMS: ICD-10-CM

## 2021-07-13 PROCEDURE — 99204 OFFICE O/P NEW MOD 45 MIN: CPT

## 2021-07-13 RX ORDER — CEFUROXIME AXETIL 500 MG/1
500 TABLET ORAL
Qty: 14 | Refills: 0 | Status: DISCONTINUED | COMMUNITY
Start: 2021-06-01 | End: 2021-07-13

## 2021-07-13 NOTE — ASSESSMENT
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

## 2021-07-13 NOTE — HISTORY OF PRESENT ILLNESS
[FreeTextEntry1] : Hair loss and nail dystrophy [de-identified] : First visit for 65-year-old white female referred by John Reyes, D.O., with a four-month history of marked hair loss. Patient sees hair coming out.\par \par Patient also complains of a two-month history of dystrophy of the fingernails. No previous treatment. No previous episodes.\par \par Note: Patient was hospitalized with COVID-19 iin January and February of 2021 with the development of pneumonia.

## 2021-07-13 NOTE — PHYSICAL EXAM
[Alert] : alert [Oriented x 3] : ~L oriented x 3 [Well Nourished] : well nourished [FreeTextEntry3] : Type II skin\par \par Patient wearing a facemask\par \par Patient is very tan\par \par Scalp: Mild diffuse thinning without underlying rash\par 1-2 hairs removed per gentle tug on the crown and temporal scalp\par 3-4 hairs removed with gentle tug in the occipital scalp\par \par  Fingernails: Horizontal depressions present in all 5 fingernails of the left hand and the right second, third, and fourth fingernails on the right hand t( patient states she has cut some of these off as they have grown out.)

## 2021-07-13 NOTE — CONSULT LETTER
[Dear  ___] : Dear  [unfilled], [Consult Letter:] : I had the pleasure of evaluating your patient, [unfilled]. [Consult Closing:] : Thank you very much for allowing me to participate in the care of this patient.  If you have any questions, please do not hesitate to contact me. [Sincerely,] : Sincerely, [FreeTextEntry2] : John Reyes, DO [FreeTextEntry1] : She has a telogen effluvium and Beau's lines of the nails, both sequelae of her prior COVID-19 infection.\par \par Both problems should spontaneously remit over the next 6-12 months.\par \par Please see attached chart note for further details. [FreeTextEntry3] : Guevara Melton MD\par [address], [address]\par [place] [address]\par Tel ([phone])\par Fax ([phone])\par Private line ([phone])\par

## 2021-07-26 ENCOUNTER — LABORATORY RESULT (OUTPATIENT)
Age: 66
End: 2021-07-26

## 2021-07-26 ENCOUNTER — APPOINTMENT (OUTPATIENT)
Dept: FAMILY MEDICINE | Facility: CLINIC | Age: 66
End: 2021-07-26
Payer: MEDICARE

## 2021-07-26 VITALS
RESPIRATION RATE: 16 BRPM | DIASTOLIC BLOOD PRESSURE: 70 MMHG | HEIGHT: 64 IN | TEMPERATURE: 97.9 F | HEART RATE: 95 BPM | WEIGHT: 214 LBS | BODY MASS INDEX: 36.54 KG/M2 | SYSTOLIC BLOOD PRESSURE: 124 MMHG | OXYGEN SATURATION: 96 %

## 2021-07-26 LAB
BILIRUB UR QL STRIP: NORMAL
GLUCOSE UR-MCNC: NORMAL
HCG UR QL: 0.2 EU/DL
HGB UR QL STRIP.AUTO: NORMAL
KETONES UR-MCNC: NORMAL
LEUKOCYTE ESTERASE UR QL STRIP: NORMAL
NITRITE UR QL STRIP: NEGATIVE
PH UR STRIP: 5.5
PROT UR STRIP-MCNC: NORMAL
SP GR UR STRIP: >=1.03

## 2021-07-26 PROCEDURE — 81003 URINALYSIS AUTO W/O SCOPE: CPT | Mod: QW

## 2021-07-26 PROCEDURE — 99214 OFFICE O/P EST MOD 30 MIN: CPT | Mod: 25

## 2021-07-26 NOTE — HISTORY OF PRESENT ILLNESS
[FreeTextEntry8] : 2 days of frequency urgency dysuria this has been a recurrent problem.  Has seen urology recent cystoscopy showed bladder irritation she has chronic hematuria.  No fever low back pain consistent with musculoskeletal problem no CVA tenderness lower abdominal tenderness due to Crohn's which has recently been evaluated.  Type 2 diabetes recent A1c 6.7\par \par Urinalysis shows large blood negative nitrites positive leukocytes positive protein positive glucose\par \par Pyridium ordered will await urine culture and prescribe antibiotics if indicated

## 2021-07-27 ENCOUNTER — APPOINTMENT (OUTPATIENT)
Dept: UROLOGY | Facility: CLINIC | Age: 66
End: 2021-07-27
Payer: MEDICARE

## 2021-07-27 VITALS — TEMPERATURE: 98.1 F

## 2021-07-27 LAB
APPEARANCE: ABNORMAL
BILIRUBIN URINE: ABNORMAL
BLOOD URINE: ABNORMAL
COLOR: ABNORMAL
GLUCOSE QUALITATIVE U: ABNORMAL
KETONES URINE: NORMAL
LEUKOCYTE ESTERASE URINE: ABNORMAL
NITRITE URINE: POSITIVE
PH URINE: 5
PROTEIN URINE: ABNORMAL
SPECIFIC GRAVITY URINE: >=1.03
UROBILINOGEN URINE: NORMAL

## 2021-07-27 PROCEDURE — 99214 OFFICE O/P EST MOD 30 MIN: CPT

## 2021-07-30 ENCOUNTER — NON-APPOINTMENT (OUTPATIENT)
Age: 66
End: 2021-07-30

## 2021-07-30 LAB — BACTERIA UR CULT: ABNORMAL

## 2021-08-02 ENCOUNTER — NON-APPOINTMENT (OUTPATIENT)
Age: 66
End: 2021-08-02

## 2021-08-03 ENCOUNTER — APPOINTMENT (OUTPATIENT)
Dept: NEUROLOGY | Facility: CLINIC | Age: 66
End: 2021-08-03
Payer: MEDICARE

## 2021-08-03 VITALS
HEART RATE: 98 BPM | HEIGHT: 64 IN | DIASTOLIC BLOOD PRESSURE: 70 MMHG | WEIGHT: 212 LBS | SYSTOLIC BLOOD PRESSURE: 100 MMHG | TEMPERATURE: 97.2 F | BODY MASS INDEX: 36.19 KG/M2

## 2021-08-03 DIAGNOSIS — R42 DIZZINESS AND GIDDINESS: ICD-10-CM

## 2021-08-03 PROCEDURE — 99213 OFFICE O/P EST LOW 20 MIN: CPT

## 2021-08-03 NOTE — DATA REVIEWED
[de-identified] : MRI brain without contrast 1/8/20: \par No evidence of acute infarction, hemorrhage or hydrocephalus.\par Minimal non-acute small vessel ischemic change.\par \par MRI brain with and without contrast and seizure protocol 9/14/20:\par \par No hydrocephalus, mass effect, acute intracranial hemorrhage, vasogenic edema, or acute territorial infarct.\par \par Tiny nonspecific focus of increased T2 and FLAIR hyperintense signal involving the superior right insular cortex (6-20). This could represent a small chronic infarct. Cortical dysplasia is considered much less likely.\par \par No abnormal parenchymal or leptomeningeal enhancement.\par \par No evidence for mesial temporal sclerosis or gray matter heterotopia.\par  [de-identified] : Routine EEG 8/19/20: \par This was an abnormal EEG study in the awake and drowsy states due to the presence of:\par -Bilateral independent temporal sharp waves, left more frequent than right, with phase reversal at F7 and F8.\par \par EEG Impression/Clinical Correlate:\par The findings are consistent with a risk for focal onset seizures.\par  Consider a repeat study if clinically indicated. \par \par 24 hour EEG 8/19/20-8/20/20:\par \par EEG Summary/Classification:\par This was an abnormal EEG study in the awake, drowsy, and asleep states due to the presence of bilateral independent temporal sharp waves with phase reversal at F7 and F8, seen more frequently on the left.\par \par EEG Impression/Clinical Correlate:\par The findings are suggestive of an risk for focal onset seizures. \par \par EEG 3/10/21:\par B/L Independent temporal sharp waves [de-identified] : Blood tests 6/22/20:\par TSH 3.1, vitamin D 27.4\par Blood tests 1/8/20: B12 667\par HbA1C 7.6

## 2021-08-03 NOTE — DISCUSSION/SUMMARY
[FreeTextEntry1] : Ms. García is a 65 year old woman who initially presented for evaluation of frequent falls.\par She also reports having concerns about her memory.\par \par She has had exacerbation of symptoms since infection with covid-19.\par \par Frequent Falls\par -Falls have improved overall\par -No suggestion of NPH on MRI.\par -EEG showed some b/l temporal sharp waves. Temporal lobe seizures would not necessarily cause falls.\par -MRI brain and c-spine ordered by Dr. Joiner\par \par Dizziness/spotty vision\par -Possibly related to low BP\par -Advised to increase hydration\par \par \par Memory Impairment\par -She scored 21/30 on the Red Cognitive Assessment initially with delayed recall being the area of worst performance.\par -Vitamin B12 and TSH levels normal.\par -Now with worsening of memory since covid, possibly anxiety component and brain fog\par -Will work with therapist\par -Participate in mentally stimulating activities. Encouraged reading, puzzles, etc.\par \par \par Possible seizures.\par -Possible focal seizures contributing to memory loss. EEG was abnormal\par -Did not tolerate Keppra\par -continue oxcarbazepine 450 mg BID\par -Na levels normal (most recent on 6/7/21 was 142).\par -Will check level\par \par f/u 2 months sooner if needed\par

## 2021-08-03 NOTE — HISTORY OF PRESENT ILLNESS
[FreeTextEntry1] : I last saw Ms. García on 3/3/21.\par \par She reports recent worsening of dizziness.\par She fell down a few steps on 7/3/21.\par She hit her ribs.\par There have been other instances when she has caught herself.\par \par She reports seeing spots on her vision.\par She admits to not drinking enough water.\par \par She has difficulty with word finding difficulty.\par \par She is not aware of having any seizures.\par \par \par She saw neurosurgery, Dr. Anibal Joiner yesterday.\par He ordered MRI brain and cervical spine.\par \par She also saw Dr. Sindhu Link (pain management) yesterday.\par \par She got her covid vaccines.

## 2021-08-03 NOTE — PHYSICAL EXAM
[FreeTextEntry1] : Examination:\par Constitutional: normal, no apparent distress\par Eyes: normal conjunctiva b/l, no ptosis, visual fields full\par Respiratory: no respiratory distress, normal effort, normal auscultation\par Cardiovascular: normal rate, rhythm, no murmurs\par Neck: supple, no masses\par Vascular: carotids normal\par Skin: normal color, no rashes\par Psych: normal mood, affect\par \par Neurological:\par Memory:  oriented to person, place, time. Recalled 2/3 words after 3 minutes. Difficulty with serial 7s.\par Language intact/no aphasia\par Cranial Nerves: II-XII intact, Pupils equally round and reactive to light, ocular muscles/movements intact, no ptosis, no facial weakness, tongue protrudes normally in the midline, \par Motor: normal tone, no pronator drift, full strength in all four extremities in the proximal and distal muscle groups\par Coordination: Fine motor movements intact, rapid alternating movements intact, finger to nose intact bilaterally\par Sensory: intact to light touch\par DTRs: symmetric, normal\par Gait: narrow based, steady\par

## 2021-08-09 NOTE — ASSESSMENT
[FreeTextEntry1] : Gross hematuria:\par Urine culture sent yesterday with PCP. Will follow.  \par \par Overactive Bladder:\par Continue Myrbetriq. \par \par Atrophic vaginitis:\par Discussed with malina Wade with Patient being on Estrogen. \par Will start Estrogen. \par \par Return to office in 2 months or sooner if any issues.

## 2021-08-10 ENCOUNTER — OUTPATIENT (OUTPATIENT)
Dept: OUTPATIENT SERVICES | Facility: HOSPITAL | Age: 66
LOS: 1 days | End: 2021-08-10
Payer: MEDICARE

## 2021-08-10 ENCOUNTER — APPOINTMENT (OUTPATIENT)
Dept: MRI IMAGING | Facility: CLINIC | Age: 66
End: 2021-08-10
Payer: MEDICARE

## 2021-08-10 DIAGNOSIS — Z96.653 PRESENCE OF ARTIFICIAL KNEE JOINT, BILATERAL: Chronic | ICD-10-CM

## 2021-08-10 DIAGNOSIS — Z98.89 OTHER SPECIFIED POSTPROCEDURAL STATES: Chronic | ICD-10-CM

## 2021-08-10 DIAGNOSIS — L02.92 FURUNCLE, UNSPECIFIED: Chronic | ICD-10-CM

## 2021-08-10 DIAGNOSIS — Z98.84 BARIATRIC SURGERY STATUS: Chronic | ICD-10-CM

## 2021-08-10 DIAGNOSIS — K46.9 UNSPECIFIED ABDOMINAL HERNIA WITHOUT OBSTRUCTION OR GANGRENE: Chronic | ICD-10-CM

## 2021-08-10 DIAGNOSIS — Z00.8 ENCOUNTER FOR OTHER GENERAL EXAMINATION: ICD-10-CM

## 2021-08-10 DIAGNOSIS — Z41.1 ENCOUNTER FOR COSMETIC SURGERY: Chronic | ICD-10-CM

## 2021-08-10 DIAGNOSIS — Z90.49 ACQUIRED ABSENCE OF OTHER SPECIFIED PARTS OF DIGESTIVE TRACT: Chronic | ICD-10-CM

## 2021-08-10 DIAGNOSIS — M75.120 COMPLETE ROTATOR CUFF TEAR OR RUPTURE OF UNSPECIFIED SHOULDER, NOT SPECIFIED AS TRAUMATIC: Chronic | ICD-10-CM

## 2021-08-10 PROCEDURE — 72141 MRI NECK SPINE W/O DYE: CPT | Mod: MH

## 2021-08-10 PROCEDURE — 72141 MRI NECK SPINE W/O DYE: CPT | Mod: 26,MH

## 2021-08-10 PROCEDURE — 70551 MRI BRAIN STEM W/O DYE: CPT | Mod: 26,MH

## 2021-08-10 PROCEDURE — 70551 MRI BRAIN STEM W/O DYE: CPT | Mod: MH

## 2021-08-12 ENCOUNTER — NON-APPOINTMENT (OUTPATIENT)
Age: 66
End: 2021-08-12

## 2021-08-12 ENCOUNTER — APPOINTMENT (OUTPATIENT)
Dept: CARDIOLOGY | Facility: CLINIC | Age: 66
End: 2021-08-12
Payer: MEDICARE

## 2021-08-12 VITALS
HEIGHT: 64 IN | HEART RATE: 101 BPM | BODY MASS INDEX: 36.19 KG/M2 | WEIGHT: 212 LBS | OXYGEN SATURATION: 97 % | DIASTOLIC BLOOD PRESSURE: 78 MMHG | SYSTOLIC BLOOD PRESSURE: 120 MMHG

## 2021-08-12 PROCEDURE — 93000 ELECTROCARDIOGRAM COMPLETE: CPT

## 2021-08-12 PROCEDURE — 99213 OFFICE O/P EST LOW 20 MIN: CPT | Mod: 25

## 2021-08-12 NOTE — DISCUSSION/SUMMARY
[FreeTextEntry1] : Pt is a 64 y/o F PMH DM, hypothyroidism, asthma.  Today she states that she is feeling better - her breathing has improved\par \par TTE 04/2018 normal LV function, mild DD, mild PI\par TTE 04/2021 normal LV function without significant valvular pathology\par Nuclear stress test 09/2020 breast and diaphragm attenuation.  Small, mild defect in anterior wall that is reversible suggestive of mild ischemia, EF 66%\par Medical management\par \par DM:\par follows with PCP\par c/w current meds\par goal A1c <7\par Advised lifestyle modifications \par VSS\par \par Pt will return in 6 mnths or sooner as needed but I encouraged communication via phone or portal if necessary. \par The described plan was discussed with the pt.  All questions and concerns were addressed to the best of my knowledge.\par

## 2021-08-12 NOTE — HISTORY OF PRESENT ILLNESS
[FreeTextEntry1] : Pt is a 64 y/o F PMH DM, hypothyroidism, asthma.  Today she states that she is feeling better - her breathing has improved\par \par TTE 04/2018 normal LV function, mild DD, mild PI\par TTE 04/2021 normal LV function without significant valvular pathology\par Nuclear stress test 09/2020 breast and diaphragm attenuation.  Small, mild defect in anterior wall that is reversible suggestive of mild ischemia, EF 66%\par \par PMH: DM, hypothyroidism, asthma, nephrolithiasis, gastric bypass\par Smoking status: quit 16 yrs ago\par Current exercise: none\par Daily water intake: 32 oz\par Daily caffeine intake: 1 cup coffee\par OTC medications: see list\par Family hx: mother and father stroke, mother PPM, father ?MI at age 70\par Previous cardiac testing: stress test many yrs ago "normal"\par Previous hospitalizations: knee and back surgery\par

## 2021-08-16 LAB — OXCARBAZEPINE SERPL-MCNC: 14 UG/ML

## 2021-08-17 ENCOUNTER — APPOINTMENT (OUTPATIENT)
Dept: UROLOGY | Facility: CLINIC | Age: 66
End: 2021-08-17
Payer: MEDICARE

## 2021-08-17 VITALS — TEMPERATURE: 97.9 F | HEART RATE: 94 BPM | DIASTOLIC BLOOD PRESSURE: 66 MMHG | SYSTOLIC BLOOD PRESSURE: 93 MMHG

## 2021-08-17 PROCEDURE — 51702 INSERT TEMP BLADDER CATH: CPT

## 2021-08-18 LAB
APPEARANCE: ABNORMAL
BACTERIA: NEGATIVE
BILIRUBIN URINE: ABNORMAL
BLOOD URINE: ABNORMAL
COLOR: ABNORMAL
GLUCOSE QUALITATIVE U: ABNORMAL
HYALINE CASTS: 1 /LPF
KETONES URINE: ABNORMAL
LEUKOCYTE ESTERASE URINE: NEGATIVE
MICROSCOPIC-UA: NORMAL
NITRITE URINE: POSITIVE
PH URINE: 6.5
PROTEIN URINE: ABNORMAL
RED BLOOD CELLS URINE: >720 /HPF
SPECIFIC GRAVITY URINE: >=1.03
SQUAMOUS EPITHELIAL CELLS: 1 /HPF
UROBILINOGEN URINE: NORMAL
WHITE BLOOD CELLS URINE: 21 /HPF

## 2021-08-19 LAB — BACTERIA UR CULT: NORMAL

## 2021-08-23 ENCOUNTER — TRANSCRIPTION ENCOUNTER (OUTPATIENT)
Age: 66
End: 2021-08-23

## 2021-09-06 ENCOUNTER — RX RENEWAL (OUTPATIENT)
Age: 66
End: 2021-09-06

## 2021-09-21 ENCOUNTER — APPOINTMENT (OUTPATIENT)
Dept: FAMILY MEDICINE | Facility: CLINIC | Age: 66
End: 2021-09-21
Payer: MEDICARE

## 2021-09-21 ENCOUNTER — NON-APPOINTMENT (OUTPATIENT)
Age: 66
End: 2021-09-21

## 2021-09-21 ENCOUNTER — RX RENEWAL (OUTPATIENT)
Age: 66
End: 2021-09-21

## 2021-09-21 VITALS
BODY MASS INDEX: 36.88 KG/M2 | HEIGHT: 64 IN | WEIGHT: 216 LBS | SYSTOLIC BLOOD PRESSURE: 120 MMHG | HEART RATE: 96 BPM | OXYGEN SATURATION: 97 % | DIASTOLIC BLOOD PRESSURE: 68 MMHG | TEMPERATURE: 97.3 F

## 2021-09-21 DIAGNOSIS — R06.00 DYSPNEA, UNSPECIFIED: ICD-10-CM

## 2021-09-21 PROCEDURE — 99214 OFFICE O/P EST MOD 30 MIN: CPT | Mod: 25

## 2021-09-21 PROCEDURE — 93000 ELECTROCARDIOGRAM COMPLETE: CPT

## 2021-09-23 NOTE — HISTORY OF PRESENT ILLNESS
[FreeTextEntry8] : Pt c/o inc b/l LE edema in past few weeks. Legs acutely worsened in swelling past few days since going on trip to [place] where she drove x 12 hrs. Pt does report notably increased dyspnea on exertion and palpitations in past 2-3 weeks. \par Pt c/o significantly elevated glucose in past few weeks >200 with weight gain as she has not been eating as healthy.

## 2021-09-23 NOTE — ASSESSMENT
[FreeTextEntry1] : b/l LE edema - check venous duplex to r/o DVT as pt had recent long trip. otherwise start trial of lasix prn.\par palpitations - check K, Mg, TFTs. if wnl f/u with cardio. avoid caffeine. \par anemia, DM, edema- Check labs

## 2021-09-23 NOTE — PHYSICAL EXAM
[Normal] : affect was normal and insight and judgment were intact [de-identified] : b/l pitting edema up to knees [de-identified] : no significant erythema

## 2021-09-26 ENCOUNTER — OUTPATIENT (OUTPATIENT)
Dept: OUTPATIENT SERVICES | Facility: HOSPITAL | Age: 66
LOS: 1 days | End: 2021-09-26
Payer: MEDICARE

## 2021-09-26 ENCOUNTER — APPOINTMENT (OUTPATIENT)
Dept: ULTRASOUND IMAGING | Facility: CLINIC | Age: 66
End: 2021-09-26
Payer: MEDICARE

## 2021-09-26 DIAGNOSIS — Z90.49 ACQUIRED ABSENCE OF OTHER SPECIFIED PARTS OF DIGESTIVE TRACT: Chronic | ICD-10-CM

## 2021-09-26 DIAGNOSIS — Z98.84 BARIATRIC SURGERY STATUS: Chronic | ICD-10-CM

## 2021-09-26 DIAGNOSIS — R60.0 LOCALIZED EDEMA: ICD-10-CM

## 2021-09-26 DIAGNOSIS — Z98.89 OTHER SPECIFIED POSTPROCEDURAL STATES: Chronic | ICD-10-CM

## 2021-09-26 DIAGNOSIS — Z96.653 PRESENCE OF ARTIFICIAL KNEE JOINT, BILATERAL: Chronic | ICD-10-CM

## 2021-09-26 DIAGNOSIS — L02.92 FURUNCLE, UNSPECIFIED: Chronic | ICD-10-CM

## 2021-09-26 DIAGNOSIS — K46.9 UNSPECIFIED ABDOMINAL HERNIA WITHOUT OBSTRUCTION OR GANGRENE: Chronic | ICD-10-CM

## 2021-09-26 DIAGNOSIS — M75.120 COMPLETE ROTATOR CUFF TEAR OR RUPTURE OF UNSPECIFIED SHOULDER, NOT SPECIFIED AS TRAUMATIC: Chronic | ICD-10-CM

## 2021-09-26 DIAGNOSIS — Z41.1 ENCOUNTER FOR COSMETIC SURGERY: Chronic | ICD-10-CM

## 2021-09-26 PROCEDURE — 93970 EXTREMITY STUDY: CPT | Mod: 26

## 2021-09-26 PROCEDURE — 93970 EXTREMITY STUDY: CPT

## 2021-09-27 LAB
ALBUMIN SERPL ELPH-MCNC: 3.7 G/DL
ALP BLD-CCNC: 97 U/L
ALT SERPL-CCNC: 22 U/L
ANION GAP SERPL CALC-SCNC: 15 MMOL/L
AST SERPL-CCNC: 24 U/L
BASOPHILS # BLD AUTO: 0.05 K/UL
BASOPHILS NFR BLD AUTO: 0.6 %
BILIRUB SERPL-MCNC: 0.2 MG/DL
BUN SERPL-MCNC: 19 MG/DL
CALCIUM SERPL-MCNC: 9.1 MG/DL
CHLORIDE SERPL-SCNC: 103 MMOL/L
CO2 SERPL-SCNC: 24 MMOL/L
CREAT SERPL-MCNC: 0.57 MG/DL
EOSINOPHIL # BLD AUTO: 0.13 K/UL
EOSINOPHIL NFR BLD AUTO: 1.7 %
ESTIMATED AVERAGE GLUCOSE: 166 MG/DL
FERRITIN SERPL-MCNC: 43 NG/ML
GLUCOSE SERPL-MCNC: 218 MG/DL
HBA1C MFR BLD HPLC: 7.4 %
HCT VFR BLD CALC: 34.2 %
HGB BLD-MCNC: 10.4 G/DL
IMM GRANULOCYTES NFR BLD AUTO: 0.9 %
IRON SATN MFR SERPL: 21 %
IRON SERPL-MCNC: 74 UG/DL
LYMPHOCYTES # BLD AUTO: 1.49 K/UL
LYMPHOCYTES NFR BLD AUTO: 19.1 %
MAGNESIUM SERPL-MCNC: 1.6 MG/DL
MAN DIFF?: NORMAL
MCHC RBC-ENTMCNC: 30.1 PG
MCHC RBC-ENTMCNC: 30.4 GM/DL
MCV RBC AUTO: 99.1 FL
MONOCYTES # BLD AUTO: 0.53 K/UL
MONOCYTES NFR BLD AUTO: 6.8 %
NEUTROPHILS # BLD AUTO: 5.53 K/UL
NEUTROPHILS NFR BLD AUTO: 70.9 %
PLATELET # BLD AUTO: 178 K/UL
POTASSIUM SERPL-SCNC: 3.9 MMOL/L
PROT SERPL-MCNC: 6.1 G/DL
RBC # BLD: 3.45 M/UL
RBC # FLD: 14.1 %
SODIUM SERPL-SCNC: 143 MMOL/L
T4 FREE SERPL-MCNC: 1.2 NG/DL
TIBC SERPL-MCNC: 362 UG/DL
TSH SERPL-ACNC: 3.24 UIU/ML
UIBC SERPL-MCNC: 288 UG/DL
WBC # FLD AUTO: 7.8 K/UL

## 2021-09-28 ENCOUNTER — APPOINTMENT (OUTPATIENT)
Dept: UROLOGY | Facility: CLINIC | Age: 66
End: 2021-09-28
Payer: MEDICARE

## 2021-09-28 VITALS — TEMPERATURE: 97.8 F

## 2021-09-28 DIAGNOSIS — N95.2 POSTMENOPAUSAL ATROPHIC VAGINITIS: ICD-10-CM

## 2021-09-28 PROCEDURE — 99214 OFFICE O/P EST MOD 30 MIN: CPT

## 2021-09-28 NOTE — PHYSICAL EXAM
[Normal Appearance] : normal appearance [General Appearance - In No Acute Distress] : no acute distress [FreeTextEntry1] : normal peripheral circulation [Skin Color & Pigmentation] : normal skin color and pigmentation [] : no respiratory distress [Oriented To Time, Place, And Person] : oriented to person, place, and time [Affect] : the affect was normal [Mood] : the mood was normal [Not Anxious] : not anxious

## 2021-09-28 NOTE — HISTORY OF PRESENT ILLNESS
[FreeTextEntry1] : 65 yo female presents for gross hematuria. \par Was using Estrogen, ran out, was better, again Hematuria and has off and on tingling. \par Was told by PCP has Anemia. \par With Myrbetriq 50 mg less frequency, every 4-5 hours otherwise every 2 hours, less urinary incontinence. \par \par Had seen Gynecology and has been ruled out for Gynecology issues. \par \par Status post Cystoscopy and bladder biopsy with fulguration done on 4/12/21 at [facility].\par Pathology: patchy inflammation.\par \par Recently seen for gross hematuria. \par Had been having off and on gross hematuria for last 1 year, was being treated with Antibiotics. \par Last urine negative for infection but still had gross hematuria. \par Complained of off and on suprapubic pain and dysuria.  Has chronic back pain. \par Urinates every 3  hours or so during the day and night every 1-2 hours or so. \par Denied nausea, vomiting, fever, chills or rigors. \par Past smoker: < 1PPD for 20 years, quit 40 years ago.\par \par Initially seen for Ureteral stone.  \par Passed ureteral stone in 2018, did not follow up.\par Went to [facility] with severe Lt Flank pain, some nausea and vomiting. \par Had CT scan was told has kidney stone and sent home on Flomax, Pain meds. \par Did not see the stone pass. Pain has decreased but still needed pain meds off and on and was taking Flomax BID. Had chronic back pain, few weeks before the ER visit had been having back pain and passed 2 stones. No prior history of Kidney stone. \par Since ER visit had been urinating every hour or so with urgency. Saw some blood 2 days ago. \par \par

## 2021-09-28 NOTE — ASSESSMENT
[FreeTextEntry1] : Gross hematuria:\par Recurrent urinary tract infections:\par Atrophic vaginitis:\par Will get Urinalysis, Urine culture \par Will start Nitrofurantoin. \par Restart Estrogen. \par \par Return to office in 3 months or sooner if any issues. \par If persistent hematuria will consider Cystoscopy.

## 2021-09-29 LAB
APPEARANCE: CLEAR
BACTERIA: NEGATIVE
BILIRUBIN URINE: NEGATIVE
BLOOD URINE: ABNORMAL
COLOR: YELLOW
GLUCOSE QUALITATIVE U: NEGATIVE
HYALINE CASTS: 0 /LPF
KETONES URINE: NEGATIVE
LEUKOCYTE ESTERASE URINE: NEGATIVE
MICROSCOPIC-UA: NORMAL
NITRITE URINE: NEGATIVE
PH URINE: 6.5
PROTEIN URINE: ABNORMAL
RED BLOOD CELLS URINE: 31 /HPF
SPECIFIC GRAVITY URINE: 1.02
SQUAMOUS EPITHELIAL CELLS: 3 /HPF
URINE COMMENTS: NORMAL
UROBILINOGEN URINE: NORMAL
WHITE BLOOD CELLS URINE: 9 /HPF

## 2021-09-30 LAB — BACTERIA UR CULT: NORMAL

## 2021-10-05 ENCOUNTER — APPOINTMENT (OUTPATIENT)
Dept: NEUROLOGY | Facility: CLINIC | Age: 66
End: 2021-10-05
Payer: MEDICARE

## 2021-10-11 ENCOUNTER — APPOINTMENT (OUTPATIENT)
Dept: FAMILY MEDICINE | Facility: CLINIC | Age: 66
End: 2021-10-11
Payer: MEDICARE

## 2021-10-11 PROCEDURE — 90686 IIV4 VACC NO PRSV 0.5 ML IM: CPT

## 2021-10-11 PROCEDURE — G0008: CPT

## 2021-11-03 ENCOUNTER — RX RENEWAL (OUTPATIENT)
Age: 66
End: 2021-11-03

## 2021-11-08 ENCOUNTER — RX RENEWAL (OUTPATIENT)
Age: 66
End: 2021-11-08

## 2021-11-08 ENCOUNTER — APPOINTMENT (OUTPATIENT)
Dept: DERMATOLOGY | Facility: CLINIC | Age: 66
End: 2021-11-08
Payer: MEDICARE

## 2021-11-08 PROCEDURE — 99213 OFFICE O/P EST LOW 20 MIN: CPT

## 2021-11-08 NOTE — PHYSICAL EXAM
[Alert] : alert [Oriented x 3] : ~L oriented x 3 [Well Nourished] : well nourished [FreeTextEntry3] : Patient wearing a facemask\par \par Scalp: Mild diffuse thinning\par 1-3 hairs removed with gentle toe, especially on the left side\par Fingernails: Mostly within normal limits with one horizontal groove present in the left thumbnail

## 2021-11-08 NOTE — HISTORY OF PRESENT ILLNESS
[FreeTextEntry1] : Hair loss and nail dystrophy [de-identified] : Followup visit for 66-year-old white female first seen by me on July 13, 2021, and then four-month history of marked hair loss and dystrophy of the nails.\par Diagnosed as a telogen effluvium and Beau's lines, both probably secondary to prior COVID-19 infection and pneumonia incurred in January and February of 2021.\par No treatment given as both condition should spontaneously resolve over time.\par \par Patient presents today for reevaluation.\par \par Nails have improved. Hair loss persists

## 2021-11-16 ENCOUNTER — APPOINTMENT (OUTPATIENT)
Dept: FAMILY MEDICINE | Facility: CLINIC | Age: 66
End: 2021-11-16
Payer: MEDICARE

## 2021-11-16 DIAGNOSIS — J01.90 ACUTE SINUSITIS, UNSPECIFIED: ICD-10-CM

## 2021-11-16 DIAGNOSIS — Z87.09 PERSONAL HISTORY OF OTHER DISEASES OF THE RESPIRATORY SYSTEM: ICD-10-CM

## 2021-11-16 PROCEDURE — 99442: CPT | Mod: 95

## 2021-11-16 NOTE — HISTORY OF PRESENT ILLNESS
[Home] : at home, [unfilled] , at the time of the visit. [Medical Office: ([facility])___] : at the medical office located in  [Spouse] : spouse [FreeTextEntry8] : recent onset of cough congeston pnd sinur pressure with discharge no fever using her inhaler

## 2021-12-08 ENCOUNTER — APPOINTMENT (OUTPATIENT)
Dept: FAMILY MEDICINE | Facility: CLINIC | Age: 66
End: 2021-12-08
Payer: MEDICARE

## 2021-12-08 VITALS
WEIGHT: 205 LBS | TEMPERATURE: 97.5 F | HEART RATE: 111 BPM | DIASTOLIC BLOOD PRESSURE: 80 MMHG | SYSTOLIC BLOOD PRESSURE: 124 MMHG | OXYGEN SATURATION: 95 % | BODY MASS INDEX: 35 KG/M2 | HEIGHT: 64 IN

## 2021-12-08 LAB
BILIRUB UR QL STRIP: NEGATIVE
GLUCOSE UR-MCNC: NEGATIVE
HCG UR QL: 0.2 EU/DL
HGB UR QL STRIP.AUTO: NORMAL
KETONES UR-MCNC: NEGATIVE
LEUKOCYTE ESTERASE UR QL STRIP: NORMAL
NITRITE UR QL STRIP: POSITIVE
PH UR STRIP: 7.5
PROT UR STRIP-MCNC: 100
SP GR UR STRIP: 1.02

## 2021-12-08 PROCEDURE — 81003 URINALYSIS AUTO W/O SCOPE: CPT | Mod: QW

## 2021-12-08 PROCEDURE — 99213 OFFICE O/P EST LOW 20 MIN: CPT | Mod: 25

## 2021-12-08 NOTE — ASSESSMENT
[FreeTextEntry1] : macrobid for for UTI\par UA + for UTI\par sent for culture\par advised to f./u with urology for maintenance med to prevent further utis and further eval. \par

## 2021-12-08 NOTE — REVIEW OF SYSTEMS
[Dysuria] : dysuria [Nocturia] : nocturia [Hematuria] : hematuria [Frequency] : frequency [Vaginal Discharge] : vaginal discharge [Negative] : Musculoskeletal

## 2021-12-08 NOTE — HISTORY OF PRESENT ILLNESS
[FreeTextEntry8] : Pt presenting for 5 days of urinary frequency, burning, hematuria and odor. \par Hx of multiple UTI's in the past \par seen by urology, did not followup \par has been using cranberry extract to help, symptoms worsening\par no fevers, chills or back pain,

## 2021-12-09 ENCOUNTER — RX RENEWAL (OUTPATIENT)
Age: 66
End: 2021-12-09

## 2021-12-10 ENCOUNTER — APPOINTMENT (OUTPATIENT)
Dept: NEUROLOGY | Facility: CLINIC | Age: 66
End: 2021-12-10
Payer: MEDICARE

## 2021-12-10 VITALS
SYSTOLIC BLOOD PRESSURE: 116 MMHG | HEIGHT: 64 IN | WEIGHT: 205 LBS | BODY MASS INDEX: 35 KG/M2 | HEART RATE: 92 BPM | DIASTOLIC BLOOD PRESSURE: 72 MMHG | TEMPERATURE: 97.8 F

## 2021-12-10 DIAGNOSIS — M79.605 PAIN IN RIGHT LEG: ICD-10-CM

## 2021-12-10 DIAGNOSIS — M79.604 PAIN IN RIGHT LEG: ICD-10-CM

## 2021-12-10 DIAGNOSIS — M79.10 MYALGIA, UNSPECIFIED SITE: ICD-10-CM

## 2021-12-10 PROCEDURE — 99213 OFFICE O/P EST LOW 20 MIN: CPT

## 2021-12-10 RX ORDER — NITROFURANTOIN (MONOHYDRATE/MACROCRYSTALS) 25; 75 MG/1; MG/1
100 CAPSULE ORAL TWICE DAILY
Qty: 14 | Refills: 0 | Status: DISCONTINUED | COMMUNITY
Start: 2021-09-28 | End: 2021-12-10

## 2021-12-10 RX ORDER — CYCLOBENZAPRINE HYDROCHLORIDE 5 MG/1
5 TABLET, FILM COATED ORAL
Qty: 30 | Refills: 0 | Status: DISCONTINUED | COMMUNITY
Start: 2020-10-19 | End: 2021-12-10

## 2021-12-10 RX ORDER — ASPIRIN 325 MG
TABLET ORAL
Refills: 0 | Status: DISCONTINUED | COMMUNITY
End: 2021-12-10

## 2021-12-10 RX ORDER — OXYCODONE 10 MG/1
10 TABLET ORAL EVERY 4 HOURS
Qty: 42 | Refills: 0 | Status: DISCONTINUED | COMMUNITY
Start: 2019-10-21 | End: 2021-12-10

## 2021-12-10 RX ORDER — MIRABEGRON 50 MG/1
50 TABLET, FILM COATED, EXTENDED RELEASE ORAL
Qty: 90 | Refills: 2 | Status: DISCONTINUED | COMMUNITY
Start: 2021-07-27 | End: 2021-12-10

## 2021-12-10 RX ORDER — FLUCONAZOLE 150 MG/1
150 TABLET ORAL
Qty: 2 | Refills: 0 | Status: DISCONTINUED | COMMUNITY
Start: 2019-10-31 | End: 2021-12-10

## 2021-12-10 RX ORDER — AZITHROMYCIN 250 MG/1
250 TABLET, FILM COATED ORAL
Qty: 1 | Refills: 0 | Status: DISCONTINUED | COMMUNITY
Start: 2021-11-16 | End: 2021-12-10

## 2021-12-10 RX ORDER — METHYLPREDNISOLONE 4 MG/1
4 TABLET ORAL
Qty: 1 | Refills: 0 | Status: DISCONTINUED | COMMUNITY
Start: 2021-11-16 | End: 2021-12-10

## 2021-12-10 RX ORDER — CLONAZEPAM 0.5 MG/1
0.5 TABLET ORAL
Qty: 30 | Refills: 0 | Status: DISCONTINUED | COMMUNITY
Start: 2021-02-19 | End: 2021-12-10

## 2021-12-10 RX ORDER — HYDROCORTISONE ACETATE 25 MG/1
25 SUPPOSITORY RECTAL TWICE DAILY
Qty: 1 | Refills: 1 | Status: DISCONTINUED | COMMUNITY
Start: 2021-01-19 | End: 2021-12-10

## 2021-12-10 RX ORDER — KETOTIFEN FUMARATE 0.25 MG/ML
0.03 SOLUTION/ DROPS OPHTHALMIC
Qty: 1 | Refills: 0 | Status: DISCONTINUED | COMMUNITY
Start: 2020-12-08 | End: 2021-12-10

## 2021-12-10 NOTE — PHYSICAL EXAM
[FreeTextEntry1] : Examination:\par Constitutional: normal, no apparent distress\par Eyes: normal conjunctiva b/l, no ptosis, visual fields full\par Respiratory: no respiratory distress, normal effort, normal auscultation\par Cardiovascular: normal rate, rhythm, no murmurs\par Neck: supple, no masses\par Vascular: carotids normal\par Skin: normal color, no rashes\par Psych: normal mood, affect\par \par Neurological:\par Memory: normal memory, oriented to person, place, time\par Language intact/no aphasia\par Cranial Nerves: II-XII intact, Pupils equally round and reactive to light, ocular muscles/movements intact, no ptosis, no facial weakness, tongue protrudes normally in the midline, \par Motor: normal tone, no pronator drift, full strength in all four extremities in the proximal and distal muscle groups\par Coordination: Fine motor movements intact, rapid alternating movements intact, finger to nose intact bilaterally\par Sensory: intact to light touch\par DTRs: symmetric, normal\par Gait: poor foot clearance from floor but does not appear magnetic such as in NPH\par

## 2021-12-10 NOTE — DATA REVIEWED
[de-identified] : MRI brain without contrast 1/8/20: \par No evidence of acute infarction, hemorrhage or hydrocephalus.\par Minimal non-acute small vessel ischemic change.\par \par MRI brain with and without contrast and seizure protocol 9/14/20:\par \par No hydrocephalus, mass effect, acute intracranial hemorrhage, vasogenic edema, or acute territorial infarct.\par \par Tiny nonspecific focus of increased T2 and FLAIR hyperintense signal involving the superior right insular cortex (6-20). This could represent a small chronic infarct. Cortical dysplasia is considered much less likely.\par \par No abnormal parenchymal or leptomeningeal enhancement.\par \par No evidence for mesial temporal sclerosis or gray matter heterotopia.\par \par MRI brain and cervical spine 8/11/21:\par \par BRAIN:\par No acute intracranial findings.\par \par CERVICAL SPINE:\par No acute cord abnormalities.\par Multilevel spondylosis, not significantly changed from 6/13/2019.\par  [de-identified] : Routine EEG 8/19/20: \par This was an abnormal EEG study in the awake and drowsy states due to the presence of:\par -Bilateral independent temporal sharp waves, left more frequent than right, with phase reversal at F7 and F8.\par \par EEG Impression/Clinical Correlate:\par The findings are consistent with a risk for focal onset seizures.\par  Consider a repeat study if clinically indicated. \par \par 24 hour EEG 8/19/20-8/20/20:\par \par EEG Summary/Classification:\par This was an abnormal EEG study in the awake, drowsy, and asleep states due to the presence of bilateral independent temporal sharp waves with phase reversal at F7 and F8, seen more frequently on the left.\par \par EEG Impression/Clinical Correlate:\par The findings are suggestive of an risk for focal onset seizures. \par \par EEG 3/10/21:\par B/L Independent temporal sharp waves [de-identified] : Blood tests 6/22/20:\par TSH 3.1, vitamin D 27.4\par Blood tests 1/8/20: B12 667\par HbA1C 7.6

## 2021-12-10 NOTE — DISCUSSION/SUMMARY
[FreeTextEntry1] : Ms. García is a 66 year old woman who initially presented for evaluation of frequent falls.\par She also reports having concerns about her memory.\par \par She has had exacerbation of symptoms since infection with covid-19.\par \par Frequent Falls\par -Falls had improved and now have recurred.\par -She feels that falls are now related to not lifting her feet properly.\par -No suggestion of NPH on previous MRI.\par -Will check MRI lumbar spine, labs and EMG of lower extremities\par \par Possible seizures\par -EEG showed some b/l temporal sharp waves. \par -She would like to reduce  medications\par -Repeat 24 hour EEG.\par -Continue oxcarbazepine 450 mg BID for now.\par \par Dizziness/spotty vision\par -Possibly related to low BP\par -Advised to increase hydration\par \par \par Memory Impairment\par -She scored 21/30 on the Red Cognitive Assessment initially with delayed recall being the area of worst performance.\par -Vitamin B12 and TSH levels normal.\par -Now with worsening of memory since covid, possibly anxiety component and brain fog\par -Will work with therapist\par -Participate in mentally stimulating activities. Encouraged reading, puzzles, etc.\par \par \par f/u after above testing, sooner if needed.\par

## 2021-12-10 NOTE — HISTORY OF PRESENT ILLNESS
[FreeTextEntry1] : Last visit 8/3/21.\par She has been falling again.\par She sometimes feels lightheaded.\par She says that sometimes she feels as if her feet are not keeping pace with her body and she falls forward.\par Sometimes her legs feel weak. She has difficulty on the stairs.\par She believes that this is a different cause of falls in the past.\par She has pain in her legs.\par \par She has urinary frequency but denies incontinence.\par \par She finds herself stuttering again.\par \par She has not had any episodes suggestive of seizures.

## 2021-12-14 ENCOUNTER — TRANSCRIPTION ENCOUNTER (OUTPATIENT)
Age: 66
End: 2021-12-14

## 2021-12-14 LAB — BACTERIA UR CULT: ABNORMAL

## 2021-12-16 NOTE — H&P PST ADULT - HISTORY OF PRESENT ILLNESS
Chart(s)/Patient/Mother/Father 65 year old female with bladder tumor c/o hematuria denies dysuria she presents to PST for planned cystoscopy TURBT

## 2021-12-17 ENCOUNTER — NON-APPOINTMENT (OUTPATIENT)
Age: 66
End: 2021-12-17

## 2021-12-21 ENCOUNTER — APPOINTMENT (OUTPATIENT)
Dept: UROLOGY | Facility: CLINIC | Age: 66
End: 2021-12-21
Payer: MEDICARE

## 2021-12-21 VITALS — TEMPERATURE: 98 F

## 2021-12-21 PROCEDURE — 99214 OFFICE O/P EST MOD 30 MIN: CPT

## 2021-12-22 LAB
CK SERPL-CCNC: 56 U/L
CRP SERPL-MCNC: 4 MG/L
ERYTHROCYTE [SEDIMENTATION RATE] IN BLOOD BY WESTERGREN METHOD: 18 MM/HR
RHEUMATOID FACT SER QL: <10 IU/ML

## 2021-12-23 LAB
ALDOLASE SERPL-CCNC: 4.5 U/L
DSDNA AB SER-ACNC: <12 IU/ML
ENA SS-A AB SER IA-ACNC: <0.2 AL
ENA SS-B AB SER IA-ACNC: <0.2 AL

## 2021-12-24 LAB — ANA SER IF-ACNC: NEGATIVE

## 2021-12-28 ENCOUNTER — APPOINTMENT (OUTPATIENT)
Dept: FAMILY MEDICINE | Facility: CLINIC | Age: 66
End: 2021-12-28
Payer: MEDICARE

## 2021-12-28 ENCOUNTER — APPOINTMENT (OUTPATIENT)
Dept: FAMILY MEDICINE | Facility: CLINIC | Age: 66
End: 2021-12-28

## 2021-12-28 DIAGNOSIS — U07.1 COVID-19: ICD-10-CM

## 2021-12-28 PROCEDURE — 99442: CPT | Mod: CS,95

## 2021-12-29 ENCOUNTER — APPOINTMENT (OUTPATIENT)
Dept: FAMILY MEDICINE | Facility: CLINIC | Age: 66
End: 2021-12-29

## 2021-12-30 ENCOUNTER — APPOINTMENT (OUTPATIENT)
Dept: FAMILY MEDICINE | Facility: CLINIC | Age: 66
End: 2021-12-30
Payer: MEDICARE

## 2021-12-30 PROCEDURE — 99211 OFF/OP EST MAY X REQ PHY/QHP: CPT

## 2022-01-02 NOTE — PHYSICAL EXAM
[Normal Appearance] : normal appearance [General Appearance - In No Acute Distress] : no acute distress [Skin Color & Pigmentation] : normal skin color and pigmentation [FreeTextEntry1] : normal peripheral circulation [] : no respiratory distress [Oriented To Time, Place, And Person] : oriented to person, place, and time [Affect] : the affect was normal [Mood] : the mood was normal [Not Anxious] : not anxious

## 2022-01-02 NOTE — ASSESSMENT
[FreeTextEntry1] : Recurrent urinary tract infections:\par Gross hematuria:\par Overactive Bladder:\par Continue Nitrofurantoin, will start prophylactic dose after completion. \par Recommended to take Probiotics. \par Recommended to use Estrogen regularly. \par Recommended to use Myrbetriq 50 mg daily. \par \par Return to office on 1/4/22, will do catheterized specimen.

## 2022-01-02 NOTE — HISTORY OF PRESENT ILLNESS
[FreeTextEntry1] : 67 yo female presents for follow up. \par Keeps getting urinary tract infections: E.coli ESBL. gross hematuria, dysuria and frequency. \par On Nitrofurantoin since last 3 days. \par Was using Estrogen off and on.\par When has infection urination becomes more frequent. \par In the past with Myrbetriq 50 mg less frequency, every 3-4 hours and less urinary incontinence. \par \par Had seen Gynecology and has been ruled out for Gynecology issues. \par \par Status post Cystoscopy and bladder biopsy with fulguration done on 4/12/21 at [facility].\par Pathology: patchy inflammation.\par \par Recently seen for gross hematuria. \par Had been having off and on gross hematuria for last 1 year, was being treated with Antibiotics. \par Last urine negative for infection but still had gross hematuria. \par Complained of off and on suprapubic pain and dysuria.  Has chronic back pain. \par Urinates every 3  hours or so during the day and night every 1-2 hours or so. \par Denied nausea, vomiting, fever, chills or rigors. \par Past smoker: < 1PPD for 20 years, quit 40 years ago.\par \par Initially seen for Ureteral stone.  \par Passed ureteral stone in 2018, did not follow up.\par Went to [facility] with severe Lt Flank pain, some nausea and vomiting. \par Had CT scan was told has kidney stone and sent home on Flomax, Pain meds. \par Did not see the stone pass. Pain has decreased but still needed pain meds off and on and was taking Flomax BID. Had chronic back pain, few weeks before the ER visit had been having back pain and passed 2 stones. No prior history of Kidney stone. \par Since ER visit had been urinating every hour or so with urgency. Saw some blood 2 days ago. \par \par

## 2022-01-03 ENCOUNTER — RX RENEWAL (OUTPATIENT)
Age: 67
End: 2022-01-03

## 2022-01-04 LAB
INFLUENZA A RESULT: NOT DETECTED
INFLUENZA B RESULT: NOT DETECTED
RESP SYN VIRUS RESULT: NOT DETECTED
SARS-COV-2 RESULT: NOT DETECTED

## 2022-01-12 ENCOUNTER — APPOINTMENT (OUTPATIENT)
Dept: MRI IMAGING | Facility: CLINIC | Age: 67
End: 2022-01-12
Payer: MEDICARE

## 2022-01-12 ENCOUNTER — OUTPATIENT (OUTPATIENT)
Dept: OUTPATIENT SERVICES | Facility: HOSPITAL | Age: 67
LOS: 1 days | End: 2022-01-12

## 2022-01-12 DIAGNOSIS — M54.9 DORSALGIA, UNSPECIFIED: ICD-10-CM

## 2022-01-12 DIAGNOSIS — Z98.89 OTHER SPECIFIED POSTPROCEDURAL STATES: Chronic | ICD-10-CM

## 2022-01-12 DIAGNOSIS — K46.9 UNSPECIFIED ABDOMINAL HERNIA WITHOUT OBSTRUCTION OR GANGRENE: Chronic | ICD-10-CM

## 2022-01-12 DIAGNOSIS — M75.120 COMPLETE ROTATOR CUFF TEAR OR RUPTURE OF UNSPECIFIED SHOULDER, NOT SPECIFIED AS TRAUMATIC: Chronic | ICD-10-CM

## 2022-01-12 DIAGNOSIS — Z96.653 PRESENCE OF ARTIFICIAL KNEE JOINT, BILATERAL: Chronic | ICD-10-CM

## 2022-01-12 DIAGNOSIS — Z41.1 ENCOUNTER FOR COSMETIC SURGERY: Chronic | ICD-10-CM

## 2022-01-12 DIAGNOSIS — Z98.84 BARIATRIC SURGERY STATUS: Chronic | ICD-10-CM

## 2022-01-12 DIAGNOSIS — L02.92 FURUNCLE, UNSPECIFIED: Chronic | ICD-10-CM

## 2022-01-12 DIAGNOSIS — Z90.49 ACQUIRED ABSENCE OF OTHER SPECIFIED PARTS OF DIGESTIVE TRACT: Chronic | ICD-10-CM

## 2022-01-12 PROCEDURE — 72148 MRI LUMBAR SPINE W/O DYE: CPT | Mod: 26

## 2022-01-13 ENCOUNTER — NON-APPOINTMENT (OUTPATIENT)
Age: 67
End: 2022-01-13

## 2022-01-18 ENCOUNTER — APPOINTMENT (OUTPATIENT)
Dept: UROLOGY | Facility: CLINIC | Age: 67
End: 2022-01-18
Payer: MEDICARE

## 2022-01-18 VITALS
OXYGEN SATURATION: 93 % | RESPIRATION RATE: 86 BRPM | SYSTOLIC BLOOD PRESSURE: 114 MMHG | DIASTOLIC BLOOD PRESSURE: 78 MMHG

## 2022-01-18 PROCEDURE — 51701 INSERT BLADDER CATHETER: CPT

## 2022-01-19 LAB
APPEARANCE: ABNORMAL
BACTERIA: ABNORMAL
BILIRUBIN URINE: NEGATIVE
BLOOD URINE: NORMAL
COLOR: YELLOW
GLUCOSE QUALITATIVE U: ABNORMAL
HYALINE CASTS: 0 /LPF
KETONES URINE: NEGATIVE
LEUKOCYTE ESTERASE URINE: ABNORMAL
MICROSCOPIC-UA: NORMAL
NITRITE URINE: POSITIVE
PH URINE: 6
PROTEIN URINE: ABNORMAL
RED BLOOD CELLS URINE: 7 /HPF
SPECIFIC GRAVITY URINE: >=1.03
SQUAMOUS EPITHELIAL CELLS: 0 /HPF
URINE COMMENTS: NORMAL
UROBILINOGEN URINE: NORMAL
WHITE BLOOD CELLS URINE: >720 /HPF

## 2022-01-20 ENCOUNTER — APPOINTMENT (OUTPATIENT)
Dept: NEUROLOGY | Facility: CLINIC | Age: 67
End: 2022-01-20
Payer: MEDICARE

## 2022-01-20 PROCEDURE — 95816 EEG AWAKE AND DROWSY: CPT

## 2022-01-20 RX ORDER — BLOOD-GLUCOSE SENSOR
EACH MISCELLANEOUS
Qty: 1 | Refills: 3 | Status: DISCONTINUED | COMMUNITY
Start: 2020-11-10 | End: 2022-01-20

## 2022-01-20 RX ORDER — BLOOD-GLUCOSE,RECEIVER,CONT
EACH MISCELLANEOUS
Qty: 1 | Refills: 0 | Status: DISCONTINUED | COMMUNITY
Start: 2020-11-10 | End: 2022-01-20

## 2022-01-20 RX ORDER — NITROFURANTOIN MACROCRYSTALS 100 MG/1
100 CAPSULE ORAL
Qty: 14 | Refills: 1 | Status: DISCONTINUED | COMMUNITY
Start: 2021-07-30 | End: 2022-01-20

## 2022-01-20 RX ORDER — FLUTICASONE PROPIONATE 50 UG/1
50 SPRAY, METERED NASAL DAILY
Qty: 1 | Refills: 0 | Status: DISCONTINUED | COMMUNITY
Start: 2019-05-13 | End: 2022-01-20

## 2022-01-20 RX ORDER — MISOPROSTOL 200 UG/1
200 TABLET ORAL
Qty: 2 | Refills: 0 | Status: DISCONTINUED | COMMUNITY
Start: 2020-12-04 | End: 2022-01-20

## 2022-01-20 RX ORDER — BLOOD-GLUCOSE TRANSMITTER
EACH MISCELLANEOUS
Qty: 1 | Refills: 0 | Status: DISCONTINUED | COMMUNITY
Start: 2020-11-10 | End: 2022-01-20

## 2022-01-20 RX ORDER — SULFAMETHOXAZOLE AND TRIMETHOPRIM 800; 160 MG/1; MG/1
800-160 TABLET ORAL TWICE DAILY
Qty: 14 | Refills: 0 | Status: DISCONTINUED | COMMUNITY
Start: 2021-12-16 | End: 2022-01-20

## 2022-01-20 RX ORDER — ONDANSETRON 4 MG/1
4 TABLET, ORALLY DISINTEGRATING ORAL
Qty: 30 | Refills: 0 | Status: DISCONTINUED | COMMUNITY
Start: 2021-02-15 | End: 2022-01-20

## 2022-01-20 RX ORDER — PANTOPRAZOLE 40 MG/1
40 TABLET, DELAYED RELEASE ORAL
Qty: 180 | Refills: 0 | Status: DISCONTINUED | COMMUNITY
Start: 2018-01-12 | End: 2022-01-20

## 2022-01-20 RX ORDER — FUROSEMIDE 20 MG/1
20 TABLET ORAL
Qty: 30 | Refills: 0 | Status: DISCONTINUED | COMMUNITY
Start: 2018-10-12 | End: 2022-01-20

## 2022-01-20 RX ORDER — NITROFURANTOIN MACROCRYSTALS 50 MG/1
50 CAPSULE ORAL
Qty: 30 | Refills: 5 | Status: DISCONTINUED | COMMUNITY
Start: 2021-12-21 | End: 2022-01-20

## 2022-01-20 RX ORDER — FLUTICASONE PROPIONATE AND SALMETEROL 100; 50 UG/1; UG/1
100-50 POWDER RESPIRATORY (INHALATION)
Qty: 1 | Refills: 1 | Status: DISCONTINUED | COMMUNITY
Start: 2021-03-18 | End: 2022-01-20

## 2022-01-20 RX ORDER — PHENAZOPYRIDINE 200 MG/1
200 TABLET, FILM COATED ORAL
Qty: 15 | Refills: 2 | Status: DISCONTINUED | COMMUNITY
Start: 2021-07-26 | End: 2022-01-20

## 2022-01-21 ENCOUNTER — APPOINTMENT (OUTPATIENT)
Dept: NEUROLOGY | Facility: CLINIC | Age: 67
End: 2022-01-21
Payer: MEDICARE

## 2022-01-21 PROCEDURE — 95708 EEG WO VID EA 12-26HR UNMNTR: CPT

## 2022-01-21 PROCEDURE — 95719 EEG PHYS/QHP EA INCR W/O VID: CPT

## 2022-01-24 ENCOUNTER — NON-APPOINTMENT (OUTPATIENT)
Age: 67
End: 2022-01-24

## 2022-01-25 ENCOUNTER — NON-APPOINTMENT (OUTPATIENT)
Age: 67
End: 2022-01-25

## 2022-01-25 DIAGNOSIS — N39.0 URINARY TRACT INFECTION, SITE NOT SPECIFIED: ICD-10-CM

## 2022-01-25 LAB — BACTERIA UR CULT: ABNORMAL

## 2022-01-28 ENCOUNTER — APPOINTMENT (OUTPATIENT)
Dept: NEUROLOGY | Facility: CLINIC | Age: 67
End: 2022-01-28

## 2022-02-03 ENCOUNTER — APPOINTMENT (OUTPATIENT)
Dept: FAMILY MEDICINE | Facility: CLINIC | Age: 67
End: 2022-02-03
Payer: MEDICARE

## 2022-02-03 ENCOUNTER — NON-APPOINTMENT (OUTPATIENT)
Age: 67
End: 2022-02-03

## 2022-02-03 ENCOUNTER — RX RENEWAL (OUTPATIENT)
Age: 67
End: 2022-02-03

## 2022-02-03 VITALS
OXYGEN SATURATION: 96 % | RESPIRATION RATE: 16 BRPM | DIASTOLIC BLOOD PRESSURE: 84 MMHG | SYSTOLIC BLOOD PRESSURE: 140 MMHG | HEIGHT: 64 IN | WEIGHT: 205 LBS | BODY MASS INDEX: 35 KG/M2 | HEART RATE: 89 BPM | TEMPERATURE: 98.1 F

## 2022-02-03 VITALS — DIASTOLIC BLOOD PRESSURE: 82 MMHG | SYSTOLIC BLOOD PRESSURE: 120 MMHG

## 2022-02-03 DIAGNOSIS — M62.08 SEPARATION OF MUSCLE (NONTRAUMATIC), OTHER SITE: ICD-10-CM

## 2022-02-03 DIAGNOSIS — H26.9 UNSPECIFIED CATARACT: ICD-10-CM

## 2022-02-03 PROCEDURE — 36415 COLL VENOUS BLD VENIPUNCTURE: CPT

## 2022-02-03 PROCEDURE — 99214 OFFICE O/P EST MOD 30 MIN: CPT | Mod: 25

## 2022-02-04 ENCOUNTER — NON-APPOINTMENT (OUTPATIENT)
Age: 67
End: 2022-02-04

## 2022-02-04 ENCOUNTER — APPOINTMENT (OUTPATIENT)
Dept: UROLOGY | Facility: CLINIC | Age: 67
End: 2022-02-04
Payer: MEDICARE

## 2022-02-04 VITALS
BODY MASS INDEX: 34.83 KG/M2 | RESPIRATION RATE: 20 BRPM | SYSTOLIC BLOOD PRESSURE: 117 MMHG | OXYGEN SATURATION: 97 % | HEART RATE: 106 BPM | DIASTOLIC BLOOD PRESSURE: 74 MMHG | HEIGHT: 64 IN | TEMPERATURE: 98 F | WEIGHT: 204 LBS

## 2022-02-04 PROCEDURE — 51701 INSERT BLADDER CATHETER: CPT

## 2022-02-04 PROCEDURE — 99214 OFFICE O/P EST MOD 30 MIN: CPT | Mod: 25

## 2022-02-04 NOTE — PHYSICAL EXAM
[Normal Appearance] : normal appearance [General Appearance - In No Acute Distress] : no acute distress [] : no respiratory distress [Oriented To Time, Place, And Person] : oriented to person, place, and time [Affect] : the affect was normal [Mood] : the mood was normal [Not Anxious] : not anxious [Normal Station and Gait] : the gait and station were normal for the patient's age

## 2022-02-04 NOTE — HISTORY OF PRESENT ILLNESS
[FreeTextEntry1] : 66 year old female presents for urinary tract infection. \par Using Estrogen daily, urinating every 1-2 hours and has off and on hematuria. \par Ran out of Myrbetriq. \par \par Catheterized specimen(1/18/22): ESBL E coli. Resistant to Nitrofurantoin. Treated with Bactrim. \par \par In the past with Myrbetriq 50 mg less frequency, every 3-4 hours and less urinary incontinence. \par \par Had seen Gynecology and has been ruled out for Gynecology issues. \par \par Status post Cystoscopy and bladder biopsy with fulguration done on 4/12/21 at [facility].\par Pathology: patchy inflammation.\par \par Recently seen for gross hematuria. \par Had been having off and on gross hematuria for last 1 year, was being treated with Antibiotics. \par Last urine negative for infection but still had gross hematuria. \par Complained of off and on suprapubic pain and dysuria.  Has chronic back pain. \par Urinates every 3  hours or so during the day and night every 1-2 hours or so. \par Denied nausea, vomiting, fever, chills or rigors. \par Past smoker: < 1PPD for 20 years, quit 40 years ago.\par \par Initially seen for Ureteral stone.  \par Passed ureteral stone in 2018, did not follow up.\par Went to [facility] with severe Lt Flank pain, some nausea and vomiting. \par Had CT scan was told has kidney stone and sent home on Flomax, Pain meds. \par Did not see the stone pass. Pain has decreased but still needed pain meds off and on and was taking Flomax BID. Had chronic back pain, few weeks before the ER visit had been having back pain and passed 2 stones. No prior history of Kidney stone. \par Since ER visit had been urinating every hour or so with urgency. Saw some blood 2 days ago. \par \par

## 2022-02-04 NOTE — ASSESSMENT
[FreeTextEntry1] : Recurrent urinary tract infections:\par Atrophic vaginitis:\par Continue Estrogen. \par Discussed concern starting to grow resistant strains. \par Obtained catheterized specimen: Will get Urinalysis and Urine culture. \par Start Bactrim. \par Recommended to go to ED for intravenous Antibiotics if condition worsens. \par \par Overactive Bladder:\par Myrbetriq renewed. \par \par Return to office for scheduled appointment or sooner if any issues.

## 2022-02-05 ENCOUNTER — NON-APPOINTMENT (OUTPATIENT)
Age: 67
End: 2022-02-05

## 2022-02-07 LAB
ALBUMIN SERPL ELPH-MCNC: 3.9 G/DL
ALP BLD-CCNC: 88 U/L
ALT SERPL-CCNC: 18 U/L
ANION GAP SERPL CALC-SCNC: 11 MMOL/L
AST SERPL-CCNC: 17 U/L
BASOPHILS # BLD AUTO: 0.03 K/UL
BASOPHILS NFR BLD AUTO: 0.5 %
BILIRUB SERPL-MCNC: 0.2 MG/DL
BUN SERPL-MCNC: 14 MG/DL
CALCIUM SERPL-MCNC: 9.2 MG/DL
CHLORIDE SERPL-SCNC: 102 MMOL/L
CO2 SERPL-SCNC: 26 MMOL/L
CREAT SERPL-MCNC: 0.59 MG/DL
EOSINOPHIL # BLD AUTO: 0.05 K/UL
EOSINOPHIL NFR BLD AUTO: 0.9 %
ESTIMATED AVERAGE GLUCOSE: 154 MG/DL
FERRITIN SERPL-MCNC: 66 NG/ML
GLUCOSE SERPL-MCNC: 283 MG/DL
HBA1C MFR BLD HPLC: 7 %
HCT VFR BLD CALC: 34.6 %
HGB BLD-MCNC: 10.7 G/DL
IMM GRANULOCYTES NFR BLD AUTO: 1.3 %
IRON SATN MFR SERPL: 10 %
IRON SERPL-MCNC: 34 UG/DL
LYMPHOCYTES # BLD AUTO: 0.77 K/UL
LYMPHOCYTES NFR BLD AUTO: 13.8 %
MAN DIFF?: NORMAL
MCHC RBC-ENTMCNC: 30.9 GM/DL
MCHC RBC-ENTMCNC: 31.4 PG
MCV RBC AUTO: 101.5 FL
MONOCYTES # BLD AUTO: 0.44 K/UL
MONOCYTES NFR BLD AUTO: 7.9 %
NEUTROPHILS # BLD AUTO: 4.23 K/UL
NEUTROPHILS NFR BLD AUTO: 75.6 %
PLATELET # BLD AUTO: 185 K/UL
POTASSIUM SERPL-SCNC: 4.2 MMOL/L
PROT SERPL-MCNC: 6.2 G/DL
RBC # BLD: 3.41 M/UL
RBC # FLD: 13.8 %
SODIUM SERPL-SCNC: 139 MMOL/L
T4 FREE SERPL-MCNC: 1.2 NG/DL
TIBC SERPL-MCNC: 341 UG/DL
TSH SERPL-ACNC: 2.49 UIU/ML
UIBC SERPL-MCNC: 306 UG/DL
WBC # FLD AUTO: 5.59 K/UL

## 2022-02-07 NOTE — ASSESSMENT
[Patient Optimized for Surgery] : Patient optimized for surgery [FreeTextEntry4] : Pt is medically optimized for procedure at this time\par \par Pt to hold glimepiride the morning of surgery. Pt will continue metformin and Januvia day of procedure.\par \par Fax to [facility] \par \par DM, anemia - cont meds. Check labs\par ventral hernia, abd pain - advised weight loss. will consider ozempic

## 2022-02-07 NOTE — RESULTS/DATA
[] : results reviewed [de-identified] : 10.8 chronic anemia stable [de-identified] : EKG- NSR 96 bpm no st t changes [de-identified] : urine cx neg\par a1c 6.3

## 2022-02-07 NOTE — HISTORY OF PRESENT ILLNESS
[No Pertinent Cardiac History] : no history of aortic stenosis, atrial fibrillation, coronary artery disease, recent myocardial infarction, or implantable device/pacemaker [Asthma] : asthma [No Adverse Anesthesia Reaction] : no adverse anesthesia reaction in self or family member [Diabetes] : diabetes [(Patient denies any chest pain, claudication, dyspnea on exertion, orthopnea, palpitations or syncope)] : Patient denies any chest pain, claudication, dyspnea on exertion, orthopnea, palpitations or syncope [Moderate (4-6 METs)] : Moderate (4-6 METs) [Chronic Anticoagulation] : no chronic anticoagulation [Chronic Kidney Disease] : no chronic kidney disease [FreeTextEntry1] : b/l cataract extraction and IOL replacement [FreeTextEntry2] : L eye 2/14/22 R eye 2/21/22 [FreeTextEntry3] : Dr. Braulio Reyes [FreeTextEntry4] : Pt in office for medical clearance. Pt to go for procedure for cataract surgery. Pt denies chest pain, palpitations, dyspnea, fever, chills, nausea, or vomiting.\par Pt has DM, last a1c 7.4 9/21/21. Glucose improved recently usually in 110 fasting.\par Pt has hx of anemia hb 10.4 9/21/21. Due to recheck thyroid as well.\par \par Pt also c/o ventral hernia that was repaired with mesh. Pt reports she has had chronic pain in area and reports re-herniation and bulging in epigastrium. Pain and discomfort has been worse in past 2-3 yrs.

## 2022-02-07 NOTE — PHYSICAL EXAM
[Normal Oropharynx] : the oropharynx was normal [Soft] : abdomen soft [Non-distended] : non-distended [Normal] : affect was normal and insight and judgment were intact [de-identified] : tenderness along midline incision. +rectus diastasis

## 2022-02-08 ENCOUNTER — APPOINTMENT (OUTPATIENT)
Dept: UROLOGY | Facility: CLINIC | Age: 67
End: 2022-02-08
Payer: MEDICARE

## 2022-02-08 ENCOUNTER — APPOINTMENT (OUTPATIENT)
Dept: UROLOGY | Facility: CLINIC | Age: 67
End: 2022-02-08

## 2022-02-08 VITALS — TEMPERATURE: 98 F

## 2022-02-08 LAB
APPEARANCE: ABNORMAL
BACTERIA UR CULT: NORMAL
BACTERIA: NEGATIVE
BILIRUBIN URINE: NEGATIVE
BLOOD URINE: ABNORMAL
COLOR: NORMAL
GLUCOSE QUALITATIVE U: NEGATIVE
HYALINE CASTS: 1 /LPF
KETONES URINE: NEGATIVE
LEUKOCYTE ESTERASE URINE: ABNORMAL
MICROSCOPIC-UA: NORMAL
NITRITE URINE: NEGATIVE
PH URINE: 6
PROTEIN URINE: ABNORMAL
RED BLOOD CELLS URINE: 1 /HPF
SPECIFIC GRAVITY URINE: >=1.03
SQUAMOUS EPITHELIAL CELLS: 1 /HPF
UROBILINOGEN URINE: NORMAL
WHITE BLOOD CELLS URINE: 0 /HPF

## 2022-02-08 PROCEDURE — 99214 OFFICE O/P EST MOD 30 MIN: CPT

## 2022-02-12 ENCOUNTER — APPOINTMENT (OUTPATIENT)
Dept: RADIOLOGY | Facility: CLINIC | Age: 67
End: 2022-02-12
Payer: MEDICARE

## 2022-02-12 ENCOUNTER — OUTPATIENT (OUTPATIENT)
Dept: OUTPATIENT SERVICES | Facility: HOSPITAL | Age: 67
LOS: 1 days | End: 2022-02-12
Payer: MEDICARE

## 2022-02-12 DIAGNOSIS — Z98.89 OTHER SPECIFIED POSTPROCEDURAL STATES: Chronic | ICD-10-CM

## 2022-02-12 DIAGNOSIS — Z90.49 ACQUIRED ABSENCE OF OTHER SPECIFIED PARTS OF DIGESTIVE TRACT: Chronic | ICD-10-CM

## 2022-02-12 DIAGNOSIS — Z41.1 ENCOUNTER FOR COSMETIC SURGERY: Chronic | ICD-10-CM

## 2022-02-12 DIAGNOSIS — K46.9 UNSPECIFIED ABDOMINAL HERNIA WITHOUT OBSTRUCTION OR GANGRENE: Chronic | ICD-10-CM

## 2022-02-12 DIAGNOSIS — L02.92 FURUNCLE, UNSPECIFIED: Chronic | ICD-10-CM

## 2022-02-12 DIAGNOSIS — Z98.84 BARIATRIC SURGERY STATUS: Chronic | ICD-10-CM

## 2022-02-12 DIAGNOSIS — Z96.653 PRESENCE OF ARTIFICIAL KNEE JOINT, BILATERAL: Chronic | ICD-10-CM

## 2022-02-12 DIAGNOSIS — Z00.00 ENCOUNTER FOR GENERAL ADULT MEDICAL EXAMINATION WITHOUT ABNORMAL FINDINGS: ICD-10-CM

## 2022-02-12 DIAGNOSIS — M75.120 COMPLETE ROTATOR CUFF TEAR OR RUPTURE OF UNSPECIFIED SHOULDER, NOT SPECIFIED AS TRAUMATIC: Chronic | ICD-10-CM

## 2022-02-12 PROCEDURE — 77080 DXA BONE DENSITY AXIAL: CPT | Mod: 26

## 2022-02-12 PROCEDURE — 77080 DXA BONE DENSITY AXIAL: CPT

## 2022-02-14 ENCOUNTER — APPOINTMENT (OUTPATIENT)
Dept: CARDIOLOGY | Facility: CLINIC | Age: 67
End: 2022-02-14
Payer: MEDICARE

## 2022-02-14 ENCOUNTER — NON-APPOINTMENT (OUTPATIENT)
Age: 67
End: 2022-02-14

## 2022-02-14 VITALS
OXYGEN SATURATION: 97 % | WEIGHT: 204 LBS | BODY MASS INDEX: 34.83 KG/M2 | HEART RATE: 92 BPM | SYSTOLIC BLOOD PRESSURE: 110 MMHG | HEIGHT: 64 IN | DIASTOLIC BLOOD PRESSURE: 70 MMHG

## 2022-02-14 PROCEDURE — 99214 OFFICE O/P EST MOD 30 MIN: CPT | Mod: 25

## 2022-02-14 PROCEDURE — ZZZZZ: CPT

## 2022-02-14 PROCEDURE — 93000 ELECTROCARDIOGRAM COMPLETE: CPT

## 2022-02-14 NOTE — HISTORY OF PRESENT ILLNESS
[FreeTextEntry1] : 66 year old female presents for follow up. \par Using Estrogen daily, urinating every 2-3 hours day and night, still has off and on hematuria. \par Taking Myrbetriq 50 mg. \par \par Catheterized specimen(2/4/22): No growth. \par Catheterized specimen(1/18/22): ESBL E coli. Resistant to Nitrofurantoin. Treated with Bactrim. \par \par In the past with Myrbetriq 50 mg less frequency, every 3-4 hours and less urinary incontinence. \par \par Had seen Gynecology and has been ruled out for Gynecology issues. \par \par Status post Cystoscopy and bladder biopsy with fulguration done on 4/12/21 at [facility].\par Pathology: patchy inflammation.\par \par Recently seen for gross hematuria. \par Had been having off and on gross hematuria for last 1 year, was being treated with Antibiotics. \par Last urine negative for infection but still had gross hematuria. \par Complained of off and on suprapubic pain and dysuria.  Has chronic back pain. \par Urinates every 3  hours or so during the day and night every 1-2 hours or so. \par Denied nausea, vomiting, fever, chills or rigors. \par Past smoker: < 1PPD for 20 years, quit 40 years ago.\par \par Initially seen for Ureteral stone.  \par Passed ureteral stone in 2018, did not follow up.\par Went to [facility] with severe Lt Flank pain, some nausea and vomiting. \par Had CT scan was told has kidney stone and sent home on Flomax, Pain meds. \par Did not see the stone pass. Pain has decreased but still needed pain meds off and on and was taking Flomax BID. Had chronic back pain, few weeks before the ER visit had been having back pain and passed 2 stones. No prior history of Kidney stone. \par Since ER visit had been urinating every hour or so with urgency. Saw some blood 2 days ago. \par \par

## 2022-02-14 NOTE — HISTORY OF PRESENT ILLNESS
[FreeTextEntry1] : Pt is a 67 y/o F PMH DM, hypothyroidism, asthma.  Today she tells me that she wakes up with heart pounding, occurs once a week, lasts 10-15 min.  She denies CP, SOB, diaphoresis, dizziness, syncope, LE edema, PND, orthopnea. \par COVID+ 01/2021 hospitalized for 1 mnth ay [facility]\par COVID vaccine 06/2021, booster \par \par TTE 04/2018 normal LV function, mild DD, mild PI\par TTE 04/2021 normal LV function without significant valvular pathology\par Nuclear stress test 09/2020 breast and diaphragm attenuation.  Small, mild defect in anterior wall that is reversible suggestive of mild ischemia, EF 66%\par \par PMH: DM, hypothyroidism, asthma, nephrolithiasis, gastric bypass\par Smoking status: quit 16 yrs ago\par Current exercise: none\par Daily water intake: 32 oz\par Daily caffeine intake: 1 cup coffee\par OTC medications: see list\par Family hx: mother and father stroke, mother PPM, father ?MI at age 70\par Previous cardiac testing: stress test many yrs ago "normal"\par Previous hospitalizations: knee and back surgery\par

## 2022-02-14 NOTE — DISCUSSION/SUMMARY
[FreeTextEntry1] : Pt is a 65 y/o F PMH DM, hypothyroidism, asthma.  Today she is c/o palpitations\par \par TTE 04/2018 normal LV function, mild DD, mild PI\par TTE 04/2021 normal LV function without significant valvular pathology\par Nuclear stress test 09/2020 breast and diaphragm attenuation.  Small, mild defect in anterior wall that is reversible suggestive of mild ischemia, EF 66%\par Medical management\par \par palps:\par Given pt's symptoms will check jarrett monitor to assess for ectopy.  Advised adequate hydration.  Drug use, OTC medication use, caffeine consumption reviewed \par \par DM:\par follows with PCP\par c/w current meds\par goal A1c <7\par Advised lifestyle modifications \par VSS\par check lipid profile\par \par Pt will return in 6 mnths or sooner as needed but I encouraged communication via phone or portal if necessary. \par The described plan was discussed with the pt.  All questions and concerns were addressed to the best of my knowledge.\par

## 2022-02-14 NOTE — ASSESSMENT
[FreeTextEntry1] : Recurrent urinary tract infections:\par Atrophic vaginitis:\par Discussed catheterized specimen. \par Continue Estrogen every other day.\par Will start Bactrim daily for prophylaxis.\par \par Overactive Bladder:\par Continue Myrbetriq 50 mg. \par \par Return to office in 2 months or sooner if any issues.

## 2022-02-14 NOTE — PHYSICAL EXAM
[Normal Appearance] : normal appearance [General Appearance - In No Acute Distress] : no acute distress [Skin Color & Pigmentation] : normal skin color and pigmentation [] : no respiratory distress [Oriented To Time, Place, And Person] : oriented to person, place, and time [FreeTextEntry1] : normal peripheral circulation

## 2022-02-16 ENCOUNTER — APPOINTMENT (OUTPATIENT)
Dept: NEUROLOGY | Facility: CLINIC | Age: 67
End: 2022-02-16
Payer: MEDICARE

## 2022-02-16 VITALS
BODY MASS INDEX: 35 KG/M2 | HEART RATE: 96 BPM | HEIGHT: 64 IN | WEIGHT: 205 LBS | SYSTOLIC BLOOD PRESSURE: 119 MMHG | DIASTOLIC BLOOD PRESSURE: 74 MMHG

## 2022-02-16 PROCEDURE — 95910 NRV CNDJ TEST 7-8 STUDIES: CPT

## 2022-02-16 PROCEDURE — 95885 MUSC TST DONE W/NERV TST LIM: CPT

## 2022-02-20 LAB
ALBUMIN SERPL ELPH-MCNC: 4.1 G/DL
ALP BLD-CCNC: 94 U/L
ALT SERPL-CCNC: 18 U/L
ANION GAP SERPL CALC-SCNC: 15 MMOL/L
AST SERPL-CCNC: 15 U/L
BILIRUB SERPL-MCNC: 0.2 MG/DL
BUN SERPL-MCNC: 13 MG/DL
CALCIUM SERPL-MCNC: 9.3 MG/DL
CHLORIDE SERPL-SCNC: 104 MMOL/L
CHOLEST SERPL-MCNC: 186 MG/DL
CK SERPL-CCNC: 33 U/L
CO2 SERPL-SCNC: 22 MMOL/L
CREAT SERPL-MCNC: 0.46 MG/DL
GLUCOSE SERPL-MCNC: 318 MG/DL
HDLC SERPL-MCNC: 49 MG/DL
LDLC SERPL CALC-MCNC: 104 MG/DL
NONHDLC SERPL-MCNC: 137 MG/DL
POTASSIUM SERPL-SCNC: 4 MMOL/L
PROT SERPL-MCNC: 6.5 G/DL
SODIUM SERPL-SCNC: 141 MMOL/L
TRIGL SERPL-MCNC: 161 MG/DL

## 2022-02-21 NOTE — PLAN
Dear Karolyn Michelle    After reviewing your responses, I've been able to diagnose you with a urinary tract infection, which is a common infection of the bladder with bacteria.  This is not a sexually transmitted infection, though urinating immediately after intercourse can help prevent infections.  Drinking lots of fluids is also helpful to clear your current infection and prevent the next one.      I have sent a prescription for antibiotics to your pharmacy to treat this infection.    It is important that you take all of your prescribed medication even if your symptoms are improving after a few doses.  Taking all of your medicine helps prevent the symptoms from returning.     If your symptoms worsen, you develop pain in your back or stomach, develop fevers, or are not improving in 5 days, please contact your primary care provider for an appointment or visit any of our convenient Walk-in or Urgent Care Centers to be seen, which can be found on our website here.    Thanks again for choosing us as your health care partner,    Angeles Hair MD   [FreeTextEntry1] : .

## 2022-02-24 ENCOUNTER — APPOINTMENT (OUTPATIENT)
Dept: DERMATOLOGY | Facility: CLINIC | Age: 67
End: 2022-02-24
Payer: MEDICARE

## 2022-02-24 DIAGNOSIS — L30.9 DERMATITIS, UNSPECIFIED: ICD-10-CM

## 2022-02-24 PROCEDURE — 99213 OFFICE O/P EST LOW 20 MIN: CPT

## 2022-02-24 RX ORDER — PEN NEEDLE, DIABETIC 29 G X1/2"
31G X 5 MM NEEDLE, DISPOSABLE MISCELLANEOUS
Qty: 1 | Refills: 2 | Status: ACTIVE | COMMUNITY
Start: 2022-02-24

## 2022-02-24 NOTE — PHYSICAL EXAM
[Alert] : alert [Oriented x 3] : ~L oriented x 3 [Well Nourished] : well nourished [FreeTextEntry3] : Scalp: No obvious thinning seen\par 2 - 3 hairs removed per gentle tug diffusely\par \par Hands::Ill-defined erythema and scaling present on the fingertips, especially around the nails

## 2022-02-24 NOTE — HISTORY OF PRESENT ILLNESS
[FreeTextEntry1] : Hair loss [de-identified] : Followup visit for 66-year-old white female last seen by me in November 8, 2021, with an almost one year history of marked hair loss and dystrophy of the nails.\par Previously diagnosed as a telogen effluvium and Beau's lines, most probably secondary to prior\par COVID-19 infection and pneumonia incurred in January and February of 2021.\par At the last visit, the nails had improved with hair loss persisted.\par The patient was started on minoxidil 1.25 mg p.o. at night.  Patient has been off this for one week.\par \par Patient states hair loss persists but "not as much"\par \par Note: Patient has been getting frequent urinary tract infections. On antibiotics for this.\par  Diabetes has also flared.\par \par Also complains of dryness of the fingertips.

## 2022-03-01 ENCOUNTER — APPOINTMENT (OUTPATIENT)
Dept: CT IMAGING | Facility: CLINIC | Age: 67
End: 2022-03-01
Payer: MEDICARE

## 2022-03-01 ENCOUNTER — APPOINTMENT (OUTPATIENT)
Dept: RADIOLOGY | Facility: CLINIC | Age: 67
End: 2022-03-01
Payer: MEDICARE

## 2022-03-01 ENCOUNTER — OUTPATIENT (OUTPATIENT)
Dept: OUTPATIENT SERVICES | Facility: HOSPITAL | Age: 67
LOS: 1 days | End: 2022-03-01
Payer: MEDICARE

## 2022-03-01 DIAGNOSIS — Z98.89 OTHER SPECIFIED POSTPROCEDURAL STATES: Chronic | ICD-10-CM

## 2022-03-01 DIAGNOSIS — Z90.49 ACQUIRED ABSENCE OF OTHER SPECIFIED PARTS OF DIGESTIVE TRACT: Chronic | ICD-10-CM

## 2022-03-01 DIAGNOSIS — Z98.84 BARIATRIC SURGERY STATUS: Chronic | ICD-10-CM

## 2022-03-01 DIAGNOSIS — K46.9 UNSPECIFIED ABDOMINAL HERNIA WITHOUT OBSTRUCTION OR GANGRENE: Chronic | ICD-10-CM

## 2022-03-01 DIAGNOSIS — M75.120 COMPLETE ROTATOR CUFF TEAR OR RUPTURE OF UNSPECIFIED SHOULDER, NOT SPECIFIED AS TRAUMATIC: Chronic | ICD-10-CM

## 2022-03-01 DIAGNOSIS — Z96.653 PRESENCE OF ARTIFICIAL KNEE JOINT, BILATERAL: Chronic | ICD-10-CM

## 2022-03-01 DIAGNOSIS — L02.92 FURUNCLE, UNSPECIFIED: Chronic | ICD-10-CM

## 2022-03-01 DIAGNOSIS — Z41.1 ENCOUNTER FOR COSMETIC SURGERY: Chronic | ICD-10-CM

## 2022-03-01 DIAGNOSIS — Z00.8 ENCOUNTER FOR OTHER GENERAL EXAMINATION: ICD-10-CM

## 2022-03-01 PROCEDURE — 72100 X-RAY EXAM L-S SPINE 2/3 VWS: CPT | Mod: 26

## 2022-03-01 PROCEDURE — 72131 CT LUMBAR SPINE W/O DYE: CPT | Mod: 26,MH

## 2022-03-01 PROCEDURE — 72100 X-RAY EXAM L-S SPINE 2/3 VWS: CPT

## 2022-03-01 PROCEDURE — 72131 CT LUMBAR SPINE W/O DYE: CPT | Mod: MH

## 2022-03-08 ENCOUNTER — APPOINTMENT (OUTPATIENT)
Dept: FAMILY MEDICINE | Facility: CLINIC | Age: 67
End: 2022-03-08
Payer: MEDICARE

## 2022-03-08 VITALS
HEIGHT: 64 IN | HEART RATE: 98 BPM | RESPIRATION RATE: 16 BRPM | SYSTOLIC BLOOD PRESSURE: 110 MMHG | BODY MASS INDEX: 35 KG/M2 | OXYGEN SATURATION: 98 % | TEMPERATURE: 97.9 F | DIASTOLIC BLOOD PRESSURE: 86 MMHG | WEIGHT: 205 LBS

## 2022-03-08 PROCEDURE — 96372 THER/PROPH/DIAG INJ SC/IM: CPT

## 2022-03-08 RX ORDER — DENOSUMAB 60 MG/ML
60 INJECTION SUBCUTANEOUS
Qty: 1 | Refills: 0 | Status: COMPLETED | OUTPATIENT
Start: 2022-03-08

## 2022-03-08 RX ADMIN — DENOSUMAB 1 MG/ML: 60 INJECTION SUBCUTANEOUS at 00:00

## 2022-03-08 NOTE — ASSESSMENT
[FreeTextEntry1] : osteoporosis - prolia administered, pt consent given before administration and after discussion of risks/benefits, pt tolerated well. next injection after 9/8/22

## 2022-03-17 ENCOUNTER — APPOINTMENT (OUTPATIENT)
Dept: FAMILY MEDICINE | Facility: CLINIC | Age: 67
End: 2022-03-17

## 2022-03-22 ENCOUNTER — APPOINTMENT (OUTPATIENT)
Dept: ORTHOPEDIC SURGERY | Facility: CLINIC | Age: 67
End: 2022-03-22
Payer: MEDICARE

## 2022-03-22 ENCOUNTER — NON-APPOINTMENT (OUTPATIENT)
Age: 67
End: 2022-03-22

## 2022-03-22 DIAGNOSIS — M12.812 OTHER SPECIFIC ARTHROPATHIES, NOT ELSEWHERE CLASSIFIED, LEFT SHOULDER: ICD-10-CM

## 2022-03-22 PROCEDURE — 99213 OFFICE O/P EST LOW 20 MIN: CPT

## 2022-03-22 PROCEDURE — 73030 X-RAY EXAM OF SHOULDER: CPT | Mod: LT

## 2022-03-22 NOTE — PHYSICAL EXAM
[Normal] : Oriented to person, place, and time, insight and judgement were intact and the affect was normal [de-identified] : left Shoulder Exam\par \par Inspection: No malalignment, No defects\par Skin: No masses, No lesions\par Neck: Negative Spurlings, full ROM without pain\par ROM:range of motion of the shoulders intact bilaterally\par Painful arc ROM: Abduction\par Tenderness: no bicipital tenderness, no tenderness to the greater tuberosity/RTC insertion, no anterior shoulder/lesser tuberosity tenderness\par Strength: 5/5 ER, 5/5 IR in adduction, 4/5 supraspinatus testing\par AC Joint: No ttp, no pain with cross arm testing\par Biceps: Speed negative\par Impingement test: negative Vasquez\par Stability: Negative apprehension, negative anterior/posterior load and shift\par Vasc: 2+ radial pulse\par Neuro: AIN, PIN, Ulnar nerve in tact to motor\par Sensation: In tact to light touch throughout\par \par \par  [de-identified] : 3 x-ray views of the left shoulder are reviewed,there is evidence of a healed proximal humerus fracture as well as a metallic anchor consistent with previous rotator cuff repair in the greater tuberosity. The humerus is slightly high riding consistent with rotator cuff arthropathy

## 2022-03-22 NOTE — HISTORY OF PRESENT ILLNESS
[FreeTextEntry1] : 66 year female presents for evaluation of left shoulder weakness.  patient reports a history of left rotator cuff repair surgery.  She subsequently sustained a mechanical fall and suffered a displaced left proximal humerus fracture. This was treated nonoperatively, since that time she has had weakness in forward flexion and abduction of the left shoulder. She does not complain of pain at this time.

## 2022-03-22 NOTE — ASSESSMENT
[FreeTextEntry1] : the patient is a 66-year-old female with history of rotator cuff repair as well as a proximal humerus fracture of the left shoulder. The patient currently has excellent range of motion and no significant pain her main complaint is weakness in forward flexion and abduction. Risks and benefits of continued conservative treatment and strengthening versus surgical intervention for reverse total shoulder arthroplasty were discussed at length the patient at this time given her lack of pain I recommend continued conservative treatment. The patient is in agreement with this plan and will followup on an as-needed basis.

## 2022-03-27 ENCOUNTER — TRANSCRIPTION ENCOUNTER (OUTPATIENT)
Age: 67
End: 2022-03-27

## 2022-03-29 ENCOUNTER — APPOINTMENT (OUTPATIENT)
Dept: FAMILY MEDICINE | Facility: CLINIC | Age: 67
End: 2022-03-29
Payer: MEDICARE

## 2022-03-29 VITALS
OXYGEN SATURATION: 98 % | HEIGHT: 64 IN | TEMPERATURE: 98.1 F | DIASTOLIC BLOOD PRESSURE: 82 MMHG | SYSTOLIC BLOOD PRESSURE: 110 MMHG | WEIGHT: 202 LBS | BODY MASS INDEX: 34.49 KG/M2 | HEART RATE: 71 BPM

## 2022-03-29 DIAGNOSIS — R07.89 OTHER CHEST PAIN: ICD-10-CM

## 2022-03-29 PROCEDURE — 99214 OFFICE O/P EST MOD 30 MIN: CPT

## 2022-03-29 NOTE — HISTORY OF PRESENT ILLNESS
[FreeTextEntry8] : Pt c/o elevated glucose s/p fall onto her chest 3/26/22. Pt went to UC on 3/27/22 CXR neg for pneumothorax or fx. Pt taking flexeril helps minimally. Pt has hx Crohn's, pt reports she has restarted having bloody stools in past 2 days. \par Glucose has been very elevated, usually averaging 300-350+ since the fall. Pt denies change in diet and states she had been 170s-190s prior.

## 2022-03-29 NOTE — PHYSICAL EXAM
[Normal] : affect was normal and insight and judgment were intact [de-identified] : anterior chest wall tenderness, ecchymosis

## 2022-03-29 NOTE — ASSESSMENT
[FreeTextEntry1] : DM, uncontrolled - start lantus 15 units qhs\par muscular chest pain s/p fall - ice area\par Crohn's exacerbation - start trial of mesalamine. will hold off on steroids due to uncontrolled DM. f/u with GI

## 2022-04-01 ENCOUNTER — APPOINTMENT (OUTPATIENT)
Dept: NEUROLOGY | Facility: CLINIC | Age: 67
End: 2022-04-01
Payer: MEDICARE

## 2022-04-01 ENCOUNTER — APPOINTMENT (OUTPATIENT)
Dept: FAMILY MEDICINE | Facility: CLINIC | Age: 67
End: 2022-04-01
Payer: MEDICARE

## 2022-04-01 VITALS
OXYGEN SATURATION: 97 % | HEIGHT: 64 IN | TEMPERATURE: 97.8 F | BODY MASS INDEX: 34.06 KG/M2 | SYSTOLIC BLOOD PRESSURE: 116 MMHG | DIASTOLIC BLOOD PRESSURE: 76 MMHG | WEIGHT: 199.5 LBS | HEART RATE: 74 BPM

## 2022-04-01 VITALS
SYSTOLIC BLOOD PRESSURE: 128 MMHG | WEIGHT: 199 LBS | DIASTOLIC BLOOD PRESSURE: 82 MMHG | BODY MASS INDEX: 33.97 KG/M2 | TEMPERATURE: 97.8 F | HEIGHT: 64 IN | HEART RATE: 73 BPM

## 2022-04-01 DIAGNOSIS — M54.9 DORSALGIA, UNSPECIFIED: ICD-10-CM

## 2022-04-01 DIAGNOSIS — R29.6 REPEATED FALLS: ICD-10-CM

## 2022-04-01 PROCEDURE — 99214 OFFICE O/P EST MOD 30 MIN: CPT

## 2022-04-01 PROCEDURE — 99213 OFFICE O/P EST LOW 20 MIN: CPT

## 2022-04-01 NOTE — HISTORY OF PRESENT ILLNESS
[FreeTextEntry1] : Last visit 12/10/21.\par She fell on 3/26. She states she was going up a step, lost balance and fell on her chest.\par She had two other falls since the last visit.\par Sometimes she has dizziness.\par She does not believe there is any LOC.\par Legs feel weak. \par Back pain persists. \par She takes cyclobenzaprine at bedtime. \par \par DEXA scan showed osteoporosis. She was started on Prolia.\par \par She continues to report memory loss. \par She does lose time. \par \par She is having difficulty with blood sugar control. \par \par \par

## 2022-04-01 NOTE — PHYSICAL EXAM
[FreeTextEntry1] : Examination:\par Constitutional: normal, no apparent distress\par Eyes: normal conjunctiva b/l, no ptosis, visual fields full\par Respiratory: no respiratory distress, normal effort, normal auscultation\par Cardiovascular: normal rate, rhythm, no murmurs\par Neck: supple, no masses\par Vascular: carotids normal\par Skin: normal color, no rashes\par Psych: normal mood, affect\par \par Neurological:\par Memory: normal memory, oriented to person, place, time\par Language intact/no aphasia\par Cranial Nerves: II-XII intact, Pupils equally round and reactive to light, ocular muscles/movements intact, no ptosis, no facial weakness, tongue protrudes normally in the midline, \par Motor: normal tone, no pronator drift, full strength in all four extremities in the proximal and distal muscle groups\par Coordination: Fine motor movements intact, rapid alternating movements intact, finger to nose intact bilaterally\par Sensory: intact to light touch\par DTRs: hypoactive\par Gait: narrow based, steady\par

## 2022-04-01 NOTE — DISCUSSION/SUMMARY
[FreeTextEntry1] : Ms. García is a 66 year old woman who initially presented for evaluation of frequent falls.\par She also reports having concerns about her memory.\par \par She has had exacerbation of symptoms since infection with covid-19.\par \par Frequent Falls\par -Falls had improved and now have recurred. Three falls since last visit. 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 in past. Repeat 24 hour EEG is unremarkable\par -It has not been clear that she has had definite seizures.\par -Will reduce oxcarbazepine to 300 mg BID.\par -Last Na level was 141\par \par \par \par \par Memory Impairment\par -She scored 21/30 on the [place] Cognitive Assessment initially with delayed recall being the area of worst performance.\par -Vitamin B12 and TSH levels normal.\par -Now with worsening of memory since covid, possibly anxiety component and brain fog\par -Participate in mentally stimulating activities. Encouraged reading, puzzles, etc.\par \par f/u 2-3 months, sooner if needed\par \par \par  \par

## 2022-04-01 NOTE — DATA REVIEWED
[de-identified] : MRI brain without contrast 1/8/20: \par No evidence of acute infarction, hemorrhage or hydrocephalus.\par Minimal non-acute small vessel ischemic change.\par \par MRI brain with and without contrast and seizure protocol 9/14/20:\par \par No hydrocephalus, mass effect, acute intracranial hemorrhage, vasogenic edema, or acute territorial infarct.\par \par Tiny nonspecific focus of increased T2 and FLAIR hyperintense signal involving the superior right insular cortex (6-20). This could represent a small chronic infarct. Cortical dysplasia is considered much less likely.\par \par No abnormal parenchymal or leptomeningeal enhancement.\par \par No evidence for mesial temporal sclerosis or gray matter heterotopia.\par \par MRI brain and cervical spine 8/11/21:\par \par BRAIN:\par No acute intracranial findings.\par \par CERVICAL SPINE:\par No acute cord abnormalities.\par Multilevel spondylosis, not significantly changed from 6/13/2019.\par  [de-identified] : Routine EEG 8/19/20: \par This was an abnormal EEG study in the awake and drowsy states due to the presence of:\par -Bilateral independent temporal sharp waves, left more frequent than right, with phase reversal at F7 and F8.\par \par EEG Impression/Clinical Correlate:\par The findings are consistent with a risk for focal onset seizures.\par  Consider a repeat study if clinically indicated. \par \par 24 hour EEG 8/19/20-8/20/20:\par \par EEG Summary/Classification:\par This was an abnormal EEG study in the awake, drowsy, and asleep states due to the presence of bilateral independent temporal sharp waves with phase reversal at F7 and F8, seen more frequently on the left.\par \par EEG Impression/Clinical Correlate:\par The findings are suggestive of an risk for focal onset seizures. \par \par EEG 3/10/21:\par B/L Independent temporal sharp waves [de-identified] : Blood tests 6/22/20:\par TSH 3.1, vitamin D 27.4\par Blood tests 1/8/20: B12 667\par HbA1C 7.6\par \par MRI lumbar spine 1/12/2022:\par \par Status post posterior instrumented fusion at L3-L4 with discectomy and interbody fusion. Posterior decompression at L3-L4, L4-L5, and L5-S1.\par \par Grade 2 anterolisthesis of L5 on S1.\par \par Multilevel degenerative changes throughout the lumbar spine, as detailed above, worst at L2-L3, L4-L5, and L5-S1.\par \par DEXA scan 2/22/22: Osteopporosis.

## 2022-04-03 RX ORDER — PREDNISOLONE ACETATE 10 MG/ML
1 SUSPENSION/ DROPS OPHTHALMIC
Qty: 5 | Refills: 0 | Status: COMPLETED | COMMUNITY
Start: 2022-02-21

## 2022-04-03 RX ORDER — AMOXICILLIN 875 MG/1
875 TABLET, FILM COATED ORAL
Qty: 20 | Refills: 0 | Status: COMPLETED | COMMUNITY
Start: 2022-01-04

## 2022-04-03 RX ORDER — KETOROLAC TROMETHAMINE 5 MG/ML
0.5 SOLUTION OPHTHALMIC
Qty: 5 | Refills: 0 | Status: COMPLETED | COMMUNITY
Start: 2022-02-21

## 2022-04-03 RX ORDER — MOXIFLOXACIN OPHTHALMIC 5 MG/ML
0.5 SOLUTION/ DROPS OPHTHALMIC
Qty: 3 | Refills: 0 | Status: COMPLETED | COMMUNITY
Start: 2022-02-21

## 2022-04-03 RX ORDER — CLOTRIMAZOLE 10 MG/G
1 CREAM TOPICAL
Qty: 75 | Refills: 0 | Status: COMPLETED | COMMUNITY
Start: 2022-03-10

## 2022-04-03 NOTE — ASSESSMENT
[FreeTextEntry1] : DM - Engaged in discussion on nutrition education / counseling: The patient was educated on carb controlled diet, portion control, high fiber, meal planning and basic healthy eating. Patient to maintain adequate protein at each meal, maintain adequate hydration 48-64oz/day of water, and have carb controlled portions with each meal (approximately 45-60g/meal for women, 60g-75g/meal for men). Discussed healthy meal ideas and encouraged plate balance.\par Education Materials Given: Plate balance, meal planner, healthy snacks.\par All questions answered/contact office if questions are to arise at home. \par Comprehension: patient asked/verbalized appropriate questions and concerns, patient has good understanding\par \par Increase lantus to 20 units qhs. pt to call office in 1-2 weeks to assess glucose trends

## 2022-04-03 NOTE — HISTORY OF PRESENT ILLNESS
[de-identified] : Pt for f/u DM. Last a1c 7.0 but recently numbers had been spiking to 350 since she sustained a fall last week. Pt on metformin 1000mg qhs, januvia, ozempic 0.5mg, glimepiride. Started lantus 15 units qhs 3 days ago. Pt compliant w/ meds and tolerating them well. since adding lantus glucose fasting was 260s, 169 today. During day 200-289 postprandial\par Usual meals look like:\par Breakfast cup of coffee, no meal\par Lunch 3PM salad w/ shrimp or chicken w/ dressing apple cider vinegar w/ olive oil\par Dinner Rice-a-lore w/ pork or chicken, onions, mushroom and salad, sometimes steak\par Snacks Rice puff sticks, sometimes chocolate, sweet potato chips. Sometimes oranges or tangerines\par water/day 32 oz/day. No sodas\par Exercise minimal due to problems w/ balance

## 2022-04-04 ENCOUNTER — APPOINTMENT (OUTPATIENT)
Dept: SURGERY | Facility: CLINIC | Age: 67
End: 2022-04-04
Payer: MEDICARE

## 2022-04-04 VITALS
SYSTOLIC BLOOD PRESSURE: 121 MMHG | OXYGEN SATURATION: 97 % | BODY MASS INDEX: 33.97 KG/M2 | TEMPERATURE: 97.5 F | HEART RATE: 69 BPM | WEIGHT: 199 LBS | DIASTOLIC BLOOD PRESSURE: 77 MMHG | HEIGHT: 64 IN

## 2022-04-04 DIAGNOSIS — Z98.890 OTHER SPECIFIED POSTPROCEDURAL STATES: ICD-10-CM

## 2022-04-04 DIAGNOSIS — E66.9 OBESITY, UNSPECIFIED: ICD-10-CM

## 2022-04-04 DIAGNOSIS — Z87.19 OTHER SPECIFIED POSTPROCEDURAL STATES: ICD-10-CM

## 2022-04-04 PROCEDURE — 99214 OFFICE O/P EST MOD 30 MIN: CPT

## 2022-04-04 NOTE — HISTORY OF PRESENT ILLNESS
[de-identified] : The patient returns to the office with complaints of abdominal wall pain in the upper midline in the area of her prior hernia repair.  The patient reports that she feels as though there is more bulging than before.  She has no nausea, no vomit, and no changes in her bowel function.  She has been able to lose about 20 pounds since last seen in the office.

## 2022-04-04 NOTE — ASSESSMENT
[FreeTextEntry1] : The patient is an obese 66 year old female with abdominal wall pain and bulging in the area of her prior hernia repair.  She has no evidence on CT scan imaging or clinical exam for recurrence of the hernia.  There is bulging of the upper abdominal wall related to diastasis.  I have suggested that she continue efforts at weight loss to offload tension along the abdominal wall and to utilize a binder to help provide support to the abdominal wall in an effort to relieve some discomfort.   A total of 30 minutes was spent coordinating the patient's care.

## 2022-04-04 NOTE — PHYSICAL EXAM
[JVD] : no jugular venous distention  [No Rash or Lesion] : No rash or lesion [Purpura] : no purpura  [Petechiae] : no petechiae [Skin Ulcer] : no ulcer [Skin Induration] : no induration [Alert] : alert [Oriented to Person] : oriented to person [Oriented to Place] : oriented to place [Oriented to Time] : oriented to time [Calm] : calm [de-identified] : non toxic, in no acute distress  [de-identified] : NC/AT PERRL EOMI no scleral icterus  [de-identified] : trachea midline, no gross mass [de-identified] : no audible wheezing or stridor  [de-identified] : remains markedly obese and protuberant with no localizing tenderness, no guarding, no rebound, no masses  [de-identified] : FROM of all extremities with no gross deformity or angulation, there remains a diastasis rectus of the upper midline in the area of the prior incision site with no evidence of recurrent herniation, there are no inguinal hernias  [de-identified] : surgical scars consistent with prior surgical history  [de-identified] : mood is calm

## 2022-04-12 ENCOUNTER — APPOINTMENT (OUTPATIENT)
Dept: UROLOGY | Facility: CLINIC | Age: 67
End: 2022-04-12
Payer: MEDICARE

## 2022-04-12 VITALS — TEMPERATURE: 97.6 F

## 2022-04-12 DIAGNOSIS — N95.2 POSTMENOPAUSAL ATROPHIC VAGINITIS: ICD-10-CM

## 2022-04-12 DIAGNOSIS — N32.81 OVERACTIVE BLADDER: ICD-10-CM

## 2022-04-12 PROCEDURE — 99214 OFFICE O/P EST MOD 30 MIN: CPT

## 2022-04-18 PROBLEM — N95.2 ATROPHIC VAGINITIS: Status: ACTIVE | Noted: 2021-09-28

## 2022-04-18 PROBLEM — N32.81 OVERACTIVE BLADDER: Status: ACTIVE | Noted: 2021-07-27

## 2022-04-18 NOTE — ASSESSMENT
[FreeTextEntry1] : Recurrent urinary tract infections:\par Atrophic vaginitis:\par Overactive Bladder:\par Continue Estrogen cream.\par Continue Myrbetriq.\par Continue Bactrim prophylaxis. \par \par Return to office in 3 months or sooner if any issues.\par Will do catheterized specimen, if negative for infection will consider stopping Antibiotics prophylaxis.

## 2022-04-18 NOTE — PHYSICAL EXAM
[Normal Appearance] : normal appearance [General Appearance - In No Acute Distress] : no acute distress [] : no respiratory distress [Oriented To Time, Place, And Person] : oriented to person, place, and time [Normal Station and Gait] : the gait and station were normal for the patient's age

## 2022-04-18 NOTE — HISTORY OF PRESENT ILLNESS
[FreeTextEntry1] : 66 year old female presents for follow up. \par Has had intentional 50 lbs weight loss over 6 months. \par Taking Myrbetriq 50 mg. Ran out of Antibiotics prophylaxis. Using Estrogen every other day.\par Few weeks ago had gross hematuria, has off and on dysuria.\par Urinating every 2-3 hours day and night. \par Has occasional urinary incontinence. \par \par Catheterized specimen(2/4/22): No growth. \par Catheterized specimen(1/18/22): ESBL E coli. Resistant to Nitrofurantoin. Treated with Bactrim. \par \par In the past with Myrbetriq 50 mg less frequency, every 3-4 hours and less urinary incontinence. \par \par Had seen Gynecology and has been ruled out for Gynecology issues. \par \par Status post Cystoscopy and bladder biopsy with fulguration done on 4/12/21 at [facility].\par Pathology: patchy inflammation.\par \par Recently seen for gross hematuria. \par Had been having off and on gross hematuria for last 1 year, was being treated with Antibiotics. \par Last urine negative for infection but still had gross hematuria. \par Complained of off and on suprapubic pain and dysuria.  Has chronic back pain. \par Urinates every 3  hours or so during the day and night every 1-2 hours or so. \par Denied nausea, vomiting, fever, chills or rigors. \par Past smoker: < 1PPD for 20 years, quit 40 years ago.\par \par Initially seen for Ureteral stone.  \par Passed ureteral stone in 2018, did not follow up.\par Went to [facility] with severe Lt Flank pain, some nausea and vomiting. \par Had CT scan was told has kidney stone and sent home on Flomax, Pain meds. \par Did not see the stone pass. Pain has decreased but still needed pain meds off and on and was taking Flomax BID. Had chronic back pain, few weeks before the ER visit had been having back pain and passed 2 stones. No prior history of Kidney stone. \par Since ER visit had been urinating every hour or so with urgency. Saw some blood 2 days ago. \par \par

## 2022-05-02 ENCOUNTER — RX RENEWAL (OUTPATIENT)
Age: 67
End: 2022-05-02

## 2022-05-06 ENCOUNTER — RX RENEWAL (OUTPATIENT)
Age: 67
End: 2022-05-06

## 2022-05-10 ENCOUNTER — APPOINTMENT (OUTPATIENT)
Dept: FAMILY MEDICINE | Facility: CLINIC | Age: 67
End: 2022-05-10
Payer: MEDICARE

## 2022-05-10 VITALS
HEIGHT: 64 IN | TEMPERATURE: 97.4 F | DIASTOLIC BLOOD PRESSURE: 68 MMHG | OXYGEN SATURATION: 97 % | SYSTOLIC BLOOD PRESSURE: 112 MMHG | WEIGHT: 206 LBS | BODY MASS INDEX: 35.17 KG/M2 | HEART RATE: 76 BPM

## 2022-05-10 DIAGNOSIS — R10.9 UNSPECIFIED ABDOMINAL PAIN: ICD-10-CM

## 2022-05-10 PROCEDURE — 99214 OFFICE O/P EST MOD 30 MIN: CPT

## 2022-05-12 ENCOUNTER — NON-APPOINTMENT (OUTPATIENT)
Age: 67
End: 2022-05-12

## 2022-05-12 ENCOUNTER — APPOINTMENT (OUTPATIENT)
Dept: CARDIOLOGY | Facility: CLINIC | Age: 67
End: 2022-05-12
Payer: MEDICARE

## 2022-05-12 VITALS
SYSTOLIC BLOOD PRESSURE: 130 MMHG | WEIGHT: 206 LBS | DIASTOLIC BLOOD PRESSURE: 90 MMHG | HEART RATE: 76 BPM | BODY MASS INDEX: 35.17 KG/M2 | HEIGHT: 64 IN | OXYGEN SATURATION: 97 %

## 2022-05-12 DIAGNOSIS — R07.9 CHEST PAIN, UNSPECIFIED: ICD-10-CM

## 2022-05-12 PROCEDURE — 93000 ELECTROCARDIOGRAM COMPLETE: CPT

## 2022-05-12 PROCEDURE — 99214 OFFICE O/P EST MOD 30 MIN: CPT | Mod: 25

## 2022-05-12 NOTE — ASSESSMENT
[FreeTextEntry1] : DM- cont meds, inc lantus to 20 units qhs. check labs. refer to endo as pt's glucose has been labile\par GERD- advised dietary modifications to decrease caffeine intake, chocolate, spicy or acidic foods/drinks. refill dexilant

## 2022-05-12 NOTE — HISTORY OF PRESENT ILLNESS
[de-identified] : Pt for f/u DM. Last a1c 7.0 but then numbers had been spiking to 350 since she sustained a fall. Pt now on metformin 1000mg qhs, januvia, ozempic 0.25mg q week, glimepiride. Pt on lantus 15 units qhs. Pt compliant w/ meds and tolerating them well. since adding lantus glucose fasting was 200-210. During day 280s postprandial\par Pt has chronic gerd, helped w/ dexilant due to reifll

## 2022-05-12 NOTE — HISTORY OF PRESENT ILLNESS
[FreeTextEntry1] : Pt is a 67 y/o F PMH DM, hypothyroidism, asthma.  She tells me that last night she had sharp shooting pain in her central chest which lasted about 10min.  She mentiosn that she has had a few episodes of L arm pain radiating to chest \par COVID+ 01/2021 hospitalized for 1 mnth at [facility]\par COVID vaccine 06/2021, booster \par \par TTE 04/2018 normal LV function, mild DD, mild PI\par TTE 04/2021 normal LV function without significant valvular pathology\par Nuclear stress test 09/2020 breast and diaphragm attenuation.  Small, mild defect in anterior wall that is reversible suggestive of mild ischemia, EF 66%\par jarrett 05/2022 showed PVCs 4.6%\par \par PMH: DM, hypothyroidism, asthma, nephrolithiasis, gastric bypass\par Smoking status: quit 16 yrs ago\par Current exercise: none\par Daily water intake: 32 oz\par Daily caffeine intake: 1 cup coffee\par OTC medications: see list\par Family hx: mother and father stroke, mother PPM, father ?MI at age 70\par Previous hospitalizations: knee and back surgery\par

## 2022-05-12 NOTE — DISCUSSION/SUMMARY
[FreeTextEntry1] : Pt is a 67 y/o F PMH DM, hypothyroidism, asthma.  Today she is c/o chest pain\par check CCTA to eval for CAD\par Advised pt to go to the nearest ED if symptoms persist or worsen \par \par TTE 04/2018 normal LV function, mild DD, mild PI\par TTE 04/2021 normal LV function without significant valvular pathology\par Nuclear stress test 09/2020 breast and diaphragm attenuation.  Small, mild defect in anterior wall that is reversible suggestive of mild ischemia, EF 66%\par Medical management\par \par palps:\par jarrett showed PVCs 4.6%\par c/w metoprolol\par \par DM:\par follows with PCP\par c/w current meds\par goal A1c <7\par Advised lifestyle modifications \par VSS\par \par The described plan was discussed with the pt.  All questions and concerns were addressed to the best of my knowledge.\par

## 2022-05-16 LAB
ALBUMIN SERPL ELPH-MCNC: 4.2 G/DL
ALP BLD-CCNC: 75 U/L
ALT SERPL-CCNC: 20 U/L
ANION GAP SERPL CALC-SCNC: 13 MMOL/L
AST SERPL-CCNC: 16 U/L
BILIRUB SERPL-MCNC: 0.2 MG/DL
BUN SERPL-MCNC: 18 MG/DL
CALCIUM SERPL-MCNC: 8.8 MG/DL
CHLORIDE SERPL-SCNC: 103 MMOL/L
CHOLEST SERPL-MCNC: 149 MG/DL
CO2 SERPL-SCNC: 25 MMOL/L
CREAT SERPL-MCNC: 0.46 MG/DL
EGFR: 107 ML/MIN/1.73M2
ESTIMATED AVERAGE GLUCOSE: 146 MG/DL
GLUCOSE SERPL-MCNC: 229 MG/DL
HBA1C MFR BLD HPLC: 6.7 %
HDLC SERPL-MCNC: 57 MG/DL
LDLC SERPL CALC-MCNC: 66 MG/DL
LPL SERPL-CCNC: 24 U/L
NONHDLC SERPL-MCNC: 92 MG/DL
POTASSIUM SERPL-SCNC: 4.3 MMOL/L
PROT SERPL-MCNC: 6.4 G/DL
SODIUM SERPL-SCNC: 141 MMOL/L
TRIGL SERPL-MCNC: 132 MG/DL

## 2022-05-24 ENCOUNTER — APPOINTMENT (OUTPATIENT)
Dept: DERMATOLOGY | Facility: CLINIC | Age: 67
End: 2022-05-24

## 2022-06-02 ENCOUNTER — OUTPATIENT (OUTPATIENT)
Dept: OUTPATIENT SERVICES | Facility: HOSPITAL | Age: 67
LOS: 1 days | End: 2022-06-02
Payer: MEDICARE

## 2022-06-02 ENCOUNTER — APPOINTMENT (OUTPATIENT)
Dept: CT IMAGING | Facility: CLINIC | Age: 67
End: 2022-06-02
Payer: MEDICARE

## 2022-06-02 DIAGNOSIS — Z98.89 OTHER SPECIFIED POSTPROCEDURAL STATES: Chronic | ICD-10-CM

## 2022-06-02 DIAGNOSIS — Z90.49 ACQUIRED ABSENCE OF OTHER SPECIFIED PARTS OF DIGESTIVE TRACT: Chronic | ICD-10-CM

## 2022-06-02 DIAGNOSIS — L02.92 FURUNCLE, UNSPECIFIED: Chronic | ICD-10-CM

## 2022-06-02 DIAGNOSIS — R07.9 CHEST PAIN, UNSPECIFIED: ICD-10-CM

## 2022-06-02 DIAGNOSIS — K46.9 UNSPECIFIED ABDOMINAL HERNIA WITHOUT OBSTRUCTION OR GANGRENE: Chronic | ICD-10-CM

## 2022-06-02 DIAGNOSIS — M75.120 COMPLETE ROTATOR CUFF TEAR OR RUPTURE OF UNSPECIFIED SHOULDER, NOT SPECIFIED AS TRAUMATIC: Chronic | ICD-10-CM

## 2022-06-02 DIAGNOSIS — Z41.1 ENCOUNTER FOR COSMETIC SURGERY: Chronic | ICD-10-CM

## 2022-06-02 DIAGNOSIS — Z96.653 PRESENCE OF ARTIFICIAL KNEE JOINT, BILATERAL: Chronic | ICD-10-CM

## 2022-06-02 DIAGNOSIS — Z98.84 BARIATRIC SURGERY STATUS: Chronic | ICD-10-CM

## 2022-06-02 PROCEDURE — 75574 CT ANGIO HRT W/3D IMAGE: CPT | Mod: ME

## 2022-06-02 PROCEDURE — G1004: CPT

## 2022-06-02 PROCEDURE — 75574 CT ANGIO HRT W/3D IMAGE: CPT | Mod: 26,ME

## 2022-06-08 ENCOUNTER — RX RENEWAL (OUTPATIENT)
Age: 67
End: 2022-06-08

## 2022-06-08 DIAGNOSIS — R31.0 GROSS HEMATURIA: ICD-10-CM

## 2022-06-08 DIAGNOSIS — N39.0 URINARY TRACT INFECTION, SITE NOT SPECIFIED: ICD-10-CM

## 2022-06-08 RX ORDER — METHENAMINE HIPPURATE 1 G/1
1 TABLET ORAL TWICE DAILY
Qty: 60 | Refills: 5 | Status: ACTIVE | COMMUNITY
Start: 2022-06-08 | End: 1900-01-01

## 2022-06-09 ENCOUNTER — RX RENEWAL (OUTPATIENT)
Age: 67
End: 2022-06-09

## 2022-06-09 LAB
APPEARANCE: ABNORMAL
BACTERIA: ABNORMAL
BILIRUBIN URINE: NEGATIVE
BLOOD URINE: ABNORMAL
CALCIUM OXALATE CRYSTALS: ABNORMAL
COLOR: ABNORMAL
GLUCOSE QUALITATIVE U: NORMAL
HYALINE CASTS: 0 /LPF
KETONES URINE: NORMAL
LEUKOCYTE ESTERASE URINE: ABNORMAL
MICROSCOPIC-UA: NORMAL
NITRITE URINE: POSITIVE
PH URINE: 6
PROTEIN URINE: ABNORMAL
RED BLOOD CELLS URINE: 435 /HPF
SPECIFIC GRAVITY URINE: 1.03
SQUAMOUS EPITHELIAL CELLS: 2 /HPF
UROBILINOGEN URINE: NORMAL
WHITE BLOOD CELLS URINE: >720 /HPF

## 2022-06-13 LAB — BACTERIA UR CULT: NORMAL

## 2022-06-30 ENCOUNTER — RX RENEWAL (OUTPATIENT)
Age: 67
End: 2022-06-30

## 2022-07-19 ENCOUNTER — APPOINTMENT (OUTPATIENT)
Dept: UROLOGY | Facility: CLINIC | Age: 67
End: 2022-07-19

## 2022-07-29 ENCOUNTER — APPOINTMENT (OUTPATIENT)
Dept: FAMILY MEDICINE | Facility: CLINIC | Age: 67
End: 2022-07-29

## 2022-08-02 ENCOUNTER — APPOINTMENT (OUTPATIENT)
Dept: FAMILY MEDICINE | Facility: CLINIC | Age: 67
End: 2022-08-02

## 2022-08-02 VITALS
OXYGEN SATURATION: 98 % | SYSTOLIC BLOOD PRESSURE: 124 MMHG | WEIGHT: 210 LBS | BODY MASS INDEX: 35.85 KG/M2 | HEART RATE: 64 BPM | DIASTOLIC BLOOD PRESSURE: 80 MMHG | TEMPERATURE: 97.5 F | HEIGHT: 64 IN

## 2022-08-02 DIAGNOSIS — N20.0 CALCULUS OF KIDNEY: ICD-10-CM

## 2022-08-02 LAB — HBA1C MFR BLD HPLC: 6.4

## 2022-08-02 PROCEDURE — 99214 OFFICE O/P EST MOD 30 MIN: CPT | Mod: 25

## 2022-08-02 PROCEDURE — 83036 HEMOGLOBIN GLYCOSYLATED A1C: CPT | Mod: QW

## 2022-08-02 NOTE — ASSESSMENT
[Patient Optimized for Surgery] : Patient optimized for surgery [FreeTextEntry4] : There is no absolute medical contraindication to procedure at this time. Glucose has been elevated due to recent infection, pt currently on cefpodoxime. Pt will continue metformin and Januvia day of procedure. Pt taking lantus, will inc to 25 units qhs due to recent hyperglycemia. monitor glucose perioperatively. Pt has hx asthma, controlled, monitor for bronchospasm\par \par Fax to [facility]  [phone]

## 2022-08-02 NOTE — HISTORY OF PRESENT ILLNESS
[No Pertinent Cardiac History] : no history of aortic stenosis, atrial fibrillation, coronary artery disease, recent myocardial infarction, or implantable device/pacemaker [Asthma] : asthma [No Adverse Anesthesia Reaction] : no adverse anesthesia reaction in self or family member [Diabetes] : diabetes [(Patient denies any chest pain, claudication, dyspnea on exertion, orthopnea, palpitations or syncope)] : Patient denies any chest pain, claudication, dyspnea on exertion, orthopnea, palpitations or syncope [Moderate (4-6 METs)] : Moderate (4-6 METs) [Chronic Anticoagulation] : no chronic anticoagulation [Chronic Kidney Disease] : no chronic kidney disease [FreeTextEntry1] : cystoscopy laser lithotripsy ureteroscopy stone extraction [FreeTextEntry2] : 8/5/22 [FreeTextEntry3] : Epbaum [FreeTextEntry4] : Pt in office for medical clearance. Pt to go for procedure for renal stone. Pt denies chest pain, palpitations, dyspnea, fever, chills, nausea, or vomiting.\par Pt s/p hospitalization [facility] for obstructing R renal stone admitted 7/16/22. Pt had R renal stent placed 7/17/22 during hospitalization. Pt was discharged on 7/23/22. Pt was discharged on course of cefpodoxime.\par \par Pt has DM, last a1c done today in office 6.7. Glucose worsened since hospitalization. Pt has asthma has been stable, controlled on singulair and albuterol prn. Pt has hx Crohn's controlled on mesalamine. Pt has hx seizure d/o, controlled on oxcarbazepine, no recent seizures.

## 2022-08-02 NOTE — RESULTS/DATA
[] : results reviewed [de-identified] : 10.3 chronic anemia stable [de-identified] : UA +blood, nit, leuk [de-identified] : EKG- NSR 84 bpm no st t changes with PVCs

## 2022-08-02 NOTE — PHYSICAL EXAM
[Normal Oropharynx] : the oropharynx was normal [Soft] : abdomen soft [Non-distended] : non-distended [Normal] : affect was normal and insight and judgment were intact [de-identified] : +rectus diastasis

## 2022-10-01 NOTE — REASON FOR VISIT
[Initial] : an initial consultation for As per the grand-daughter of the pt, c/o syncope and drowsiness after taking x2 tabs of her prescribed Oxycodone yesterday evening. The grand-daughter admits the pt unconscious for about x3 minutes. Pt denies all other symptoms at this time.

## 2022-11-05 NOTE — ASU PATIENT PROFILE, ADULT - VISION (WITH CORRECTIVE LENSES IF THE PATIENT USUALLY WEARS THEM):
Attending Attestation (For Attendings USE Only)... Normal vision: sees adequately in most situations; can see medication labels, newsprint

## 2022-11-22 NOTE — HISTORY OF PRESENT ILLNESS
Topical Clindamycin Pregnancy And Lactation Text: This medication is Pregnancy Category B and is considered safe during pregnancy. It is unknown if it is excreted in breast milk.
Spironolactone Pregnancy And Lactation Text: This medication can cause feminization of the male fetus and should be avoided during pregnancy. The active metabolite is also found in breast milk.
Azithromycin Counseling:  I discussed with the patient the risks of azithromycin including but not limited to GI upset, allergic reaction, drug rash, diarrhea, and yeast infections.
[FreeTextEntry1] : 64 yo female presents for gross hematuria. \par Continues to have gross hematuria and dysuria. \par With Myrbetriq 50 mg less frequency, every 4-5 hours otherwise every 2 hours, less urinary incontinence. \par \par Had seen Gynecology and has been ruled out for Gyn issues. \par \par Status post Cystoscopy and bladder biopsy with fulguration done on 4/12/21 at [facility].\par Pathology: patchy inflammation.\par \par Recently seen for gross hematuria. \par Had been having off and on gross hematuria for last 1 year, was being treated with Antibiotics. \par Last urine negative for infection but still had gross hematuria. \par Complained of off and on suprapubic pain and dysuria.  Has chronic back pain. \par Urinates every 3  hours or so during the day and night every 1-2 hours or so. \par Denied nausea, vomiting, fever, chills or rigors. \par Past smoker: < 1PPD for 20 years, quit 40 years ago.\par \par Initially seen for Ureteral stone.  \par Passed ureteral stone in 2018, did not follow up.\par Went to [facility] with severe Lt Flank pain, some nausea and vomiting. \par Had CT scan was told has kidney stone and sent home on Flomax, Pain meds. \par Did not see the stone pass. Pain has decreased but still needed pain meds off and on and was taking Flomax BID. Had chronic back pain, few weeks before the ER visit had been having back pain and passed 2 stones. No prior history of Kidney stone. \par Since ER visit had been urinating every hour or so with urgency. Saw some blood 2 days ago. \par \par 
Dapsone Pregnancy And Lactation Text: This medication is Pregnancy Category C and is not considered safe during pregnancy or breast feeding.
Winlevi Pregnancy And Lactation Text: This medication is considered safe during pregnancy and breastfeeding.
High Dose Vitamin A Counseling: Side effects reviewed, pt to contact office should one occur.
Aklief Pregnancy And Lactation Text: It is unknown if this medication is safe to use during pregnancy.  It is unknown if this medication is excreted in breast milk.  Breastfeeding women should use the topical cream on the smallest area of the skin for the shortest time needed while breastfeeding.  Do not apply to nipple and areola.
Bactrim Counseling:  I discussed with the patient the risks of sulfa antibiotics including but not limited to GI upset, allergic reaction, drug rash, diarrhea, dizziness, photosensitivity, and yeast infections.  Rarely, more serious reactions can occur including but not limited to aplastic anemia, agranulocytosis, methemoglobinemia, blood dyscrasias, liver or kidney failure, lung infiltrates or desquamative/blistering drug rashes.
Bactrim Pregnancy And Lactation Text: This medication is Pregnancy Category D and is known to cause fetal risk.  It is also excreted in breast milk.
Birth Control Pills Counseling: Birth Control Pill Counseling: I discussed with the patient the potential side effects of OCPs including but not limited to increased risk of stroke, heart attack, thrombophlebitis, deep venous thrombosis, hepatic adenomas, breast changes, GI upset, headaches, and depression.  The patient verbalized understanding of the proper use and possible adverse effects of OCPs. All of the patient's questions and concerns were addressed.
Winlevi Counseling:  I discussed with the patient the risks of topical clascoterone including but not limited to erythema, scaling, itching, and stinging. Patient voiced their understanding.
Azelaic Acid Counseling: Patient counseled that medicine may cause skin irritation and to avoid applying near the eyes.  In the event of skin irritation, the patient was advised to reduce the amount of the drug applied or use it less frequently.   The patient verbalized understanding of the proper use and possible adverse effects of azelaic acid.  All of the patient's questions and concerns were addressed.
Include Pregnancy/Lactation Warning?: No
Minocycline Pregnancy And Lactation Text: This medication is Pregnancy Category D and not consider safe during pregnancy. It is also excreted in breast milk.
Topical Retinoid Pregnancy And Lactation Text: This medication is Pregnancy Category C. It is unknown if this medication is excreted in breast milk.
High Dose Vitamin A Pregnancy And Lactation Text: High dose vitamin A therapy is contraindicated during pregnancy and breast feeding.
Doxycycline Counseling:  Patient counseled regarding possible photosensitivity and increased risk for sunburn.  Patient instructed to avoid sunlight, if possible.  When exposed to sunlight, patients should wear protective clothing, sunglasses, and sunscreen.  The patient was instructed to call the office immediately if the following severe adverse effects occur:  hearing changes, easy bruising/bleeding, severe headache, or vision changes.  The patient verbalized understanding of the proper use and possible adverse effects of doxycycline.  All of the patient's questions and concerns were addressed.
Tetracycline Counseling: Patient counseled regarding possible photosensitivity and increased risk for sunburn.  Patient instructed to avoid sunlight, if possible.  When exposed to sunlight, patients should wear protective clothing, sunglasses, and sunscreen.  The patient was instructed to call the office immediately if the following severe adverse effects occur:  hearing changes, easy bruising/bleeding, severe headache, or vision changes.  The patient verbalized understanding of the proper use and possible adverse effects of tetracycline.  All of the patient's questions and concerns were addressed. Patient understands to avoid pregnancy while on therapy due to potential birth defects.
Azelaic Acid Pregnancy And Lactation Text: This medication is considered safe during pregnancy and breast feeding.
Topical Sulfur Applications Counseling: Topical Sulfur Counseling: Patient counseled that this medication may cause skin irritation or allergic reactions.  In the event of skin irritation, the patient was advised to reduce the amount of the drug applied or use it less frequently.   The patient verbalized understanding of the proper use and possible adverse effects of topical sulfur application.  All of the patient's questions and concerns were addressed.
Tazorac Pregnancy And Lactation Text: This medication is not safe during pregnancy. It is unknown if this medication is excreted in breast milk.
Spironolactone Counseling: Patient advised regarding risks of diarrhea, abdominal pain, hyperkalemia, birth defects (for female patients), liver toxicity and renal toxicity. The patient may need blood work to monitor liver and kidney function and potassium levels while on therapy. The patient verbalized understanding of the proper use and possible adverse effects of spironolactone.  All of the patient's questions and concerns were addressed.
Topical Clindamycin Counseling: Patient counseled that this medication may cause skin irritation or allergic reactions.  In the event of skin irritation, the patient was advised to reduce the amount of the drug applied or use it less frequently.   The patient verbalized understanding of the proper use and possible adverse effects of clindamycin.  All of the patient's questions and concerns were addressed.
Benzoyl Peroxide Counseling: Patient counseled that medicine may cause skin irritation and bleach clothing.  In the event of skin irritation, the patient was advised to reduce the amount of the drug applied or use it less frequently.   The patient verbalized understanding of the proper use and possible adverse effects of benzoyl peroxide.  All of the patient's questions and concerns were addressed.
Aklief counseling:  Patient advised to apply a pea-sized amount only at bedtime and wait 30 minutes after washing their face before applying.  If too drying, patient may add a non-comedogenic moisturizer.  The most commonly reported side effects including irritation, redness, scaling, dryness, stinging, burning, itching, and increased risk of sunburn.  The patient verbalized understanding of the proper use and possible adverse effects of retinoids.  All of the patient's questions and concerns were addressed.
Erythromycin Counseling:  I discussed with the patient the risks of erythromycin including but not limited to GI upset, allergic reaction, drug rash, diarrhea, increase in liver enzymes, and yeast infections.
Azithromycin Pregnancy And Lactation Text: This medication is considered safe during pregnancy and is also secreted in breast milk.
Topical Retinoid counseling:  Patient advised to apply a pea-sized amount only at bedtime and wait 30 minutes after washing their face before applying.  If too drying, patient may add a non-comedogenic moisturizer. The patient verbalized understanding of the proper use and possible adverse effects of retinoids.  All of the patient's questions and concerns were addressed.
Topical Sulfur Applications Pregnancy And Lactation Text: This medication is Pregnancy Category C and has an unknown safety profile during pregnancy. It is unknown if this topical medication is excreted in breast milk.
Isotretinoin Counseling: Patient should get monthly blood tests, not donate blood, not drive at night if vision affected, not share medication, and not undergo elective surgery for 6 months after tx completed. Side effects reviewed, pt to contact office should one occur.
Doxycycline Pregnancy And Lactation Text: This medication is Pregnancy Category D and not consider safe during pregnancy. It is also excreted in breast milk but is considered safe for shorter treatment courses.
Erythromycin Pregnancy And Lactation Text: This medication is Pregnancy Category B and is considered safe during pregnancy. It is also excreted in breast milk.
Benzoyl Peroxide Pregnancy And Lactation Text: This medication is Pregnancy Category C. It is unknown if benzoyl peroxide is excreted in breast milk.
Birth Control Pills Pregnancy And Lactation Text: This medication should be avoided if pregnant and for the first 30 days post-partum.
Detail Level: Detailed
Sarecycline Counseling: Patient advised regarding possible photosensitivity and discoloration of the teeth, skin, lips, tongue and gums.  Patient instructed to avoid sunlight, if possible.  When exposed to sunlight, patients should wear protective clothing, sunglasses, and sunscreen.  The patient was instructed to call the office immediately if the following severe adverse effects occur:  hearing changes, easy bruising/bleeding, severe headache, or vision changes.  The patient verbalized understanding of the proper use and possible adverse effects of sarecycline.  All of the patient's questions and concerns were addressed.
Minocycline Counseling: Patient advised regarding possible photosensitivity and discoloration of the teeth, skin, lips, tongue and gums.  Patient instructed to avoid sunlight, if possible.  When exposed to sunlight, patients should wear protective clothing, sunglasses, and sunscreen.  The patient was instructed to call the office immediately if the following severe adverse effects occur:  hearing changes, easy bruising/bleeding, severe headache, or vision changes.  The patient verbalized understanding of the proper use and possible adverse effects of minocycline.  All of the patient's questions and concerns were addressed.
Tazorac Counseling:  Patient advised that medication is irritating and drying.  Patient may need to apply sparingly and wash off after an hour before eventually leaving it on overnight.  The patient verbalized understanding of the proper use and possible adverse effects of tazorac.  All of the patient's questions and concerns were addressed.
Dapsone Counseling: I discussed with the patient the risks of dapsone including but not limited to hemolytic anemia, agranulocytosis, rashes, methemoglobinemia, kidney failure, peripheral neuropathy, headaches, GI upset, and liver toxicity.  Patients who start dapsone require monitoring including baseline LFTs and weekly CBCs for the first month, then every month thereafter.  The patient verbalized understanding of the proper use and possible adverse effects of dapsone.  All of the patient's questions and concerns were addressed.
Isotretinoin Pregnancy And Lactation Text: This medication is Pregnancy Category X and is considered extremely dangerous during pregnancy. It is unknown if it is excreted in breast milk.

## 2022-12-01 ENCOUNTER — APPOINTMENT (OUTPATIENT)
Dept: ENDOCRINOLOGY | Facility: CLINIC | Age: 67
End: 2022-12-01

## 2023-01-05 ENCOUNTER — APPOINTMENT (OUTPATIENT)
Dept: CARDIOLOGY | Facility: CLINIC | Age: 68
End: 2023-01-05
Payer: MEDICARE

## 2023-01-05 ENCOUNTER — NON-APPOINTMENT (OUTPATIENT)
Age: 68
End: 2023-01-05

## 2023-01-05 VITALS
BODY MASS INDEX: 32.44 KG/M2 | WEIGHT: 190 LBS | HEIGHT: 64 IN | RESPIRATION RATE: 14 BRPM | HEART RATE: 85 BPM | DIASTOLIC BLOOD PRESSURE: 68 MMHG | SYSTOLIC BLOOD PRESSURE: 110 MMHG

## 2023-01-05 PROCEDURE — 93306 TTE W/DOPPLER COMPLETE: CPT

## 2023-01-05 PROCEDURE — 93000 ELECTROCARDIOGRAM COMPLETE: CPT

## 2023-01-05 PROCEDURE — 93010 ELECTROCARDIOGRAM REPORT: CPT

## 2023-01-05 PROCEDURE — 99214 OFFICE O/P EST MOD 30 MIN: CPT | Mod: 25

## 2023-01-05 RX ORDER — METOPROLOL SUCCINATE 25 MG/1
25 TABLET, EXTENDED RELEASE ORAL DAILY
Qty: 90 | Refills: 0 | Status: DISCONTINUED | COMMUNITY
Start: 2022-03-10 | End: 2023-01-05

## 2023-01-05 RX ORDER — SITAGLIPTIN 100 MG/1
100 TABLET, FILM COATED ORAL
Qty: 90 | Refills: 0 | Status: DISCONTINUED | COMMUNITY
Start: 2019-08-13 | End: 2023-01-05

## 2023-02-06 ENCOUNTER — APPOINTMENT (OUTPATIENT)
Dept: FAMILY MEDICINE | Facility: CLINIC | Age: 68
End: 2023-02-06

## 2023-02-13 ENCOUNTER — LABORATORY RESULT (OUTPATIENT)
Age: 68
End: 2023-02-13

## 2023-02-13 ENCOUNTER — APPOINTMENT (OUTPATIENT)
Dept: FAMILY MEDICINE | Facility: CLINIC | Age: 68
End: 2023-02-13
Payer: MEDICARE

## 2023-02-13 VITALS
HEIGHT: 64 IN | WEIGHT: 190 LBS | BODY MASS INDEX: 32.44 KG/M2 | HEART RATE: 70 BPM | DIASTOLIC BLOOD PRESSURE: 70 MMHG | OXYGEN SATURATION: 94 % | TEMPERATURE: 98.4 F | SYSTOLIC BLOOD PRESSURE: 115 MMHG

## 2023-02-13 LAB — GLUCOSE BLDC GLUCOMTR-MCNC: 266

## 2023-02-13 PROCEDURE — 99214 OFFICE O/P EST MOD 30 MIN: CPT | Mod: 25

## 2023-02-13 PROCEDURE — 82962 GLUCOSE BLOOD TEST: CPT

## 2023-02-13 RX ORDER — GLIMEPIRIDE 4 MG/1
4 TABLET ORAL
Qty: 90 | Refills: 0 | Status: COMPLETED | COMMUNITY
Start: 2019-08-31 | End: 2023-02-13

## 2023-02-13 RX ORDER — SEMAGLUTIDE 1.34 MG/ML
2 INJECTION, SOLUTION SUBCUTANEOUS
Qty: 1 | Refills: 0 | Status: COMPLETED | COMMUNITY
Start: 2022-02-22 | End: 2023-02-13

## 2023-02-15 ENCOUNTER — NON-APPOINTMENT (OUTPATIENT)
Age: 68
End: 2023-02-15

## 2023-02-15 ENCOUNTER — APPOINTMENT (OUTPATIENT)
Dept: CARDIOLOGY | Facility: CLINIC | Age: 68
End: 2023-02-15
Payer: MEDICARE

## 2023-02-15 VITALS
HEIGHT: 64 IN | OXYGEN SATURATION: 97 % | WEIGHT: 198 LBS | SYSTOLIC BLOOD PRESSURE: 130 MMHG | HEART RATE: 104 BPM | BODY MASS INDEX: 33.8 KG/M2 | DIASTOLIC BLOOD PRESSURE: 70 MMHG

## 2023-02-15 DIAGNOSIS — R00.2 PALPITATIONS: ICD-10-CM

## 2023-02-15 DIAGNOSIS — R94.01 ABNORMAL ELECTROENCEPHALOGRAM [EEG]: ICD-10-CM

## 2023-02-15 PROCEDURE — 93000 ELECTROCARDIOGRAM COMPLETE: CPT | Mod: 59

## 2023-02-15 PROCEDURE — 93242 EXT ECG>48HR<7D RECORDING: CPT

## 2023-02-15 PROCEDURE — 99214 OFFICE O/P EST MOD 30 MIN: CPT | Mod: 25

## 2023-02-15 NOTE — DISCUSSION/SUMMARY
[FreeTextEntry1] : Pt is a 66 y/o F PMH DM, hypothyroidism, asthma.  08/2022 admitted for urosepsis with complicated course - prolonged intubation, DVT/PE, toe amputation required.  She is now on amio, Eliquis, midodrine.  She is feeling better overall \par \par TTE 04/2018 normal LV function, mild DD, mild PI\par TTE 04/2021 normal LV function without significant valvular pathology\par Nuclear stress test 09/2020 breast and diaphragm attenuation.  Small, mild defect in anterior wall that is reversible suggestive of mild ischemia, EF 66%\par Medical management\par \par LE edema:\par check TTE\par start lasix 20mg qd\par \par hypotension:\par c/w midodrine\par \par DM:\par follows with PCP\par c/w current meds\par goal A1c <7\par Advised lifestyle modifications \par VSS\par \par Pt will return in 1 mnths or sooner as needed but I encouraged communication via phone or portal if necessary. \par The described plan was discussed with the pt.  All questions and concerns were addressed to the best of my knowledge.\par

## 2023-02-15 NOTE — PHYSICAL EXAM
[Well Developed] : well developed [Well Nourished] : well nourished [No Acute Distress] : no acute distress [Normal Conjunctiva] : normal conjunctiva [Normal Venous Pressure] : normal venous pressure [No Carotid Bruit] : no carotid bruit [Normal S1, S2] : normal S1, S2 [No Murmur] : no murmur [No Rub] : no rub [No Gallop] : no gallop [Clear Lung Fields] : clear lung fields [Good Air Entry] : good air entry [No Respiratory Distress] : no respiratory distress  [Soft] : abdomen soft [Non Tender] : non-tender [No Masses/organomegaly] : no masses/organomegaly [Normal Bowel Sounds] : normal bowel sounds [No Edema] : no edema [No Cyanosis] : no cyanosis [No Clubbing] : no clubbing [No Varicosities] : no varicosities [No Rash] : no rash [No Skin Lesions] : no skin lesions [Moves all extremities] : moves all extremities [No Focal Deficits] : no focal deficits [Normal Speech] : normal speech [Alert and Oriented] : alert and oriented [Normal memory] : normal memory [de-identified] : mild LE edema

## 2023-02-15 NOTE — DISCUSSION/SUMMARY
[FreeTextEntry1] : Pt is a 66 y/o F PMH DM, hypothyroidism, asthma.  08/2022 admitted for urosepsis with complicated course - prolonged intubation, DVT/PE, toe amputation required.  She is now on Eliquis.  Today she is c/o palpitations \par \par TTE 04/2018 normal LV function, mild DD, mild PI\par TTE 04/2021 normal LV function without significant valvular pathology\par Nuclear stress test 09/2020 breast and diaphragm attenuation.  Small, mild defect in anterior wall that is reversible suggestive of mild ischemia, EF 66%\par Medical management\par \par palps:\par Given pt's symptoms will check jarrett monitor to assess for ectopy.  Advised adequate hydration.  Drug use, OTC medication use, caffeine consumption reviewed \par \par LE edema:\par restart lasix 20mg\par \par hypotension:\par she is not sure if she is taking midodrine anymore\par advised to bring a list of meds next OV\par BP stable\par \par DM:\par follows with PCP\par c/w current meds\par goal A1c <7\par Advised lifestyle modifications \par VSS\par \par Pt will return in 4 mnths or sooner as needed but I encouraged communication via phone or portal if necessary. \par The described plan was discussed with the pt.  All questions and concerns were addressed to the best of my knowledge.\par

## 2023-02-15 NOTE — REVIEW OF SYSTEMS
[Negative] : Heme/Lymph [SOB] : no shortness of breath [Dyspnea on exertion] : not dyspnea during exertion [Chest Discomfort] : no chest discomfort [Lower Ext Edema] : lower extremity edema [Leg Claudication] : no intermittent leg claudication [Palpitations] : no palpitations [Orthopnea] : no orthopnea [Syncope] : no syncope

## 2023-02-15 NOTE — HISTORY OF PRESENT ILLNESS
[FreeTextEntry1] : Pt is a 66 y/o F PMH DM, hypothyroidism, asthma. \par 08/2022 admitted for urosepsis with complicated course - prolonged intubation, DVT/PE, toe amputation required.  She is now on amio, Eliquis, midodrine.  She is feeling better overall, notes LE edema\par Cr 0.69 12/30/2022\par COVID+ 01/2021 hospitalized for 1 mnth at [facility]\par \par CCTA 06/2022 Ca score = 0, LAD 10%\par TTE 04/2018 normal LV function, mild DD, mild PI\par TTE 04/2021 normal LV function without significant valvular pathology\par Nuclear stress test 09/2020 breast and diaphragm attenuation.  Small, mild defect in anterior wall that is reversible suggestive of mild ischemia, EF 66%\par jarrett 02/2022 showed PVCs 4.6%\par \par PMH: DM, hypothyroidism, asthma, nephrolithiasis, gastric bypass\par Smoking status: quit 16 yrs ago\par Current exercise: none\par Daily water intake: 32 oz\par Daily caffeine intake: 1 cup coffee\par OTC medications: see list\par Family hx: mother and father stroke, mother PPM, father ?MI at age 70\par Previous hospitalizations: knee and back surgery\par

## 2023-02-15 NOTE — HISTORY OF PRESENT ILLNESS
[FreeTextEntry1] : Pt is a 66 y/o F PMH DM, hypothyroidism, asthma. \par 08/2022 admitted for urosepsis with complicated course - prolonged intubation, DVT/PE, toe amputation required.  She is now on amio, Eliquis\par She had to go back to the hospital ([facility]) last week for 24hrs because she hit her head.  \par Today she c/o palpitations and she has stopped lasix and swelling has come back\par Cr 0.61 02/2023\par COVID+ 01/2021 hospitalized for 1 mnth at [facility]\par \par CCTA 06/2022 Ca score = 0, LAD 10%\par TTE 04/2018 normal LV function, mild DD, mild PI\par TTE 04/2021 normal LV function without significant valvular pathology\par TTE 01/2023 EF 67%, mild MR/TR/PI\par Nuclear stress test 09/2020 breast and diaphragm attenuation.  Small, mild defect in anterior wall that is reversible suggestive of mild ischemia, EF 66%\par jarrett 02/2022 showed PVCs 4.6%\par \par PMH: DM, hypothyroidism, asthma, nephrolithiasis, gastric bypass\par Smoking status: quit 16 yrs ago\par Current exercise: none\par Daily water intake: 32 oz\par Daily caffeine intake: 1 cup coffee\par OTC medications: see list\par Family hx: mother and father stroke, mother PPM, father ?MI at age 70\par Previous hospitalizations: knee and back surgery\par

## 2023-02-16 NOTE — ASSESSMENT
[FreeTextEntry1] : anxiety, depression - refilled meds\par anemia - recheck cbc\par DM- cont januvia, lantus, metformin. check a1c\par HLD - refill statin, check flp, hepatic function\par hx PE - cont eliquis\par hypothyroidism - check tfts, cont lt4.

## 2023-02-16 NOTE — HISTORY OF PRESENT ILLNESS
[de-identified] : Pt f/u multiple hospitalizations in  and [facility] in past 2 mos.\par Pt s/p sepsis in 08/2022. 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 \par Pt went to rehab and had to go be readmitted multiple times due to recurrent anemia. Pt hb 9.1 on 1/27/23 from [facility]. Pt now on aranesp. Pt had PE in hospital, now on eliquis. \par Pt had afib in hospital. Started on amio 200mg q day, and potassium 20meq bid

## 2023-02-16 NOTE — PHYSICAL EXAM
[Normal] : normal rate, regular rhythm, normal S1 and S2 and no murmur heard [Soft] : abdomen soft [Non Tender] : non-tender [de-identified] : amputated L 2nd, 4th and 5th toes and R toes 2-5

## 2023-02-21 LAB
ALBUMIN SERPL ELPH-MCNC: 4 G/DL
ALP BLD-CCNC: 104 U/L
ALT SERPL-CCNC: 16 U/L
ANION GAP SERPL CALC-SCNC: 13 MMOL/L
AST SERPL-CCNC: 12 U/L
BASOPHILS # BLD AUTO: 0.03 K/UL
BASOPHILS NFR BLD AUTO: 0.7 %
BILIRUB SERPL-MCNC: 0.4 MG/DL
BUN SERPL-MCNC: 16 MG/DL
CALCIUM SERPL-MCNC: 9.5 MG/DL
CHLORIDE SERPL-SCNC: 101 MMOL/L
CHOLEST SERPL-MCNC: 135 MG/DL
CO2 SERPL-SCNC: 27 MMOL/L
CREAT SERPL-MCNC: 0.61 MG/DL
EGFR: 99 ML/MIN/1.73M2
EOSINOPHIL # BLD AUTO: 0.06 K/UL
EOSINOPHIL NFR BLD AUTO: 1.3 %
ESTIMATED AVERAGE GLUCOSE: 120 MG/DL
FERRITIN SERPL-MCNC: 544 NG/ML
FOLATE SERPL-MCNC: 17.5 NG/ML
GLUCOSE SERPL-MCNC: 269 MG/DL
HBA1C MFR BLD HPLC: 5.8 %
HCT VFR BLD CALC: 31.7 %
HDLC SERPL-MCNC: 56 MG/DL
HGB BLD-MCNC: 10.2 G/DL
IMM GRANULOCYTES NFR BLD AUTO: 0.9 %
IRON SATN MFR SERPL: 29 %
IRON SERPL-MCNC: 78 UG/DL
LDLC SERPL CALC-MCNC: 52 MG/DL
LYMPHOCYTES # BLD AUTO: 1.16 K/UL
LYMPHOCYTES NFR BLD AUTO: 25.3 %
MAN DIFF?: NORMAL
MCHC RBC-ENTMCNC: 32.2 GM/DL
MCHC RBC-ENTMCNC: 34.5 PG
MCV RBC AUTO: 107.1 FL
MONOCYTES # BLD AUTO: 0.47 K/UL
MONOCYTES NFR BLD AUTO: 10.2 %
NEUTROPHILS # BLD AUTO: 2.83 K/UL
NEUTROPHILS NFR BLD AUTO: 61.6 %
NONHDLC SERPL-MCNC: 79 MG/DL
PLATELET # BLD AUTO: 124 K/UL
POTASSIUM SERPL-SCNC: 4 MMOL/L
PROT SERPL-MCNC: 6.1 G/DL
RBC # BLD: 2.96 M/UL
RBC # FLD: 14.4 %
SODIUM SERPL-SCNC: 141 MMOL/L
T4 FREE SERPL-MCNC: 1.3 NG/DL
TIBC SERPL-MCNC: 267 UG/DL
TRIGL SERPL-MCNC: 132 MG/DL
TSH SERPL-ACNC: 4.27 UIU/ML
UIBC SERPL-MCNC: 189 UG/DL
VIT B12 SERPL-MCNC: 1178 PG/ML
WBC # FLD AUTO: 4.59 K/UL

## 2023-03-09 ENCOUNTER — APPOINTMENT (OUTPATIENT)
Dept: FAMILY MEDICINE | Facility: CLINIC | Age: 68
End: 2023-03-09
Payer: MEDICARE

## 2023-03-09 VITALS
BODY MASS INDEX: 34.49 KG/M2 | WEIGHT: 202 LBS | OXYGEN SATURATION: 96 % | SYSTOLIC BLOOD PRESSURE: 119 MMHG | TEMPERATURE: 98.7 F | HEART RATE: 99 BPM | HEIGHT: 64 IN | DIASTOLIC BLOOD PRESSURE: 78 MMHG

## 2023-03-09 DIAGNOSIS — R30.0 DYSURIA: ICD-10-CM

## 2023-03-09 LAB
BILIRUB UR QL STRIP: NEGATIVE
GLUCOSE UR-MCNC: NEGATIVE
HCG UR QL: 0.2 EU/DL
HGB UR QL STRIP.AUTO: NEGATIVE
KETONES UR-MCNC: NORMAL
LEUKOCYTE ESTERASE UR QL STRIP: NORMAL
NITRITE UR QL STRIP: NEGATIVE
PH UR STRIP: 5.5
PROT UR STRIP-MCNC: 30
SP GR UR STRIP: 1.03

## 2023-03-09 PROCEDURE — 81003 URINALYSIS AUTO W/O SCOPE: CPT | Mod: QW

## 2023-03-09 PROCEDURE — 99214 OFFICE O/P EST MOD 30 MIN: CPT | Mod: 25

## 2023-03-12 NOTE — HISTORY OF PRESENT ILLNESS
[Family Member] : family member [de-identified] : Pt for f/u DM. Glucose 200-250 persistently. Pt eating too many fruits in her diet.\par Pt also c/o dysuria for past few days. Denies abd pain, flank pain, fever, cihlls.

## 2023-03-12 NOTE — ASSESSMENT
[FreeTextEntry1] : DM- add on humalog 15 units premeals. monitor glucose. f/u in 3-4 weeks\par dysuria - ua ok, send cx

## 2023-03-13 LAB — BACTERIA UR CULT: NORMAL

## 2023-03-14 ENCOUNTER — APPOINTMENT (OUTPATIENT)
Dept: DERMATOLOGY | Facility: CLINIC | Age: 68
End: 2023-03-14
Payer: MEDICARE

## 2023-03-14 PROCEDURE — 99213 OFFICE O/P EST LOW 20 MIN: CPT

## 2023-03-14 NOTE — ASSESSMENT
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

## 2023-03-14 NOTE — HISTORY OF PRESENT ILLNESS
[FreeTextEntry1] : Hair loss [de-identified] : Follow-up visit for 67-year-old white female last seen by me on February 24, 2022, with a 2-year history of marked hair loss and dystrophy of the nails.\par Patient previously diagnosed as having a telogen effluvium and Beau's lines, most probably secondary to prior COVID-19 infection and pneumonia occurred in January and February 2021.\par \par Patient previously treated with minoxidil 1.25 mg p.o. at night.\par This was increased to minoxidil 2.5 mg p.o. at night at the last visit -not currently on this.\par \par Patient also had an irritant hand dermatitis.  This was treated with:\par Start triamcinolone ointment 0.1% to hands 2-3 times a day as needed\par \par Status post a long hospitalization following surgery for kidney stones\par Patient was in a coma for 3 weeks..\par \par Patient complains of hair falling out again and new dystrophy of the fingernails which break easily\par \par Note: Patient has become hard of hearing -is in the process of getting new hearing aids

## 2023-03-14 NOTE — PHYSICAL EXAM
[Alert] : alert [Oriented x 3] : ~L oriented x 3 [Well Nourished] : well nourished [FreeTextEntry3] : Patient wearing a facemask\par \par Scalp: Mild diffuse thinning\par 0-3 hairs removed per gentle tug diffusely\par Fingernails: Covered with nail polish

## 2023-03-30 ENCOUNTER — OUTPATIENT (OUTPATIENT)
Dept: OUTPATIENT SERVICES | Facility: HOSPITAL | Age: 68
LOS: 1 days | End: 2023-03-30
Payer: MEDICARE

## 2023-03-30 ENCOUNTER — APPOINTMENT (OUTPATIENT)
Dept: CT IMAGING | Facility: CLINIC | Age: 68
End: 2023-03-30
Payer: MEDICARE

## 2023-03-30 DIAGNOSIS — D69.49 OTHER PRIMARY THROMBOCYTOPENIA: ICD-10-CM

## 2023-03-30 DIAGNOSIS — Z00.8 ENCOUNTER FOR OTHER GENERAL EXAMINATION: ICD-10-CM

## 2023-03-30 DIAGNOSIS — Z98.89 OTHER SPECIFIED POSTPROCEDURAL STATES: Chronic | ICD-10-CM

## 2023-03-30 DIAGNOSIS — D64.9 ANEMIA, UNSPECIFIED: ICD-10-CM

## 2023-03-30 DIAGNOSIS — D68.9 COAGULATION DEFECT, UNSPECIFIED: ICD-10-CM

## 2023-03-30 DIAGNOSIS — K46.9 UNSPECIFIED ABDOMINAL HERNIA WITHOUT OBSTRUCTION OR GANGRENE: Chronic | ICD-10-CM

## 2023-03-30 DIAGNOSIS — I26.99 OTHER PULMONARY EMBOLISM WITHOUT ACUTE COR PULMONALE: ICD-10-CM

## 2023-03-30 DIAGNOSIS — D69.6 THROMBOCYTOPENIA, UNSPECIFIED: ICD-10-CM

## 2023-03-30 DIAGNOSIS — Z41.1 ENCOUNTER FOR COSMETIC SURGERY: Chronic | ICD-10-CM

## 2023-03-30 DIAGNOSIS — Z90.49 ACQUIRED ABSENCE OF OTHER SPECIFIED PARTS OF DIGESTIVE TRACT: Chronic | ICD-10-CM

## 2023-03-30 DIAGNOSIS — Z96.653 PRESENCE OF ARTIFICIAL KNEE JOINT, BILATERAL: Chronic | ICD-10-CM

## 2023-03-30 DIAGNOSIS — L02.92 FURUNCLE, UNSPECIFIED: Chronic | ICD-10-CM

## 2023-03-30 DIAGNOSIS — Z98.84 BARIATRIC SURGERY STATUS: Chronic | ICD-10-CM

## 2023-03-30 DIAGNOSIS — M75.120 COMPLETE ROTATOR CUFF TEAR OR RUPTURE OF UNSPECIFIED SHOULDER, NOT SPECIFIED AS TRAUMATIC: Chronic | ICD-10-CM

## 2023-03-30 DIAGNOSIS — D50.0 IRON DEFICIENCY ANEMIA SECONDARY TO BLOOD LOSS (CHRONIC): ICD-10-CM

## 2023-03-30 PROCEDURE — 74177 CT ABD & PELVIS W/CONTRAST: CPT | Mod: MH

## 2023-03-30 PROCEDURE — 74177 CT ABD & PELVIS W/CONTRAST: CPT | Mod: 26,MH

## 2023-04-04 ENCOUNTER — APPOINTMENT (OUTPATIENT)
Dept: FAMILY MEDICINE | Facility: CLINIC | Age: 68
End: 2023-04-04
Payer: MEDICARE

## 2023-04-04 VITALS
OXYGEN SATURATION: 95 % | SYSTOLIC BLOOD PRESSURE: 124 MMHG | BODY MASS INDEX: 35 KG/M2 | HEIGHT: 64 IN | WEIGHT: 205 LBS | DIASTOLIC BLOOD PRESSURE: 80 MMHG | HEART RATE: 98 BPM | TEMPERATURE: 98 F

## 2023-04-04 DIAGNOSIS — H60.92 UNSPECIFIED OTITIS EXTERNA, LEFT EAR: ICD-10-CM

## 2023-04-04 PROCEDURE — 99214 OFFICE O/P EST MOD 30 MIN: CPT

## 2023-04-10 NOTE — ASSESSMENT
[FreeTextEntry1] : DM- inc humalog to 20 units premeals. monitor glucose. f/u in 3-4 weeks\par anxiety, dperession - inc amitriptyline ot 25mg q day. refill xanax prn.  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 Pt has DM\par Forms completed for diabetic shoes\par

## 2023-04-10 NOTE — HISTORY OF PRESENT ILLNESS
[Family Member] : family member [de-identified] : Pt for f/u DM. Glucose 210-220 fasting, 200-230 in PM persistently. Pt eating too many fruits in her diet. Pt on lantus 40 units qhs, humalog 15 units bid before breakfast and dinner.\par Pt c/o excess anxiety as she has recurrent renal stones b/l.\par Pt also c/o L ear pain when she puts in her hearing aid\par Pt was dx with another UTI, pt to be tx by her physician Dr Holden Eduardo. Denies abd pain, flank pain, fever, chills.\par Reviewed recent CT abd/pelvis

## 2023-04-10 NOTE — PHYSICAL EXAM
[Normal] : normal rate, regular rhythm, normal S1 and S2 and no murmur heard [Normal Insight/Judgement] : insight and judgment were intact [de-identified] : L ear canal erythema [de-identified] : anxious

## 2023-04-19 ENCOUNTER — RX RENEWAL (OUTPATIENT)
Age: 68
End: 2023-04-19

## 2023-04-25 ENCOUNTER — APPOINTMENT (OUTPATIENT)
Dept: FAMILY MEDICINE | Facility: CLINIC | Age: 68
End: 2023-04-25

## 2023-05-11 ENCOUNTER — NON-APPOINTMENT (OUTPATIENT)
Age: 68
End: 2023-05-11

## 2023-05-23 ENCOUNTER — RX RENEWAL (OUTPATIENT)
Age: 68
End: 2023-05-23

## 2023-05-30 ENCOUNTER — RX RENEWAL (OUTPATIENT)
Age: 68
End: 2023-05-30

## 2023-05-31 ENCOUNTER — NON-APPOINTMENT (OUTPATIENT)
Age: 68
End: 2023-05-31

## 2023-06-05 ENCOUNTER — APPOINTMENT (OUTPATIENT)
Dept: COLORECTAL SURGERY | Facility: CLINIC | Age: 68
End: 2023-06-05
Payer: MEDICARE

## 2023-06-05 ENCOUNTER — RX RENEWAL (OUTPATIENT)
Age: 68
End: 2023-06-05

## 2023-06-05 VITALS
BODY MASS INDEX: 33.59 KG/M2 | TEMPERATURE: 98.7 F | HEART RATE: 90 BPM | DIASTOLIC BLOOD PRESSURE: 87 MMHG | WEIGHT: 214 LBS | OXYGEN SATURATION: 97 % | RESPIRATION RATE: 14 BRPM | SYSTOLIC BLOOD PRESSURE: 130 MMHG | HEIGHT: 67 IN

## 2023-06-05 DIAGNOSIS — K52.9 NONINFECTIVE GASTROENTERITIS AND COLITIS, UNSPECIFIED: ICD-10-CM

## 2023-06-05 PROCEDURE — 99214 OFFICE O/P EST MOD 30 MIN: CPT

## 2023-06-07 NOTE — PHYSICAL EXAM
[Respiratory Effort] : normal respiratory effort [Normal Rate and Rhythm] : normal rate and rhythm [Calm] : calm [de-identified] : soft, non tender, surgical scars noted [de-identified] : well appearing, in no distress [de-identified] : normocephalic, atraumatic [de-identified] : moves extremities without difficulty [de-identified] : warm and dry [de-identified] : Alert and oriented

## 2023-06-07 NOTE — HISTORY OF PRESENT ILLNESS
[FreeTextEntry1] : 67 year old female with a history of crohns disease who presents for a follow up visit. She had a colonoscopy in 2021 with EGD and recommendation was to repeat in 2 years. She presents for follow up and had a complicated past year. She had a procedure for kidney stones at [facility] that resulted in sepsis and coma and she was there for several weeks. 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 She was then discharged and rehospitalized at [facility]. She had upper and lower endoscopy there but does not know the findings. She is still on Mesalamine for Crohns and her only GI complaints is of epigastric pain and reflux, No diarrhea.

## 2023-06-19 ENCOUNTER — APPOINTMENT (OUTPATIENT)
Dept: CARDIOLOGY | Facility: CLINIC | Age: 68
End: 2023-06-19
Payer: MEDICARE

## 2023-06-19 ENCOUNTER — NON-APPOINTMENT (OUTPATIENT)
Age: 68
End: 2023-06-19

## 2023-06-19 VITALS
WEIGHT: 219 LBS | DIASTOLIC BLOOD PRESSURE: 80 MMHG | BODY MASS INDEX: 34.37 KG/M2 | HEART RATE: 90 BPM | SYSTOLIC BLOOD PRESSURE: 132 MMHG | HEIGHT: 67 IN | OXYGEN SATURATION: 97 %

## 2023-06-19 PROCEDURE — 93000 ELECTROCARDIOGRAM COMPLETE: CPT

## 2023-06-19 PROCEDURE — 99214 OFFICE O/P EST MOD 30 MIN: CPT | Mod: 25

## 2023-06-19 RX ORDER — AMIODARONE HYDROCHLORIDE 200 MG/1
200 TABLET ORAL
Qty: 90 | Refills: 0 | Status: DISCONTINUED | COMMUNITY
Start: 2023-02-16 | End: 2023-06-19

## 2023-06-19 NOTE — PHYSICAL EXAM
[Well Developed] : well developed [Well Nourished] : well nourished [No Acute Distress] : no acute distress [Normal Conjunctiva] : normal conjunctiva [Normal Venous Pressure] : normal venous pressure [No Carotid Bruit] : no carotid bruit [Normal S1, S2] : normal S1, S2 [No Murmur] : no murmur [No Rub] : no rub [No Gallop] : no gallop [Clear Lung Fields] : clear lung fields [Good Air Entry] : good air entry [No Respiratory Distress] : no respiratory distress  [Soft] : abdomen soft [Non Tender] : non-tender [No Masses/organomegaly] : no masses/organomegaly [Normal Bowel Sounds] : normal bowel sounds [No Edema] : no edema [No Cyanosis] : no cyanosis [No Clubbing] : no clubbing [No Varicosities] : no varicosities [No Rash] : no rash [No Skin Lesions] : no skin lesions [Moves all extremities] : moves all extremities [No Focal Deficits] : no focal deficits [Normal Speech] : normal speech [Alert and Oriented] : alert and oriented [Normal memory] : normal memory [de-identified] : mild LE edema

## 2023-06-19 NOTE — HISTORY OF PRESENT ILLNESS
[FreeTextEntry1] : Pt is a 66 y/o F PMH DM, hypothyroidism, asthma. \par 08/2022 admitted for urosepsis with complicated course - prolonged intubation, DVT/PE, toe amputation required.  She is now on Eliquis\par She had to go back to the hospital ([facility]) 02/2023 for 24hrs because she hit her head.  \par Today she notes that she is not very active and has gained weight\par She tells me that has been taking amiodarone as needed - she takes it every third day sometimes - she is a poor historian \par Cr 0.61 02/2023\par COVID+ 01/2021 hospitalized for 1 mnth at [facility]\par \par CCTA 06/2022 Ca score = 0, LAD 10%\par TTE 04/2018 normal LV function, mild DD, mild PI\par TTE 04/2021 normal LV function without significant valvular pathology\par TTE 02/2023 EF 67%, mild MR/TR/PI\par Nuclear stress test 09/2020 breast and diaphragm attenuation.  Small, mild defect in anterior wall that is reversible suggestive of mild ischemia, EF 66%\par jarrett 02/2022 showed PVCs 4.6%\par jarrett 02/2023 NSR without significant ectopy (PVCs <1%)\par \par PMH: DM, hypothyroidism, asthma, nephrolithiasis, gastric bypass\par hematologist - Dr Eduardo\par Smoking status: quit 16 yrs ago\par Current exercise: none\par Daily water intake: 32 oz\par Daily caffeine intake: 1 cup coffee\par OTC medications: see list\par Family hx: mother and father stroke, mother PPM, father ?MI at age 70\par Previous hospitalizations: knee and back surgery\par

## 2023-06-19 NOTE — DISCUSSION/SUMMARY
[FreeTextEntry1] : Pt is a 68 y/o F PMH DM, hypothyroidism, asthma.  08/2022 admitted for urosepsis with complicated course - prolonged intubation, DVT/PE, toe amputation required.  She is now on Eliquis.\par \par TTE 04/2018 normal LV function, mild DD, mild PI\par TTE 04/2021 normal LV function without significant valvular pathology\par Nuclear stress test 09/2020 breast and diaphragm attenuation.  Small, mild defect in anterior wall that is reversible suggestive of mild ischemia, EF 66%\par Medical management\par \par LE edema:\par c/w lasix 20mg\par \par hypotension:\par BP very stable  - will try to decrease midodrine 2.5mg bid to qd and hopefully d/c\par \par Pt is on amiodarone after prolonged hospitalization\par I am assuming she had parox AF at the time\par Her jarrett has not shown any arrhythmias\par I advised that she stop amio (had been taking PRN, unclear why)\par \par DM:\par follows with PCP\par c/w current meds\par goal A1c <7\par Advised lifestyle modifications \par VSS\par \par HLD:\par c/w statin\par goal LDL <70\par Advised lifestyle modifications \par \par DVT/PE:\par on Eliquis\par following with hem\par \par toe amputation:\par refer to vascular\par \par Pt will return in 6 mnths or sooner as needed but I encouraged communication via phone or portal if necessary. \par The described plan was discussed with the pt.  All questions and concerns were addressed to the best of my knowledge.\par

## 2023-07-05 LAB — HBA1C MFR BLD HPLC: 7

## 2023-07-09 ENCOUNTER — EMERGENCY (EMERGENCY)
Facility: HOSPITAL | Age: 68
LOS: 1 days | Discharge: HOME | End: 2023-07-09
Attending: EMERGENCY MEDICINE
Payer: MEDICARE

## 2023-07-09 VITALS
DIASTOLIC BLOOD PRESSURE: 47 MMHG | RESPIRATION RATE: 19 BRPM | SYSTOLIC BLOOD PRESSURE: 113 MMHG | HEART RATE: 85 BPM | OXYGEN SATURATION: 95 %

## 2023-07-09 VITALS
HEART RATE: 86 BPM | HEIGHT: 65 IN | OXYGEN SATURATION: 95 % | DIASTOLIC BLOOD PRESSURE: 76 MMHG | SYSTOLIC BLOOD PRESSURE: 135 MMHG | RESPIRATION RATE: 20 BRPM | TEMPERATURE: 98 F | WEIGHT: 214.95 LBS

## 2023-07-09 DIAGNOSIS — Z90.49 ACQUIRED ABSENCE OF OTHER SPECIFIED PARTS OF DIGESTIVE TRACT: Chronic | ICD-10-CM

## 2023-07-09 DIAGNOSIS — Z98.89 OTHER SPECIFIED POSTPROCEDURAL STATES: Chronic | ICD-10-CM

## 2023-07-09 DIAGNOSIS — Z41.1 ENCOUNTER FOR COSMETIC SURGERY: Chronic | ICD-10-CM

## 2023-07-09 DIAGNOSIS — M75.120 COMPLETE ROTATOR CUFF TEAR OR RUPTURE OF UNSPECIFIED SHOULDER, NOT SPECIFIED AS TRAUMATIC: Chronic | ICD-10-CM

## 2023-07-09 DIAGNOSIS — Z96.653 PRESENCE OF ARTIFICIAL KNEE JOINT, BILATERAL: Chronic | ICD-10-CM

## 2023-07-09 DIAGNOSIS — L02.92 FURUNCLE, UNSPECIFIED: Chronic | ICD-10-CM

## 2023-07-09 DIAGNOSIS — Z98.84 BARIATRIC SURGERY STATUS: Chronic | ICD-10-CM

## 2023-07-09 DIAGNOSIS — K46.9 UNSPECIFIED ABDOMINAL HERNIA WITHOUT OBSTRUCTION OR GANGRENE: Chronic | ICD-10-CM

## 2023-07-09 LAB
ALBUMIN SERPL ELPH-MCNC: 4 G/DL — SIGNIFICANT CHANGE UP (ref 3.3–5.2)
ALP SERPL-CCNC: 100 U/L — SIGNIFICANT CHANGE UP (ref 40–120)
ALT FLD-CCNC: 19 U/L — SIGNIFICANT CHANGE UP
ANION GAP SERPL CALC-SCNC: 17 MMOL/L — SIGNIFICANT CHANGE UP (ref 5–17)
AST SERPL-CCNC: 16 U/L — SIGNIFICANT CHANGE UP
BASOPHILS # BLD AUTO: 0.04 K/UL — SIGNIFICANT CHANGE UP (ref 0–0.2)
BASOPHILS NFR BLD AUTO: 0.6 % — SIGNIFICANT CHANGE UP (ref 0–2)
BILIRUB SERPL-MCNC: 0.2 MG/DL — LOW (ref 0.4–2)
BUN SERPL-MCNC: 14.6 MG/DL — SIGNIFICANT CHANGE UP (ref 8–20)
CALCIUM SERPL-MCNC: 9 MG/DL — SIGNIFICANT CHANGE UP (ref 8.4–10.5)
CHLORIDE SERPL-SCNC: 102 MMOL/L — SIGNIFICANT CHANGE UP (ref 96–108)
CO2 SERPL-SCNC: 22 MMOL/L — SIGNIFICANT CHANGE UP (ref 22–29)
CREAT SERPL-MCNC: 0.65 MG/DL — SIGNIFICANT CHANGE UP (ref 0.5–1.3)
EGFR: 98 ML/MIN/1.73M2 — SIGNIFICANT CHANGE UP
EOSINOPHIL # BLD AUTO: 0.11 K/UL — SIGNIFICANT CHANGE UP (ref 0–0.5)
EOSINOPHIL NFR BLD AUTO: 1.8 % — SIGNIFICANT CHANGE UP (ref 0–6)
GLUCOSE SERPL-MCNC: 304 MG/DL — HIGH (ref 70–99)
HCT VFR BLD CALC: 33.8 % — LOW (ref 34.5–45)
HGB BLD-MCNC: 11 G/DL — LOW (ref 11.5–15.5)
IMM GRANULOCYTES NFR BLD AUTO: 1.4 % — HIGH (ref 0–0.9)
LYMPHOCYTES # BLD AUTO: 1.52 K/UL — SIGNIFICANT CHANGE UP (ref 1–3.3)
LYMPHOCYTES # BLD AUTO: 24.2 % — SIGNIFICANT CHANGE UP (ref 13–44)
MCHC RBC-ENTMCNC: 32.5 GM/DL — SIGNIFICANT CHANGE UP (ref 32–36)
MCHC RBC-ENTMCNC: 33.4 PG — SIGNIFICANT CHANGE UP (ref 27–34)
MCV RBC AUTO: 102.7 FL — HIGH (ref 80–100)
MONOCYTES # BLD AUTO: 0.63 K/UL — SIGNIFICANT CHANGE UP (ref 0–0.9)
MONOCYTES NFR BLD AUTO: 10 % — SIGNIFICANT CHANGE UP (ref 2–14)
NEUTROPHILS # BLD AUTO: 3.88 K/UL — SIGNIFICANT CHANGE UP (ref 1.8–7.4)
NEUTROPHILS NFR BLD AUTO: 62 % — SIGNIFICANT CHANGE UP (ref 43–77)
PLATELET # BLD AUTO: 162 K/UL — SIGNIFICANT CHANGE UP (ref 150–400)
POTASSIUM SERPL-MCNC: 3.8 MMOL/L — SIGNIFICANT CHANGE UP (ref 3.5–5.3)
POTASSIUM SERPL-SCNC: 3.8 MMOL/L — SIGNIFICANT CHANGE UP (ref 3.5–5.3)
PROT SERPL-MCNC: 6.3 G/DL — LOW (ref 6.6–8.7)
RBC # BLD: 3.29 M/UL — LOW (ref 3.8–5.2)
RBC # FLD: 14 % — SIGNIFICANT CHANGE UP (ref 10.3–14.5)
SODIUM SERPL-SCNC: 141 MMOL/L — SIGNIFICANT CHANGE UP (ref 135–145)
WBC # BLD: 6.27 K/UL — SIGNIFICANT CHANGE UP (ref 3.8–10.5)
WBC # FLD AUTO: 6.27 K/UL — SIGNIFICANT CHANGE UP (ref 3.8–10.5)

## 2023-07-09 PROCEDURE — 99285 EMERGENCY DEPT VISIT HI MDM: CPT | Mod: 25

## 2023-07-09 PROCEDURE — 96375 TX/PRO/DX INJ NEW DRUG ADDON: CPT

## 2023-07-09 PROCEDURE — 82962 GLUCOSE BLOOD TEST: CPT

## 2023-07-09 PROCEDURE — 73060 X-RAY EXAM OF HUMERUS: CPT

## 2023-07-09 PROCEDURE — 96372 THER/PROPH/DIAG INJ SC/IM: CPT | Mod: XU

## 2023-07-09 PROCEDURE — 71045 X-RAY EXAM CHEST 1 VIEW: CPT | Mod: 26

## 2023-07-09 PROCEDURE — 73562 X-RAY EXAM OF KNEE 3: CPT | Mod: 26,50

## 2023-07-09 PROCEDURE — 96365 THER/PROPH/DIAG IV INF INIT: CPT

## 2023-07-09 PROCEDURE — 73060 X-RAY EXAM OF HUMERUS: CPT | Mod: 26,RT

## 2023-07-09 PROCEDURE — 70450 CT HEAD/BRAIN W/O DYE: CPT | Mod: 26,MA

## 2023-07-09 PROCEDURE — 93010 ELECTROCARDIOGRAM REPORT: CPT

## 2023-07-09 PROCEDURE — 36415 COLL VENOUS BLD VENIPUNCTURE: CPT

## 2023-07-09 PROCEDURE — 99285 EMERGENCY DEPT VISIT HI MDM: CPT

## 2023-07-09 PROCEDURE — 72125 CT NECK SPINE W/O DYE: CPT | Mod: 26,MA

## 2023-07-09 PROCEDURE — 85025 COMPLETE CBC W/AUTO DIFF WBC: CPT

## 2023-07-09 PROCEDURE — 72125 CT NECK SPINE W/O DYE: CPT | Mod: MA

## 2023-07-09 PROCEDURE — 73562 X-RAY EXAM OF KNEE 3: CPT

## 2023-07-09 PROCEDURE — 73030 X-RAY EXAM OF SHOULDER: CPT | Mod: 26,RT

## 2023-07-09 PROCEDURE — 72170 X-RAY EXAM OF PELVIS: CPT | Mod: 26

## 2023-07-09 PROCEDURE — 73030 X-RAY EXAM OF SHOULDER: CPT

## 2023-07-09 PROCEDURE — 73080 X-RAY EXAM OF ELBOW: CPT

## 2023-07-09 PROCEDURE — 93005 ELECTROCARDIOGRAM TRACING: CPT

## 2023-07-09 PROCEDURE — 72170 X-RAY EXAM OF PELVIS: CPT

## 2023-07-09 PROCEDURE — 70450 CT HEAD/BRAIN W/O DYE: CPT | Mod: MA

## 2023-07-09 PROCEDURE — 80053 COMPREHEN METABOLIC PANEL: CPT

## 2023-07-09 PROCEDURE — 71045 X-RAY EXAM CHEST 1 VIEW: CPT

## 2023-07-09 RX ORDER — DIPHENHYDRAMINE HCL 50 MG
25 CAPSULE ORAL ONCE
Refills: 0 | Status: COMPLETED | OUTPATIENT
Start: 2023-07-09 | End: 2023-07-09

## 2023-07-09 RX ORDER — MORPHINE SULFATE 50 MG/1
4 CAPSULE, EXTENDED RELEASE ORAL ONCE
Refills: 0 | Status: DISCONTINUED | OUTPATIENT
Start: 2023-07-09 | End: 2023-07-09

## 2023-07-09 RX ORDER — OXYCODONE HYDROCHLORIDE 5 MG/1
5 TABLET ORAL ONCE
Refills: 0 | Status: DISCONTINUED | OUTPATIENT
Start: 2023-07-09 | End: 2023-07-09

## 2023-07-09 RX ORDER — HYDROMORPHONE HYDROCHLORIDE 2 MG/ML
0.5 INJECTION INTRAMUSCULAR; INTRAVENOUS; SUBCUTANEOUS ONCE
Refills: 0 | Status: DISCONTINUED | OUTPATIENT
Start: 2023-07-09 | End: 2023-07-09

## 2023-07-09 RX ORDER — OXYCODONE HYDROCHLORIDE 5 MG/1
1 TABLET ORAL
Qty: 9 | Refills: 0
Start: 2023-07-09 | End: 2023-07-11

## 2023-07-09 RX ORDER — ACETAMINOPHEN 500 MG
1000 TABLET ORAL ONCE
Refills: 0 | Status: COMPLETED | OUTPATIENT
Start: 2023-07-09 | End: 2023-07-09

## 2023-07-09 RX ADMIN — Medication 25 MILLIGRAM(S): at 14:59

## 2023-07-09 RX ADMIN — OXYCODONE HYDROCHLORIDE 5 MILLIGRAM(S): 5 TABLET ORAL at 21:44

## 2023-07-09 RX ADMIN — Medication 1000 MILLIGRAM(S): at 15:23

## 2023-07-09 RX ADMIN — Medication 400 MILLIGRAM(S): at 14:42

## 2023-07-09 RX ADMIN — MORPHINE SULFATE 4 MILLIGRAM(S): 50 CAPSULE, EXTENDED RELEASE ORAL at 15:23

## 2023-07-09 RX ADMIN — MORPHINE SULFATE 4 MILLIGRAM(S): 50 CAPSULE, EXTENDED RELEASE ORAL at 15:00

## 2023-07-09 RX ADMIN — Medication 1000 MILLIGRAM(S): at 15:54

## 2023-07-09 RX ADMIN — HYDROMORPHONE HYDROCHLORIDE 0.5 MILLIGRAM(S): 2 INJECTION INTRAMUSCULAR; INTRAVENOUS; SUBCUTANEOUS at 17:15

## 2023-07-09 NOTE — ED PROVIDER NOTE - CARE PROVIDER_API CALL
Rafat Han  Orthopaedic Surgery  [address] [place] [id number]  Phone: [phone]  Fax: [phone]  Follow Up Time:

## 2023-07-09 NOTE — ED PROVIDER NOTE - PROGRESS NOTE DETAILS
X-rays reviewed and show a displaced proximal humerus fracture.  Orthopedics was consulted and will evaluate the patient.  I discussed the x-ray results with the patient and at that time she started to complain that her chest now hurts.  She states she is feeling sharp pains in the middle of her chest.  Will obtain stat EKG.  Will send labs including troponin.  Will reevaluate Son is at bedside.  He states his mother will have help available at home.  She is ambulatory in the ED with steady gait.  Will discharge with outpatient follow-up with orthopedics.  ED return precautions were discussed Spoke with lab and they did not receive additional blood work for troponin. I advised the PT that repeat blood work should be clyde prior to discharge however she does not want to wait any longer and wants to be d/c. She states she was only having a panic attack and that is why she had pain in her chest several hours ago. She denies any cp since this brief episode. EKG was reviewed by dr. Curtis

## 2023-07-09 NOTE — ED ADULT NURSE REASSESSMENT NOTE - NS ED NURSE REASSESS COMMENT FT1
pt reports no relief from tylenol. pt refused xray until pain is better controlled. PA informed and advised to go to bedside to discuss with pt.

## 2023-07-09 NOTE — ED PROVIDER NOTE - INTERNATIONAL TRAVEL
Impression: Vitreous degeneration, right eye Plan: The exam and OCT today show that the macula is flat and the retina is ATTACHED. The patient will taper their post op gtts, obtain a refraction. No

## 2023-07-09 NOTE — ED PROVIDER NOTE - NS ED ATTENDING STATEMENT MOD
This was a shared visit with the ROSARIO. I reviewed and verified the documentation and independently performed the documented:

## 2023-07-09 NOTE — ED ADULT NURSE NOTE - OBJECTIVE STATEMENT
pt a+ox3, BIBA s/p trip and fall at home. pt unable to move right arm, c/o 10/10 pain to right shoulder/arm. swelling noted, right bicep.

## 2023-07-09 NOTE — ED ADULT TRIAGE NOTE - PATIENT ON (OXYGEN DELIVERY METHOD)
- d/c pca  - toradol 15mg ivp q 6 hours prn  - oob  - oxycodone po prn - continue with PCA hydromorphone  - PCA teaching done  - toradol 15mg ivp q 6 hours prn  - oob room air

## 2023-07-09 NOTE — ED PROVIDER NOTE - OBJECTIVE STATEMENT
67-year-old female with multiple medical problems [facility] emergency department complaining of right shoulder pain, bilateral knee pain, and a mild headache after she fell approximately 1 foot landing on her right shoulder.  She states that she did not hit her head.  There was no LOC.  Patient reports most of the pain she is experiences in the right shoulder area.

## 2023-07-09 NOTE — ED PROVIDER NOTE - PHYSICAL EXAMINATION
RT shoulder: There is no clavicular tenderness, there are no deformities, there is swelling and tenderness to palpation over the proximal humerus and mid humerus.  Skin is intact.  Radial pulses palpable.  Sensation is intact distally.  Pelvis: non tender   Knee: There is mild tenderness to the anterior aspect of the bilateral knees, there is ecchymosis noted to the bilateral knees, there are no deformities, there is full range of motion of both knees, skin is intact.

## 2023-07-09 NOTE — CONSULT NOTE ADULT - SUBJECTIVE AND OBJECTIVE BOX
Asked to Ed to evaluate 68 y/o female with right humerus fracture s/p masked to ED to evaluate 68 y/o female who presented to ER with right shoulder and arm pain s/p mechanical fall. patient states she tripped and fell on her right shoulder X rays obtained in the ER revealed right humerus fracture. At this time reports no other orthopedic complaints or areas of pain.    PAST MEDICAL & SURGICAL HISTORY:  Hypothyroid  Headache  Asthma  Reflux  PUD (peptic ulcer disease)  Muscle spasm of both lower legs  Colitis  Crohns disease  Panic attacks  Exostosis  right middle finger  Sleep apnea  Hypotension  Anemia  PUD (peptic ulcer disease)  GERD (gastroesophageal reflux disease)  Rheumatoid arthritis  Degenerative disc disease, lumbar  DJD (degenerative joint disease)  B/L Knee Replacement  Morbid obesity  S/P Gastric By-Pass surgery  COVID-19  2/2021  Renal cyst  Seasonal allergic rhinitis  Cardiac murmur  Carotid stenosis  Carpal tunnel syndrome  Cellulitis  Diabetes mellitus  Anxiety and depression  Renal calculi  Plantar fasciitis  Obese  Seizure  Lumbar radiculopathy  H/O herpes zoster  Hemorrhoid  H/O cervical fracture  H/O fracture of humerus  Bladder tumor  Dry eyes  Cataract  Abdominal hernia  5 times    History of tonsillectomy  History of carpal tunnel release  Complete rotator cuff tear  right x1 left x2  H/O vein stripping  Gastric bypass status for obesity  15 years ago and revision 1 year later  History of nasal surgery  fractured 2 times repaired  H/O rhinoplasty  1975  Boils  left arm, sweat gland removed  History of back surgery  placement 2 rods and 6 screws 2013 2020  S/P total knee arthroplasty, bilateral  left x1 right x2  History of cholecystectomy    SOCIAL HISTORY:  denies alcohol or ilicit drug abuse    ALLERGIES:  Prozac (Other)  morphine (Hives)      REVIEW OF SYSTEMS:  Musculoskeletal: right proximal arm pain  all other pertinent ROS are negative    Physical exam: NAD, AAO  right proximal arm swelling and tenderness to palpation. skin intact, no ecchymosis  gross motor intact throughout extremity, painful shoulder ROM secondary to humeral fracture. able to elbow move wrist and all digits  gross sensation is intact throughout extremity  pulses appreciated, cap refill < 2 sec    Xray: right mid shaft/proximal humerus fracture    A/P 68 y/o female with right humerus fracture s/p mechanical fall    -coaptation splint with cuff and collar applied   -maintain splint, keep clean and dry  -explained instructions to patient and daughter  -pain control as clinically indicated  -f/u outpatient with Dr Han  -d/w Dr Han

## 2023-07-09 NOTE — ED PROVIDER NOTE - PATIENT PORTAL LINK FT
You can access the FollowMyHealth Patient Portal offered by [facility] by registering at the following website: [email or web address]. By joining [facility]’s FollowMyHealth portal, you will also be able to view your health information using other applications (apps) compatible with our system.

## 2023-07-09 NOTE — ED ADULT TRIAGE NOTE - CHIEF COMPLAINT QUOTE
Pt A&Ox4., NAD. Pt BIBEMS with complaints of right arm pain s/p trip and fall. -LOC. Breathing even and unlabored.

## 2023-07-09 NOTE — ED PROVIDER NOTE - CLINICAL SUMMARY MEDICAL DECISION MAKING FREE TEXT BOX
This is a 67-year-old female who presented to the emergency department status post fall from proximately 1 foot landing on her right shoulder.  CT of the head and cervical spine were obtained which showed no traumatic injury.  X-rays were obtained and are significant for a proximal humerus fracture.  The patient was evaluated by orthopedics and placed in a splint.  Patient will be discharged with outpatient orthopedic follow-up.  ED return precaution discussed with patient and her son who is at bedside

## 2023-07-09 NOTE — ED PROVIDER NOTE - NPI NUMBER (FOR SYSADMIN USE ONLY) :
[Dyspnea on exertion] : dyspnea during exertion [Negative] : Heme/Lymph [Chest Discomfort] : no chest discomfort [Lower Ext Edema] : no extremity edema [Palpitations] : no palpitations [Orthopnea] : no orthopnea [PND] : no PND [[id number]]

## 2023-07-09 NOTE — ED PROVIDER NOTE - NSFOLLOWUPINSTRUCTIONS_ED_ALL_ED_FT

## 2023-07-10 PROCEDURE — 73080 X-RAY EXAM OF ELBOW: CPT | Mod: 26,RT

## 2023-07-11 ENCOUNTER — APPOINTMENT (OUTPATIENT)
Dept: ORTHOPEDIC SURGERY | Facility: CLINIC | Age: 68
End: 2023-07-11
Payer: MEDICARE

## 2023-07-11 VITALS
DIASTOLIC BLOOD PRESSURE: 66 MMHG | SYSTOLIC BLOOD PRESSURE: 101 MMHG | HEIGHT: 66 IN | HEART RATE: 93 BPM | BODY MASS INDEX: 34.55 KG/M2 | WEIGHT: 215 LBS

## 2023-07-11 PROCEDURE — 99213 OFFICE O/P EST LOW 20 MIN: CPT | Mod: 57

## 2023-07-11 PROCEDURE — 24505 CLTX HUMRL SHFT FX W/MNPJ: CPT | Mod: RT

## 2023-07-11 NOTE — REASON FOR VISIT
[Initial Visit] : an initial visit for [Family Member] : family member [FreeTextEntry2] : Right humerus fracture DOI: 07/09/2023

## 2023-07-11 NOTE — CONSULT LETTER
[Dear  ___] : Dear  [unfilled], [Consult Letter:] : I had the pleasure of evaluating your patient, [unfilled]. [( Thank you for referring [unfilled] for consultation for _____ )] : Thank you for referring [unfilled] for consultation for [unfilled] [Please see my note below.] : Please see my note below. [Consult Closing:] : Thank you very much for allowing me to participate in the care of this patient.  If you have any questions, please do not hesitate to contact me. [Sincerely,] : Sincerely, [FreeTextEntry2] : JOHN REYES\par  [FreeTextEntry3] : Rafat Han DO.\par Sports Medicine \par Orthopaedic Surgery\par \par [facility] [place] \par [address]\[place] [address]\par \par Tel [phone]\par Fax [phone]\par \par For same day and next day orthopedic appointments contact:\par [email or web address] |[phone])\par Appointments available nights and weekends!  \par \par [facility] [place]\par Visit us at [facility]/orthopaedic

## 2023-07-11 NOTE — REVIEW OF SYSTEMS
[Joint Pain] : joint pain [Joint Swelling] : joint swelling [Negative] : Heme/Lymph [FreeTextEntry9] : right humerus fx

## 2023-07-11 NOTE — PHYSICAL EXAM
[de-identified] : General:\par Awake, alert, no acute distress, Patient was cooperative and appropriate during the examination.\par \par The patient is obese for height and age.\par \par Walks without an antalgic gait.\par \par Full, painless range of motion of the neck and back.\par \par Exam of the bilateral lower extremities is intact and symmetric with regards to dermatologic, vascular, and neurologic exam. Bilateral lower extremity sensation is grossly intact to light touch in the DP/SP/T/S/S nerve distributions. Intact DF/PF/EHL. BIlateral lower extremities warm and well-perfused with brisk capillary refill.\par \par \par Pulmonary:\par Regular, nonlabored breathing\par \par Abdomen:\par Soft, nontender, nondistended.\par \par Lymphatic:\par No evidence of inguinal lymphadenopathy\par \par Right upper extremity exam:\par Physical exam of the upper extremity demonstrates intact skin without any abrasions.  Splint was removed for examination.  There is moderate swelling and ecchymosis about the humerus extending towards the medial aspect of the elbow.  No erythema, warmth, or signs of infection.\par \par Sensation intact light touch C5-T1\par Palpable radial pulse\par Radial/ulnar/median/axillary/musculocutaneous/AIN/PIN nerves grossly intact\par \par Range of motion:\par Not tested today\par \par Palpation:\par Exquisitely tender to palpation of the humeral shaft\par Not tender to palpation about the scapula or clavicle\par Not tender to palpation about the forearm or wrist\par \par Strength testing:\par Not tested today secondary to fracture\par \par Special test:\par Not tested today secondary to fracture [de-identified] : X-rays including multiple pre and postreduction films of the right humerus from [facility] on 7/9/2023 were reviewed today.  The patient has a spiral fracture of the midshaft of the humerus with fracture extension proximally into the proximal humerus.  Postreduction films demonstrate angulation of the fracture in varus.\par \par X-rays including 2 postreduction views of the right humerus were obtained after Andersen brace application today in the office.  These films demonstrate resolution of the varus angulation in better alignment compared to hospital films.

## 2023-07-11 NOTE — DISCUSSION/SUMMARY
[de-identified] : Assessment: 67-year-old female with a right humeral shaft fracture\par \par Plan: I had a long discussion with the patient today regarding the nature of their diagnosis and treatment plan.  Reviewed the patient's films with her and her son today in the office which demonstrate a right humeral shaft fracture with extension to the proximal humerus now in acceptable alignment in the Andersen brace.  We discussed the risks and benefits of no treatment as well as nonoperative and operative treatments.  At this time the patient's x-rays meet nonsurgical criteria.  She will remain nonweightbearing in her Andersen brace which will remain on at all times.  She is advised to keep the distal aspect of the brace well-padded to prevent any skin irritation.  She will avoid any heavy lifting whatsoever as this could lead to fracture displacement and necessitate surgery.  She may take oxycodone and/or Tylenol as needed for pain.  She may ice the shoulder and arm for any residual swelling.  Recommend follow-up in 1 week for repeat clinical and radiographic assessment.  The patient and her son understand that should any significant fracture displacement occur surgical intervention may be warranted.\par \par The patient verbalizes their understanding and agrees with the plan.  All questions were answered to their satisfaction.

## 2023-07-11 NOTE — HISTORY OF PRESENT ILLNESS
[Worsening] : worsening [Constant] : ~He/She~ states the symptoms seem to be constant [Direct Pressure] : worsened by direct pressure [Lifting] : worsened by lifting [Rest] : relieved by rest [de-identified] : DOI: 07/09/2023\par \par 7/11/2023: Maria A is a pleasant 67-year-old right-hand-dominant female who presents the office today with her son for evaluation of a right humeral shaft fracture.  The patient states that she tripped and fell on 7/9/2023 onto her right side.  She had immediate pain and swelling went to emergency department where she was seen by the orthopedic team.  X-rays were taken and confirmed a humeral shaft fracture.  A closed reduction and splint application was performed and she was told to follow-up as an outpatient.  She complains of severe pain in the arm.  She is taking oxycodone which helps somewhat with the pain.  She has kept her sling and splint clean and dry.  She has never injured this arm before.  She denies any fevers, chills, sweats, redness, warmth, drainage, notes, tingling, or pain elsewhere.

## 2023-07-11 NOTE — PROCEDURE
[de-identified] : Patient's coaptation splint was removed today in the office.  Initial x-rays demonstrated varus alignment of the fracture and a closed reduction maneuver was performed.  She was then converted into a Andersen brace which was fitted for today in the office.  Postreduction x-rays were obtained.  She tolerated this well and was neurovascular intact after the procedure.

## 2023-07-13 ENCOUNTER — APPOINTMENT (OUTPATIENT)
Dept: DERMATOLOGY | Facility: CLINIC | Age: 68
End: 2023-07-13

## 2023-07-21 ENCOUNTER — APPOINTMENT (OUTPATIENT)
Dept: ORTHOPEDIC SURGERY | Facility: CLINIC | Age: 68
End: 2023-07-21
Payer: MEDICARE

## 2023-07-21 ENCOUNTER — APPOINTMENT (OUTPATIENT)
Dept: FAMILY MEDICINE | Facility: CLINIC | Age: 68
End: 2023-07-21
Payer: MEDICARE

## 2023-07-21 VITALS
HEART RATE: 103 BPM | HEIGHT: 66 IN | DIASTOLIC BLOOD PRESSURE: 67 MMHG | SYSTOLIC BLOOD PRESSURE: 105 MMHG | BODY MASS INDEX: 34.55 KG/M2 | WEIGHT: 215 LBS

## 2023-07-21 VITALS
WEIGHT: 215 LBS | OXYGEN SATURATION: 96 % | HEIGHT: 66 IN | BODY MASS INDEX: 34.55 KG/M2 | HEART RATE: 67 BPM | TEMPERATURE: 97.9 F | DIASTOLIC BLOOD PRESSURE: 64 MMHG | SYSTOLIC BLOOD PRESSURE: 108 MMHG

## 2023-07-21 DIAGNOSIS — L89.90 PRESSURE ULCER OF UNSPECIFIED SITE, UNSPECIFIED STAGE: ICD-10-CM

## 2023-07-21 PROCEDURE — 99213 OFFICE O/P EST LOW 20 MIN: CPT

## 2023-07-21 PROCEDURE — 73060 X-RAY EXAM OF HUMERUS: CPT

## 2023-07-21 PROCEDURE — 99024 POSTOP FOLLOW-UP VISIT: CPT

## 2023-07-21 RX ORDER — ZINC OXIDE
40 OINTMENT (GRAM) TOPICAL
Qty: 1 | Refills: 1 | Status: ACTIVE | COMMUNITY
Start: 2023-07-21 | End: 1900-01-01

## 2023-07-21 NOTE — PHYSICAL EXAM
[de-identified] : General:\par Awake, alert, no acute distress, Patient was cooperative and appropriate during the examination.\par \par The patient is obese for height and age.\par \par Walks without an antalgic gait.\par \par Full, painless range of motion of the neck and back.\par \par Exam of the bilateral lower extremities is intact and symmetric with regards to dermatologic, vascular, and neurologic exam. Bilateral lower extremity sensation is grossly intact to light touch in the DP/SP/T/S/S nerve distributions. Intact DF/PF/EHL. BIlateral lower extremities warm and well-perfused with brisk capillary refill.\par \par \par Pulmonary:\par Regular, nonlabored breathing\par \par Abdomen:\par Soft, nontender, nondistended.\par \par Lymphatic:\par No evidence of inguinal lymphadenopathy\par \par Right upper extremity exam:\par Physical exam of the upper extremity demonstrates intact skin without any abrasions.  Splint was removed for examination.  There is mild to moderate swelling and ecchymosis about the humerus extending towards the medial aspect of the elbow.  No erythema, warmth, or signs of infection.  There is some mild skin irritation on the medial aspect of the right upper arm where the braces without any open wounds, erythema, warmth, drainage, evidence of infection.  The area was padded in the office today.  She is well positioned in the brace today.\par \par Sensation intact light touch C5-T1\par Palpable radial pulse\par Radial/ulnar/median/axillary/musculocutaneous/AIN/PIN nerves grossly intact\par \par Range of motion:\par Not tested today\par \par Palpation:\par Exquisitely tender to palpation of the humeral shaft\par Not tender to palpation about the scapula or clavicle\par Not tender to palpation about the forearm or wrist\par \par Strength testing:\par Not tested today secondary to fracture\par \par Special test:\par Not tested today secondary to fracture [de-identified] : X-ray 2 views of the right shoulder taken in the office today on 7/21/2023 shows a comminuted displaced spiral fracture of the midshaft of the humerus in similar position compared to previous x-rays.\par \par X-rays including multiple pre and postreduction films of the right humerus from [facility] on 7/9/2023 were reviewed today.  The patient has a spiral fracture of the midshaft of the humerus with fracture extension proximally into the proximal humerus.  Postreduction films demonstrate angulation of the fracture in varus.\par \par X-rays including 2 postreduction views of the right humerus were obtained after Andersen brace application today in the office.  These films demonstrate resolution of the varus angulation in better alignment compared to hospital films.

## 2023-07-21 NOTE — REVIEW OF SYSTEMS
[Joint Pain] : joint pain [Joint Stiffness] : joint stiffness [Joint Swelling] : joint swelling [Negative] : Heme/Lymph [FreeTextEntry9] : right humerus fx

## 2023-07-21 NOTE — DISCUSSION/SUMMARY
[de-identified] : Assessment: 67-year-old female with a right humeral shaft fracture\par \par Plan: I had a long discussion with the patient today regarding the nature of their diagnosis and treatment plan.  Reviewed the patient's films with her and her son today in the office which demonstrate a right humeral shaft fracture with extension to the proximal humerus now in acceptable alignment in the Andersen brace.  We discussed the risks and benefits of no treatment as well as nonoperative and operative treatments.  At this time the patient's x-rays meet nonsurgical criteria.  She will remain nonweightbearing in her Andersen brace which will remain on at all times.  She is advised to keep the distal aspect of the brace well-padded to prevent any skin irritation and keep an eye on the area that was pending in the office today for any evidence of infection.  She will call if her clinical picture changes.  I emphasized the importance of this.  She will avoid any heavy lifting whatsoever as this could lead to fracture displacement and necessitate surgery.  I advised her to contact her pain management doctor for a different prescription as the pharmacy was unable to feel any further medications from us because of her recent prescription refill of her oxycodone.  I advised her to contact her pain management doctor today for further pain medication to better manage her pain as she is not getting sufficient relief from the oxycodone.  I emphasized the importance of this.  The patient and her son understand that should any significant fracture displacement occur surgical intervention may be warranted.  She will follow-up in 1 week for repeat evaluation and new x-rays of the humerus.  Patient discussed and reviewed with Dr. Han today.\par \par The patient verbalizes their understanding and agrees with the plan.  All questions were answered to their satisfaction.

## 2023-07-21 NOTE — HISTORY OF PRESENT ILLNESS
[Worsening] : worsening [Constant] : ~He/She~ states the symptoms seem to be constant [Direct Pressure] : worsened by direct pressure [Lifting] : worsened by lifting [Rest] : relieved by rest [de-identified] : DOI: 07/09/2023\par \par 07/21/2023 : ENRIKE TERRY  is a 67 year  old female who presents to the office for follow-up evaluation of her right shoulder humeral shaft fracture.  She states she has been compliant with her restriction in the brace and states she had a little bit of bleeding and irritation underneath the brace the other day but cleaned it up and has had no issues since.  She states she still has a lot of pain in the shoulder and has been taking oxycodone but this is not giving her good relief.  She has a pain management doctor who recently just sent her 60 oxycodone which she has been taking but she is not getting sufficient relief from this.  She has been compliant with her restrictions in the brace.  She is here for routine follow-up to reassess.  She denies any fevers, chills, chest pain, shortness of breath.  She denies any numbness or tingling distally.\par \par 7/11/2023: Enrike is a pleasant 67-year-old right-hand-dominant female who presents the office today with her son for evaluation of a right humeral shaft fracture.  The patient states that she tripped and fell on 7/9/2023 onto her right side.  She had immediate pain and swelling went to emergency department where she was seen by the orthopedic team.  X-rays were taken and confirmed a humeral shaft fracture.  A closed reduction and splint application was performed and she was told to follow-up as an outpatient.  She complains of severe pain in the arm.  She is taking oxycodone which helps somewhat with the pain.  She has kept her sling and splint clean and dry.  She has never injured this arm before.  She denies any fevers, chills, sweats, redness, warmth, drainage, notes, tingling, or pain elsewhere.

## 2023-07-21 NOTE — CONSULT LETTER
[Dear  ___] : Dear  [unfilled], [Consult Letter:] : I had the pleasure of evaluating your patient, [unfilled]. [( Thank you for referring [unfilled] for consultation for _____ )] : Thank you for referring [unfilled] for consultation for [unfilled] [Please see my note below.] : Please see my note below. [Consult Closing:] : Thank you very much for allowing me to participate in the care of this patient.  If you have any questions, please do not hesitate to contact me. [Sincerely,] : Sincerely, [FreeTextEntry2] : JOHN REYES\par  [FreeTextEntry3] : Rafat Han DO.\par Sports Medicine \par Orthopaedic Surgery\par \par [facility] [place] \par [address]\[place] [address]\par \par Tel [phone]\par Fax [phone]\par \par For same day and next day orthopedic appointments contact:\par [email or web address] |[phone])\par Appointments available nights and weekends!  \par \par [facility] [place]\par Visit us at [facility]/orthopaedic

## 2023-07-23 NOTE — PHYSICAL EXAM
[Normal] : normal rate, regular rhythm, normal S1 and S2 and no murmur heard [de-identified] : stage 2 skin ulcers at b/l intergluteal clefts

## 2023-07-23 NOTE — HISTORY OF PRESENT ILLNESS
[FreeTextEntry8] : Pt c/o bedsores for approx 2 weeks. Pt had bedsores in the past when she was hospitalized which then healed. Pt tripped on 7/9/23 and broke her R humerus, pt followin up with ortho Dr. Han. Since then she has mostly remained sitting on couch or laying in bed which led to development of bedsores.\par

## 2023-07-23 NOTE — ASSESSMENT
[FreeTextEntry1] : pressure ulcers - advised pt on ambulation, offloading of pressure from sites of ulcers. topical zinc oxide

## 2023-07-23 NOTE — HEALTH RISK ASSESSMENT
[Former] : Former [20 or more] : 20 or more [> 15 Years] : > 15 Years Otezla Counseling: The side effects of Otezla were discussed with the patient, including but not limited to worsening or new depression, weight loss, diarrhea, nausea, upper respiratory tract infection, and headache. Patient instructed to call the office should any adverse effect occur.  The patient verbalized understanding of the proper use and possible adverse effects of Otezla.  All the patient's questions and concerns were addressed.

## 2023-07-31 ENCOUNTER — APPOINTMENT (OUTPATIENT)
Dept: ORTHOPEDIC SURGERY | Facility: CLINIC | Age: 68
End: 2023-07-31
Payer: MEDICARE

## 2023-07-31 VITALS
WEIGHT: 215 LBS | HEIGHT: 64 IN | HEART RATE: 98 BPM | SYSTOLIC BLOOD PRESSURE: 106 MMHG | BODY MASS INDEX: 36.7 KG/M2 | DIASTOLIC BLOOD PRESSURE: 69 MMHG

## 2023-07-31 PROCEDURE — 99024 POSTOP FOLLOW-UP VISIT: CPT

## 2023-08-01 ENCOUNTER — RX RENEWAL (OUTPATIENT)
Age: 68
End: 2023-08-01

## 2023-08-03 ENCOUNTER — LABORATORY RESULT (OUTPATIENT)
Age: 68
End: 2023-08-03

## 2023-08-03 ENCOUNTER — APPOINTMENT (OUTPATIENT)
Dept: FAMILY MEDICINE | Facility: CLINIC | Age: 68
End: 2023-08-03
Payer: MEDICARE

## 2023-08-03 VITALS
BODY MASS INDEX: 36.19 KG/M2 | DIASTOLIC BLOOD PRESSURE: 80 MMHG | OXYGEN SATURATION: 97 % | HEIGHT: 64 IN | HEART RATE: 89 BPM | TEMPERATURE: 98 F | WEIGHT: 212 LBS | SYSTOLIC BLOOD PRESSURE: 122 MMHG

## 2023-08-03 DIAGNOSIS — Z00.00 ENCOUNTER FOR GENERAL ADULT MEDICAL EXAMINATION W/OUT ABNORMAL FINDINGS: ICD-10-CM

## 2023-08-03 DIAGNOSIS — K21.9 GASTRO-ESOPHAGEAL REFLUX DISEASE W/OUT ESOPHAGITIS: ICD-10-CM

## 2023-08-03 PROCEDURE — G0439: CPT

## 2023-08-03 RX ORDER — CYCLOBENZAPRINE HYDROCHLORIDE 10 MG/1
10 TABLET, FILM COATED ORAL
Qty: 1 | Refills: 0 | Status: COMPLETED | COMMUNITY
Start: 2021-03-13 | End: 2023-08-03

## 2023-08-03 RX ORDER — TRAMADOL HYDROCHLORIDE 50 MG/1
50 TABLET, COATED ORAL
Qty: 30 | Refills: 0 | Status: COMPLETED | COMMUNITY
Start: 2020-06-22 | End: 2023-08-03

## 2023-08-03 RX ORDER — SULFAMETHOXAZOLE AND TRIMETHOPRIM 400; 80 MG/1; MG/1
400-80 TABLET ORAL DAILY
Qty: 60 | Refills: 0 | Status: COMPLETED | COMMUNITY
Start: 2022-01-25 | End: 2023-08-03

## 2023-08-03 RX ORDER — AMOXICILLIN AND CLAVULANATE POTASSIUM 875; 125 MG/1; MG/1
875-125 TABLET, COATED ORAL
Qty: 14 | Refills: 0 | Status: COMPLETED | COMMUNITY
Start: 2022-06-08 | End: 2023-08-03

## 2023-08-03 RX ORDER — ALBUTEROL SULFATE 1.25 MG/3ML
1.25 SOLUTION RESPIRATORY (INHALATION)
Qty: 1 | Refills: 2 | Status: COMPLETED | COMMUNITY
Start: 2021-02-19 | End: 2023-08-03

## 2023-08-04 RX ORDER — FLASH GLUCOSE SENSOR
KIT MISCELLANEOUS
Qty: 1 | Refills: 0 | Status: ACTIVE | COMMUNITY
Start: 2023-08-04 | End: 1900-01-01

## 2023-08-06 PROBLEM — K21.9 CHRONIC GERD: Status: ACTIVE | Noted: 2018-01-12

## 2023-08-06 RX ORDER — NEOMYCIN SULFATE, POLYMYXIN B SULFATE, HYDROCORTISONE 3.5; 10000; 1 MG/ML; [USP'U]/ML; MG/ML
1 SOLUTION/ DROPS AURICULAR (OTIC) 4 TIMES DAILY
Qty: 1 | Refills: 0 | Status: COMPLETED | COMMUNITY
Start: 2023-04-04 | End: 2023-08-06

## 2023-08-06 RX ORDER — TRIAMCINOLONE ACETONIDE 1 MG/G
0.1 OINTMENT TOPICAL
Qty: 1 | Refills: 1 | Status: COMPLETED | COMMUNITY
Start: 2022-02-24 | End: 2023-08-06

## 2023-08-06 RX ORDER — AZELASTINE HYDROCHLORIDE 205.5 UG/1
0.15 SPRAY, METERED NASAL
Qty: 1 | Refills: 0 | Status: COMPLETED | COMMUNITY
Start: 2018-02-26 | End: 2023-08-06

## 2023-08-06 RX ORDER — BENZONATATE 100 MG/1
100 CAPSULE ORAL
Qty: 30 | Refills: 0 | Status: COMPLETED | COMMUNITY
Start: 2021-12-28 | End: 2023-08-06

## 2023-08-06 NOTE — PHYSICAL EXAM
[No Acute Distress] : no acute distress [Supple] : supple [Normal] : normal rate, regular rhythm, normal S1 and S2 and no murmur heard [Soft] : abdomen soft [Non Tender] : non-tender [Non-distended] : non-distended [No CVA Tenderness] : no CVA  tenderness [No Rash] : no rash [Normal Gait] : normal gait [de-identified] : R upper arm in cast. s/p amputated L 2nd 4th 5th toes and R toes 2-5 [de-identified] : tearful, depressed mood

## 2023-08-06 NOTE — ASSESSMENT
[FreeTextEntry1] : humerus fx - f/u ortho Pt needs help w/ ADLs, would benefit from a HHA anxiety, depression - refill duloxetine 60mg q day, amitriptyline 25mg qhs. refer to behavioral health manager asthma - cont meds, refill montelukast GERD- advised dietary modifications to decrease caffeine intake, chocolate, spicy or acidic foods/drinks. refill dexilatn ptn crohn's -  refill mesalamine DM- cont januvia, metformin, lantus 40 units qhs, humalog anemia - recheck cbc hypothyroidism - refill levothyroxine, check TFTs hx PE - cont eliquis seizure d/o - f/u neuro cont oxcarbazepine Healthy diet and regular exercise regimen discussed w/ pt. Screening labs ordered flu vaccine during flu season recommended Advised routine pap smear and mammogram, dexa Any questions call office

## 2023-08-06 NOTE — HISTORY OF PRESENT ILLNESS
[de-identified] : Pt in office for AWV Pt tripped on 7/9/23 and broke her R humerus, pt following up with ortho Dr. Han. Will need to be in cast for approx 8 weeks  Pt had PE in early 2023, now currently on eliquis.  Pt for f/u DM. Glucose 210-220 fasting, 200-230 in PM persistently. Pt eating too many fruits in her diet. Pt on lantus 40 units qhs, humalog 15 units bid before breakfast and dinner. Pt on metformin 1000mg bid, Januvia 100mg. 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  Pt has chronic fe defic anemia, sees hematologist Dr. Eduardo.   Pt hx hx of recurrent renal stones b/l, and recurrent UTIs pt sees urologist. Denies abd pain, flank pain, fever, chills  Pt has hx of PAF. Sees cardio Dr. Callahan.  Pt has asthma has been stable, controlled on singulair and albuterol prn. Pt has hx Crohn's controlled on mesalamine. Pt has hx seizure d/o, controlled on oxcarbazepine, no recent seizures  Exsmoker 1 ppd x 23yrs, quit approx 2000 ETOH use denies Drug use denies Exercises never Diet poor high carb/portions

## 2023-08-06 NOTE — HEALTH RISK ASSESSMENT
[No] : No [Any fall with injury in past year] : Patient reported fall with injury in the past year [3] : 2) Feeling down, depressed, or hopeless for nearly every day (3) [PHQ-2 Positive] : PHQ-2 Positive [Nearly Every Day (3)] : 6.) Feeling bad about yourself, or that you are a failure, or have let yourself or your family down? Nearly every day [1/2 of Days or More (2)] : 7.) Trouble concentrating on things, such as reading a newspaper or watching television? Half the days or more [Not at All (0)] : 8.) Moving or speaking so slowly that other people could have noticed, or the opposite, moving or speaking faster than usual? Not at all [Moderately Severe] : severity of depression is moderately severe [Extremely Difficult] : How difficult have these problems made it for you to do your work, take care of things at home, or get along with people? Extremely difficult [Patient reported mammogram was normal] : Patient reported mammogram was normal [Patient reported bone density results were abnormal] : Patient reported bone density results were abnormal [Patient reported colonoscopy was normal] : Patient reported colonoscopy was normal [HIV test declined] : HIV test declined [Hepatitis C test declined] : Hepatitis C test declined [With Family] : lives with family [Reports changes in hearing] : Reports changes in hearing [Smoke Detector] : smoke detector [Carbon Monoxide Detector] : carbon monoxide detector [Safety elements used in home] : safety elements used in home [Seat Belt] :  uses seat belt [Sunscreen] : uses sunscreen [With Patient/Caregiver] : , with patient/caregiver [Reviewed updated] : Reviewed, updated [Designated Healthcare Proxy] : Designated healthcare proxy [Name: ___] : Health Care Proxy's Name: [unfilled]  [Relationship: ___] : Relationship: [unfilled] [Aggressive treatment] : aggressive treatment [Former] : Former [20 or more] : 20 or more [> 15 Years] : > 15 Years [Poor] : ~his/her~ mood as  poor [Very Difficult] : How difficult have these problems made it for you to do your work, take care of things at home, or get along with people? Very difficult [PHQ-9 Positive] : PHQ-9 Positive [I have developed a follow-up plan documented below in the note.] : I have developed a follow-up plan documented below in the note. [Patient reported PAP Smear was normal] : Patient reported PAP Smear was normal [OWG2Hunob] : 6 [XFN5CpfktZopdz] : 17 [Change in mental status noted] : No change in mental status noted [Feels Safe at Home] : Feels safe at home [Independent] : managing medications [Some assistance needed] : managing finances [Full assistance needed] : housekeeping [Reports changes in vision] : Reports no changes in vision [Reports changes in dental health] : Reports no changes in dental health [MammogramDate] : 11/20 [PapSmearDate] : 11/20 [BoneDensityDate] : 10/22 [BoneDensityComments] : osteoporosis wants prolia [ColonoscopyDate] : 01/23 [de-identified] : lives w/ mother [de-identified] : uses hearing aids [AdvancecareDate] : 08/23

## 2023-08-07 ENCOUNTER — APPOINTMENT (OUTPATIENT)
Dept: COLORECTAL SURGERY | Facility: CLINIC | Age: 68
End: 2023-08-07
Payer: MEDICARE

## 2023-08-07 ENCOUNTER — APPOINTMENT (OUTPATIENT)
Dept: ORTHOPEDIC SURGERY | Facility: CLINIC | Age: 68
End: 2023-08-07
Payer: MEDICARE

## 2023-08-07 VITALS
HEART RATE: 73 BPM | WEIGHT: 213 LBS | SYSTOLIC BLOOD PRESSURE: 101 MMHG | DIASTOLIC BLOOD PRESSURE: 66 MMHG | HEIGHT: 64 IN | BODY MASS INDEX: 36.37 KG/M2

## 2023-08-07 VITALS
BODY MASS INDEX: 36.37 KG/M2 | TEMPERATURE: 98 F | DIASTOLIC BLOOD PRESSURE: 62 MMHG | HEART RATE: 79 BPM | HEIGHT: 64 IN | SYSTOLIC BLOOD PRESSURE: 98 MMHG | OXYGEN SATURATION: 96 % | RESPIRATION RATE: 14 BRPM | WEIGHT: 213 LBS

## 2023-08-07 LAB
25(OH)D3 SERPL-MCNC: 27.8 NG/ML
ALBUMIN SERPL ELPH-MCNC: 4.2 G/DL
ALP BLD-CCNC: 301 U/L
ALT SERPL-CCNC: 27 U/L
ANION GAP SERPL CALC-SCNC: 13 MMOL/L
APPEARANCE: CLEAR
AST SERPL-CCNC: 16 U/L
BACTERIA: NEGATIVE /HPF
BILIRUB SERPL-MCNC: 0.3 MG/DL
BILIRUBIN URINE: NEGATIVE
BLOOD URINE: NEGATIVE
BUN SERPL-MCNC: 13 MG/DL
CALCIUM OXALATE CRYSTALS: PRESENT
CALCIUM SERPL-MCNC: 9.8 MG/DL
CAST: 2 /LPF
CHLORIDE SERPL-SCNC: 97 MMOL/L
CHOLEST SERPL-MCNC: 119 MG/DL
CO2 SERPL-SCNC: 29 MMOL/L
COLOR: YELLOW
CREAT SERPL-MCNC: 0.63 MG/DL
EGFR: 98 ML/MIN/1.73M2
EPITHELIAL CELLS: 3 /HPF
ESTIMATED AVERAGE GLUCOSE: 151 MG/DL
GLUCOSE QUALITATIVE U: NEGATIVE MG/DL
GLUCOSE SERPL-MCNC: 215 MG/DL
HBA1C MFR BLD HPLC: 6.9 %
HDLC SERPL-MCNC: 43 MG/DL
KETONES URINE: ABNORMAL MG/DL
LDLC SERPL CALC-MCNC: 53 MG/DL
LEUKOCYTE ESTERASE URINE: ABNORMAL
MICROSCOPIC-UA: NORMAL
NITRITE URINE: NEGATIVE
NONHDLC SERPL-MCNC: 76 MG/DL
PH URINE: 7.5
POTASSIUM SERPL-SCNC: 4.5 MMOL/L
PROT SERPL-MCNC: 6.6 G/DL
PROTEIN URINE: 30 MG/DL
RED BLOOD CELLS URINE: 3 /HPF
REVIEW: NORMAL
SODIUM SERPL-SCNC: 140 MMOL/L
SPECIFIC GRAVITY URINE: 1.02
T4 FREE SERPL-MCNC: 1.1 NG/DL
TRIGL SERPL-MCNC: 127 MG/DL
TSH SERPL-ACNC: 3.75 UIU/ML
UROBILINOGEN URINE: 1 MG/DL
WHITE BLOOD CELLS URINE: 149 /HPF

## 2023-08-07 PROCEDURE — 73060 X-RAY EXAM OF HUMERUS: CPT

## 2023-08-07 PROCEDURE — 99214 OFFICE O/P EST MOD 30 MIN: CPT

## 2023-08-07 PROCEDURE — 99024 POSTOP FOLLOW-UP VISIT: CPT

## 2023-08-07 NOTE — CONSULT LETTER
[Dear  ___] : Dear  [unfilled], [Consult Letter:] : I had the pleasure of evaluating your patient, [unfilled]. [( Thank you for referring [unfilled] for consultation for _____ )] : Thank you for referring [unfilled] for consultation for [unfilled] [Please see my note below.] : Please see my note below. [Consult Closing:] : Thank you very much for allowing me to participate in the care of this patient.  If you have any questions, please do not hesitate to contact me. [Sincerely,] : Sincerely, [FreeTextEntry2] : JOHN REYES\par   [FreeTextEntry3] : Rafat Han DO.\par  Sports Medicine \par  Orthopaedic Surgery\par  \par  [facility] [place] \par  [address]\[place] [address]\par  \par  Tel [phone]\par  Fax [phone]\par  \par  For same day and next day orthopedic appointments contact:\par  [email or web address] |[phone]  Appointments available nights and weekends!  \par  \par  [facility] [place]\par  Visit us at [facility]/orthopaedic

## 2023-08-07 NOTE — DISCUSSION/SUMMARY
[de-identified] : Assessment: 67-year-old female with a right humeral shaft fracture  Plan: I had a long discussion with the patient today regarding the nature of their diagnosis and treatment plan.  Reviewed the patient's films with her and her son today in the office which demonstrate a right humeral shaft fracture with extension to the proximal humerus now in acceptable alignment in the Andersen brace.  We discussed the risks and benefits of no treatment as well as nonoperative and operative treatments.  At this time the patient's x-rays meet nonsurgical criteria.  She will remain nonweightbearing in her Andersen brace which will remain on at all times.  I emphasized the importance of her remaining strictly nonweightbearing in the Andersen brace at all times.  I contacted my bracing coordinator who will follow-up with her tomorrow to have the brace custom fitted and padded to her to prevent irritation and I advised her that she is to remain in this brace at all times and not removed for any reason.  She will follow-up in 1 week for repeat evaluation to reassess with new x-rays of the humerus.  I did advise her that if she was to continue to be noncompliant with the brace and the fracture were not to heal or to further displace that she would likely need surgical intervention.  She will continue with Tylenol and oxycodone as needed for pain.    The patient verbalizes their understanding and agrees with the plan.  All questions were answered to their satisfaction.

## 2023-08-07 NOTE — PHYSICAL EXAM
[de-identified] : General: Awake, alert, no acute distress, Patient was cooperative and appropriate during the examination.  The patient is obese for height and age.  Walks without an antalgic gait.  Full, painless range of motion of the neck and back.  Exam of the bilateral lower extremities is intact and symmetric with regards to dermatologic, vascular, and neurologic exam. Bilateral lower extremity sensation is grossly intact to light touch in the DP/SP/T/S/S nerve distributions. Intact DF/PF/EHL. BIlateral lower extremities warm and well-perfused with brisk capillary refill.   Pulmonary: Regular, nonlabored breathing  Abdomen: Soft, nontender, nondistended.  Lymphatic: No evidence of inguinal lymphadenopathy  Right upper extremity exam: Physical exam of the upper extremity demonstrates intact skin without any abrasions.  Splint was removed for examination.  There is mild swelling and ecchymosis about the humerus extending towards the medial aspect of the elbow improving from the last office visit.  There is no skin breakdown or skin irritation near where the Andersen brace was previously positioned.  Sensation intact light touch C5-T1 Palpable radial pulse Radial/ulnar/median/axillary/musculocutaneous/AIN/PIN nerves grossly intact  Range of motion: Not tested today  Palpation: Exquisitely tender to palpation of the humeral shaft Not tender to palpation about the scapula or clavicle Not tender to palpation about the forearm or wrist  Strength testing: Not tested today secondary to fracture  Special test: Not tested today secondary to fracture [de-identified] : X-ray 2 views of the right shoulder taken in the office today on 7/31/2023 reveals a comminuted displaced spiral fracture of the midshaft of the humerus in similar position compared to previous x-rays.  X-ray 2 views of the right shoulder taken in the office today on 7/21/2023 shows a comminuted displaced spiral fracture of the midshaft of the humerus in similar position compared to previous x-rays.  X-rays including multiple pre and postreduction films of the right humerus from [facility] on 7/9/2023 were reviewed today.  The patient has a spiral fracture of the midshaft of the humerus with fracture extension proximally into the proximal humerus.  Postreduction films demonstrate angulation of the fracture in varus.  X-rays including 2 postreduction views of the right humerus were obtained after Andersen brace application today in the office.  These films demonstrate resolution of the varus angulation in better alignment compared to hospital films.

## 2023-08-07 NOTE — CONSULT LETTER
[Dear  ___] : Dear  [unfilled], [Consult Letter:] : I had the pleasure of evaluating your patient, [unfilled]. [( Thank you for referring [unfilled] for consultation for _____ )] : Thank you for referring [unfilled] for consultation for [unfilled] [Please see my note below.] : Please see my note below. [Consult Closing:] : Thank you very much for allowing me to participate in the care of this patient.  If you have any questions, please do not hesitate to contact me. [Sincerely,] : Sincerely, [FreeTextEntry2] : JOHN REYES\par   [FreeTextEntry3] : Rafat Han DO.\par  Sports Medicine \par  Orthopaedic Surgery\par  \par  [facility] [place] \par  [address]\[place] [address]\par  \par  Tel [phone]\par  Fax [phone]\par  \par  For same day and next day orthopedic appointments contact:\par  [email or web address] |[phone]  Appointments available nights and weekends!  \par  \par  [facility] [place]\par  Visit us at [facility]/orthopaedic

## 2023-08-07 NOTE — HISTORY OF PRESENT ILLNESS
[Worsening] : worsening [Constant] : ~He/She~ states the symptoms seem to be constant [Direct Pressure] : worsened by direct pressure [Lifting] : worsened by lifting [Rest] : relieved by rest [de-identified] : DOI: 07/09/2023 07/31/2023 : ENRIKE TERRY  is a 67 year  old female who presents to the office for follow-up evaluation of her right humeral shaft fracture.  She states that for the last 3 days she has not been wearing the Andersen brace as per our medical recommendation.  She states that she discontinued because it was irritating her skin against our medical advice.  She states over the last 3 days since she discontinued the brace the pain is getting worse.  She states she has been wearing a sling.  She is taking oxycodone and Tylenol for pain.  She presents with her sister for repeat evaluation to reassess.  She denies any numbness or tingling distally.  She has no other complaints today.  07/21/2023 : ENRIKE TERRY  is a 67 year  old female who presents to the office for follow-up evaluation of her right shoulder humeral shaft fracture.  She states she has been compliant with her restriction in the brace and states she had a little bit of bleeding and irritation underneath the brace the other day but cleaned it up and has had no issues since.  She states she still has a lot of pain in the shoulder and has been taking oxycodone but this is not giving her good relief.  She has a pain management doctor who recently just sent her 60 oxycodone which she has been taking but she is not getting sufficient relief from this.  She has been compliant with her restrictions in the brace.  She is here for routine follow-up to reassess.  She denies any fevers, chills, chest pain, shortness of breath.  She denies any numbness or tingling distally.  7/11/2023: Enrike is a pleasant 67-year-old right-hand-dominant female who presents the office today with her son for evaluation of a right humeral shaft fracture.  The patient states that she tripped and fell on 7/9/2023 onto her right side.  She had immediate pain and swelling went to emergency department where she was seen by the orthopedic team.  X-rays were taken and confirmed a humeral shaft fracture.  A closed reduction and splint application was performed and she was told to follow-up as an outpatient.  She complains of severe pain in the arm.  She is taking oxycodone which helps somewhat with the pain.  She has kept her sling and splint clean and dry.  She has never injured this arm before.  She denies any fevers, chills, sweats, redness, warmth, drainage, notes, tingling, or pain elsewhere.

## 2023-08-07 NOTE — REASON FOR VISIT
[Initial Visit] : an initial visit for [Family Member] : family member [Follow-Up Visit] : a follow-up visit for [Shoulder Pain] : shoulder pain [FreeTextEntry2] : Right humerus fracture DOI: 07/09/2023

## 2023-08-07 NOTE — HISTORY OF PRESENT ILLNESS
[de-identified] : DOI: 07/09/2023 08/07/2023: ENRIKE TERRY is a 67 year old female who presents for follow-up regarding her right humeral shaft fracture.  The patient states her pain is improving since her last appointment.  She has been compliant with the Andersen since her last appointment.  She returns today for routine clinical and radiographic follow-up.  She denies any new injury.  She denies any fevers, chills, sweats, or pain elsewhere.  07/31/2023 : ENRIKE TERRY  is a 67 year  old female who presents to the office for follow-up evaluation of her right humeral shaft fracture.  She states that for the last 3 days she has not been wearing the Andersen brace as per our medical recommendation.  She states that she discontinued because it was irritating her skin against our medical advice.  She states over the last 3 days since she discontinued the brace the pain is getting worse.  She states she has been wearing a sling.  She is taking oxycodone and Tylenol for pain.  She presents with her sister for repeat evaluation to reassess.  She denies any numbness or tingling distally.  She has no other complaints today.  07/21/2023 : ENRIKE TERRY  is a 67 year  old female who presents to the office for follow-up evaluation of her right shoulder humeral shaft fracture.  She states she has been compliant with her restriction in the brace and states she had a little bit of bleeding and irritation underneath the brace the other day but cleaned it up and has had no issues since.  She states she still has a lot of pain in the shoulder and has been taking oxycodone but this is not giving her good relief.  She has a pain management doctor who recently just sent her 60 oxycodone which she has been taking but she is not getting sufficient relief from this.  She has been compliant with her restrictions in the brace.  She is here for routine follow-up to reassess.  She denies any fevers, chills, chest pain, shortness of breath.  She denies any numbness or tingling distally.  7/11/2023: Enrike is a pleasant 67-year-old right-hand-dominant female who presents the office today with her son for evaluation of a right humeral shaft fracture.  The patient states that she tripped and fell on 7/9/2023 onto her right side.  She had immediate pain and swelling went to emergency department where she was seen by the orthopedic team.  X-rays were taken and confirmed a humeral shaft fracture.  A closed reduction and splint application was performed and she was told to follow-up as an outpatient.  She complains of severe pain in the arm.  She is taking oxycodone which helps somewhat with the pain.  She has kept her sling and splint clean and dry.  She has never injured this arm before.  She denies any fevers, chills, sweats, redness, warmth, drainage, notes, tingling, or pain elsewhere. [Worsening] : worsening [Constant] : ~He/She~ states the symptoms seem to be constant [Direct Pressure] : worsened by direct pressure [Lifting] : worsened by lifting [Rest] : relieved by rest

## 2023-08-07 NOTE — DISCUSSION/SUMMARY
[de-identified] : Assessment: 67-year-old female with a right humeral shaft fracture  Plan: I had a long discussion with the patient today regarding the nature of their diagnosis and treatment plan.  I reviewed the patient's x-rays today with her in the office which demonstrate that her right humeral shaft fracture remains in acceptable unchanged alignment with early callus formation visible.  She will continue to remain nonweightbearing of right upper extremity in her Andersen for 2 additional weeks.  She will avoid any active shoulder motion.  She may progress elbow, hand, and wrist range of motion.  She may begin pendulum exercises for her shoulder.  She will avoid any heavy lifting whatsoever.  She may continue with Tylenol as needed for pain.  She may continue to ice the shoulder.  Recommend follow-up in 2 weeks for repeat clinical and radiographic assessment.  If there is further healing and improvement in her symptoms at that point we may discontinue the use of her Andersen brace.  The patient verbalizes their understanding and agrees with the plan.  All questions were answered to their satisfaction.

## 2023-08-07 NOTE — CONSULT LETTER
[Dear  ___] : Dear  [unfilled], [Consult Letter:] : I had the pleasure of evaluating your patient, [unfilled]. [( Thank you for referring [unfilled] for consultation for _____ )] : Thank you for referring [unfilled] for consultation for [unfilled] [Please see my note below.] : Please see my note below. [Consult Closing:] : Thank you very much for allowing me to participate in the care of this patient.  If you have any questions, please do not hesitate to contact me. [Sincerely,] : Sincerely, [FreeTextEntry2] : JOHN REYES\par   [FreeTextEntry3] : Rafat Han DO.\par  Sports Medicine \par  Orthopaedic Surgery\par  \par  [facility] \par  [address]\[place] [address]\par  \par  Tel [phone]\par  Fax [phone]\par  \par  For same day and next day orthopedic appointments contact:\par  [email or web address] |[phone]  Appointments available nights and weekends!  \par  \par  [facility]\par  Visit us at [facility]/orthopaedic

## 2023-08-07 NOTE — DISCUSSION/SUMMARY
[de-identified] : Assessment: 67-year-old female with a right humeral shaft fracture  Plan: I had a long discussion with the patient today regarding the nature of their diagnosis and treatment plan.  Reviewed the patient's films with her and her son today in the office which demonstrate a right humeral shaft fracture with extension to the proximal humerus now in acceptable alignment in the Andersen brace.  We discussed the risks and benefits of no treatment as well as nonoperative and operative treatments.  At this time the patient's x-rays meet nonsurgical criteria.  She will remain nonweightbearing in her Andersen brace which will remain on at all times.  I emphasized the importance of her remaining strictly nonweightbearing in the Andersen brace at all times.  I contacted my bracing coordinator who will follow-up with her tomorrow to have the brace custom fitted and padded to her to prevent irritation and I advised her that she is to remain in this brace at all times and not removed for any reason.  She will follow-up in 1 week for repeat evaluation to reassess with new x-rays of the humerus.  I did advise her that if she was to continue to be noncompliant with the brace and the fracture were not to heal or to further displace that she would likely need surgical intervention.  She will continue with Tylenol and oxycodone as needed for pain.    The patient verbalizes their understanding and agrees with the plan.  All questions were answered to their satisfaction.

## 2023-08-07 NOTE — HISTORY OF PRESENT ILLNESS
[Worsening] : worsening [Constant] : ~He/She~ states the symptoms seem to be constant [Direct Pressure] : worsened by direct pressure [Lifting] : worsened by lifting [Rest] : relieved by rest [de-identified] : DOI: 07/09/2023 07/31/2023 : ENRIKE TERRY  is a 67 year  old female who presents to the office for follow-up evaluation of her right humeral shaft fracture.  She states that for the last 3 days she has not been wearing the Andersen brace as per our medical recommendation.  She states that she discontinued because it was irritating her skin against our medical advice.  She states over the last 3 days since she discontinued the brace the pain is getting worse.  She states she has been wearing a sling.  She is taking oxycodone and Tylenol for pain.  She presents with her sister for repeat evaluation to reassess.  She denies any numbness or tingling distally.  She has no other complaints today.  07/21/2023 : ENRIKE TERRY  is a 67 year  old female who presents to the office for follow-up evaluation of her right shoulder humeral shaft fracture.  She states she has been compliant with her restriction in the brace and states she had a little bit of bleeding and irritation underneath the brace the other day but cleaned it up and has had no issues since.  She states she still has a lot of pain in the shoulder and has been taking oxycodone but this is not giving her good relief.  She has a pain management doctor who recently just sent her 60 oxycodone which she has been taking but she is not getting sufficient relief from this.  She has been compliant with her restrictions in the brace.  She is here for routine follow-up to reassess.  She denies any fevers, chills, chest pain, shortness of breath.  She denies any numbness or tingling distally.  7/11/2023: Enrike is a pleasant 67-year-old right-hand-dominant female who presents the office today with her son for evaluation of a right humeral shaft fracture.  The patient states that she tripped and fell on 7/9/2023 onto her right side.  She had immediate pain and swelling went to emergency department where she was seen by the orthopedic team.  X-rays were taken and confirmed a humeral shaft fracture.  A closed reduction and splint application was performed and she was told to follow-up as an outpatient.  She complains of severe pain in the arm.  She is taking oxycodone which helps somewhat with the pain.  She has kept her sling and splint clean and dry.  She has never injured this arm before.  She denies any fevers, chills, sweats, redness, warmth, drainage, notes, tingling, or pain elsewhere.

## 2023-08-07 NOTE — REVIEW OF SYSTEMS
[Joint Pain] : joint pain [Joint Stiffness] : joint stiffness [Joint Swelling] : joint swelling [Negative] : Heme/Lymph [FreeTextEntry9] : Right humerus fracture

## 2023-08-07 NOTE — REASON FOR VISIT
[Follow-Up Visit] : a follow-up visit for [Shoulder Pain] : shoulder pain [Family Member] : family member [FreeTextEntry2] : Right humerus fracture DOI: 07/09/2023

## 2023-08-07 NOTE — PHYSICAL EXAM
[de-identified] : General: Awake, alert, no acute distress, Patient was cooperative and appropriate during the examination.  The patient is obese for height and age.  Walks without an antalgic gait.  Full, painless range of motion of the neck and back.  Exam of the bilateral lower extremities is intact and symmetric with regards to dermatologic, vascular, and neurologic exam. Bilateral lower extremity sensation is grossly intact to light touch in the DP/SP/T/S/S nerve distributions. Intact DF/PF/EHL. BIlateral lower extremities warm and well-perfused with brisk capillary refill.   Pulmonary: Regular, nonlabored breathing  Abdomen: Soft, nontender, nondistended.  Lymphatic: No evidence of inguinal lymphadenopathy  Right upper extremity exam: Physical exam of the upper extremity demonstrates intact skin without any abrasions.  Splint was removed for examination.  There is mild swelling and ecchymosis about the humerus extending towards the medial aspect of the elbow improving from the last office visit.  There is no skin breakdown or skin irritation near where the Andersen brace was previously positioned.  Sensation intact light touch C5-T1 Palpable radial pulse Radial/ulnar/median/axillary/musculocutaneous/AIN/PIN nerves grossly intact  Range of motion: Not tested today  Palpation: Exquisitely tender to palpation of the humeral shaft Not tender to palpation about the scapula or clavicle Not tender to palpation about the forearm or wrist  Strength testing: Not tested today secondary to fracture  Special test: Not tested today secondary to fracture [de-identified] : X-ray 2 views of the right shoulder taken in the office today on 7/31/2023 reveals a comminuted displaced spiral fracture of the midshaft of the humerus in similar position compared to previous x-rays.  X-ray 2 views of the right shoulder taken in the office today on 7/21/2023 shows a comminuted displaced spiral fracture of the midshaft of the humerus in similar position compared to previous x-rays.  X-rays including multiple pre and postreduction films of the right humerus from [facility] on 7/9/2023 were reviewed today.  The patient has a spiral fracture of the midshaft of the humerus with fracture extension proximally into the proximal humerus.  Postreduction films demonstrate angulation of the fracture in varus.  X-rays including 2 postreduction views of the right humerus were obtained after Andersen brace application today in the office.  These films demonstrate resolution of the varus angulation in better alignment compared to hospital films.

## 2023-08-08 NOTE — HISTORY OF PRESENT ILLNESS
[FreeTextEntry1] : 67 year old female with a history of crohns disease who presents for a follow up visit. She had a colonoscopy in 2021 with EGD and recommendation was to repeat in 2 years. She presents for follow up visit. She is only on mesalamine for crohns and reports rectal bleeding. No significant abdominal pain althug she has focal discomfort in the epigastric area from prior surgery /sutures. She is having regular bowel movements. She is getting iron infusions as a result of anemia.   Her recent history is significant for coma and sepsis following kidney stones surgery at [facility]. 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 She was then discharged and rehospitalized at [facility]. She had upper and lower endoscopy - report not available

## 2023-08-08 NOTE — PHYSICAL EXAM
[Respiratory Effort] : normal respiratory effort [Calm] : calm [de-identified] : soft, non tender, surgical scars noted [de-identified] : well appearing, in no distress [de-identified] : normocephalic, atraumatic [de-identified] : RUE in sling [de-identified] : warm and dry [de-identified] : Alert and oriented

## 2023-08-08 NOTE — PLAN
[TextEntry] : 67 year old female with a history of crohns disease with rectal bleeding although had recent colonoscopy. I only received a EGD reports and flex sig report from 2009. Will continue to work to obtain records. She has a follow up appointment with GI in a week for medical management and adjustment of current medication if necessary . Follow up in 2 months.

## 2023-08-15 LAB — HBA1C MFR BLD HPLC: 6.6

## 2023-08-16 ENCOUNTER — OUTPATIENT (OUTPATIENT)
Dept: OUTPATIENT SERVICES | Facility: HOSPITAL | Age: 68
LOS: 1 days | End: 2023-08-16
Payer: MEDICARE

## 2023-08-16 ENCOUNTER — APPOINTMENT (OUTPATIENT)
Dept: INTERNAL MEDICINE | Facility: CLINIC | Age: 68
End: 2023-08-16
Payer: MEDICARE

## 2023-08-16 ENCOUNTER — APPOINTMENT (OUTPATIENT)
Dept: ULTRASOUND IMAGING | Facility: CLINIC | Age: 68
End: 2023-08-16
Payer: MEDICARE

## 2023-08-16 ENCOUNTER — LABORATORY RESULT (OUTPATIENT)
Age: 68
End: 2023-08-16

## 2023-08-16 VITALS
DIASTOLIC BLOOD PRESSURE: 62 MMHG | SYSTOLIC BLOOD PRESSURE: 118 MMHG | OXYGEN SATURATION: 97 % | BODY MASS INDEX: 36.37 KG/M2 | TEMPERATURE: 97.6 F | HEART RATE: 93 BPM | WEIGHT: 213 LBS | HEIGHT: 64 IN

## 2023-08-16 DIAGNOSIS — Z98.89 OTHER SPECIFIED POSTPROCEDURAL STATES: Chronic | ICD-10-CM

## 2023-08-16 DIAGNOSIS — Z96.653 PRESENCE OF ARTIFICIAL KNEE JOINT, BILATERAL: Chronic | ICD-10-CM

## 2023-08-16 DIAGNOSIS — L02.92 FURUNCLE, UNSPECIFIED: Chronic | ICD-10-CM

## 2023-08-16 DIAGNOSIS — Z41.1 ENCOUNTER FOR COSMETIC SURGERY: Chronic | ICD-10-CM

## 2023-08-16 DIAGNOSIS — R60.0 LOCALIZED EDEMA: ICD-10-CM

## 2023-08-16 DIAGNOSIS — M75.120 COMPLETE ROTATOR CUFF TEAR OR RUPTURE OF UNSPECIFIED SHOULDER, NOT SPECIFIED AS TRAUMATIC: Chronic | ICD-10-CM

## 2023-08-16 PROCEDURE — 93970 EXTREMITY STUDY: CPT | Mod: 26

## 2023-08-16 PROCEDURE — 93970 EXTREMITY STUDY: CPT

## 2023-08-16 PROCEDURE — 99214 OFFICE O/P EST MOD 30 MIN: CPT

## 2023-08-17 DIAGNOSIS — R74.8 ABNORMAL LEVELS OF OTHER SERUM ENZYMES: ICD-10-CM

## 2023-08-17 RX ORDER — FUROSEMIDE 20 MG/1
20 TABLET ORAL
Qty: 90 | Refills: 0 | Status: DISCONTINUED | COMMUNITY
Start: 2021-09-21 | End: 2023-08-17

## 2023-08-17 NOTE — HISTORY OF PRESENT ILLNESS
[Other: _____] : [unfilled] [FreeTextEntry8] : 67F PMH PE on Eliquis, DM, RADHA, hypothyroidism, recurrent paroxysmal afib, asthma, Crohn's, seizures, anxiety/depression, and recent right humeral fracture presenting for bilateral lower extremity swelling for the past 2 weeks. She states that she had Botox for her bladder due to urinary frequency symptoms at [facility] and since then her legs have been getting more swollen and tighter. She denies any redness in her legs and says she is able to have strength and sensation but there is pain with extension and dorsiflexion of both feet. She has been also dealing with a right humeral fracture with a cast and because of it has been more sedentary at home, sitting in a chair. She notes some baseline shortness of breath and denies chest pain. Her recent TSH, kidney, and liver function done a few weeks was normal. She is reporting some pelvic discomfort after the Botox procedure. She denies any fevers, chills, and myalgias. She is not endorsing further urinary frequency or other urinary symptoms. She takes lasix 20mg daily.

## 2023-08-17 NOTE — PHYSICAL EXAM
[Normal] : normal rate, regular rhythm, normal S1 and S2 and no murmur heard [de-identified] : soft, mildly distension (patient reports baseline), no pain on palpation. [de-identified] : pain on palpation of both legs and upon dorsiflexion and extension of foot. 1+ pitting edema. Equal size of both legs. No redness or ulcerations noted. 2nd and 4th toe amputed on left foot, and 2nd to 5th amputed on right foot due to gangrene 7 months ago during hospitalization. Cast on right shoulder/arm

## 2023-08-17 NOTE — REVIEW OF SYSTEMS
[Negative] : Integumentary [FreeTextEntry7] : pelvis discomfort [FreeTextEntry9] : bilateral leg swelling

## 2023-08-18 ENCOUNTER — APPOINTMENT (OUTPATIENT)
Dept: SURGERY | Facility: CLINIC | Age: 68
End: 2023-08-18
Payer: MEDICARE

## 2023-08-18 DIAGNOSIS — I73.9 PERIPHERAL VASCULAR DISEASE, UNSPECIFIED: ICD-10-CM

## 2023-08-18 PROCEDURE — 99214 OFFICE O/P EST MOD 30 MIN: CPT

## 2023-08-18 NOTE — CONSULT LETTER
[Dear  ___] : Dear  [unfilled], [Consult Letter:] : I had the pleasure of evaluating your patient, [unfilled]. [Please see my note below.] : Please see my note below. [Consult Closing:] : Thank you very much for allowing me to participate in the care of this patient.  If you have any questions, please do not hesitate to contact me. [Sincerely,] : Sincerely, [FreeTextEntry3] : Wm Demario ABURTO

## 2023-08-18 NOTE — REVIEW OF SYSTEMS
[Fever] : no fever [Loss Of Hearing] : hearing loss [Chest Pain] : no chest pain [Shortness Of Breath] : no shortness of breath [Convulsions] : no convulsions [Easy Bleeding] : no tendency for easy bleeding [Easy Bruising] : no tendency for easy bruising

## 2023-08-18 NOTE — HISTORY OF PRESENT ILLNESS
[de-identified] : RLE arterial stent placed at [facility]. Pt had complex hospital course involving stays at [facility] and [facility]. Had R femoral arterial stent and toe amputations. Here for f/u of stent. Duplex 2 days ago negative for DVT.

## 2023-08-18 NOTE — PHYSICAL EXAM
[JVD] : no jugular venous distention  [Normal Breath Sounds] : Normal breath sounds [Normal Heart Sounds] : normal heart sounds [2+] : left 2+ [No Rash or Lesion] : No rash or lesion [Alert] : alert [Oriented to Person] : oriented to person [Oriented to Place] : oriented to place [Oriented to Time] : oriented to time [Calm] : calm [de-identified] : NAD [de-identified] : NC/AT PER [de-identified] : soft [de-identified] : moves all 4 extr 5/5

## 2023-08-21 ENCOUNTER — RESULT REVIEW (OUTPATIENT)
Age: 68
End: 2023-08-21

## 2023-08-21 LAB
ALBUMIN SERPL ELPH-MCNC: 4.2 G/DL
ALP BLD-CCNC: 260 U/L
ALT SERPL-CCNC: 52 U/L
ANION GAP SERPL CALC-SCNC: 17 MMOL/L
AST SERPL-CCNC: 38 U/L
BILIRUB SERPL-MCNC: 0.3 MG/DL
BUN SERPL-MCNC: 16 MG/DL
CALCIUM SERPL-MCNC: 9.7 MG/DL
CHLORIDE SERPL-SCNC: 100 MMOL/L
CO2 SERPL-SCNC: 26 MMOL/L
CREAT SERPL-MCNC: 1.02 MG/DL
EGFR: 61 ML/MIN/1.73M2
GGT SERPL-CCNC: 45 U/L
GLUCOSE SERPL-MCNC: 192 MG/DL
POTASSIUM SERPL-SCNC: 4.5 MMOL/L
PROT SERPL-MCNC: 6.5 G/DL
SODIUM SERPL-SCNC: 143 MMOL/L
T4 FREE SERPL-MCNC: 1 NG/DL
TSH SERPL-ACNC: 4.9 UIU/ML
TSH SERPL-ACNC: 4.99 UIU/ML

## 2023-08-22 ENCOUNTER — OUTPATIENT (OUTPATIENT)
Dept: OUTPATIENT SERVICES | Facility: HOSPITAL | Age: 68
LOS: 1 days | End: 2023-08-22
Payer: MEDICARE

## 2023-08-22 ENCOUNTER — APPOINTMENT (OUTPATIENT)
Dept: ULTRASOUND IMAGING | Facility: CLINIC | Age: 68
End: 2023-08-22
Payer: MEDICARE

## 2023-08-22 ENCOUNTER — LABORATORY RESULT (OUTPATIENT)
Age: 68
End: 2023-08-22

## 2023-08-22 DIAGNOSIS — Z98.89 OTHER SPECIFIED POSTPROCEDURAL STATES: Chronic | ICD-10-CM

## 2023-08-22 DIAGNOSIS — Z90.49 ACQUIRED ABSENCE OF OTHER SPECIFIED PARTS OF DIGESTIVE TRACT: Chronic | ICD-10-CM

## 2023-08-22 DIAGNOSIS — Z98.84 BARIATRIC SURGERY STATUS: Chronic | ICD-10-CM

## 2023-08-22 DIAGNOSIS — M75.120 COMPLETE ROTATOR CUFF TEAR OR RUPTURE OF UNSPECIFIED SHOULDER, NOT SPECIFIED AS TRAUMATIC: Chronic | ICD-10-CM

## 2023-08-22 DIAGNOSIS — Z96.653 PRESENCE OF ARTIFICIAL KNEE JOINT, BILATERAL: Chronic | ICD-10-CM

## 2023-08-22 DIAGNOSIS — L02.92 FURUNCLE, UNSPECIFIED: Chronic | ICD-10-CM

## 2023-08-22 DIAGNOSIS — R74.8 ABNORMAL LEVELS OF OTHER SERUM ENZYMES: ICD-10-CM

## 2023-08-22 DIAGNOSIS — Z41.1 ENCOUNTER FOR COSMETIC SURGERY: Chronic | ICD-10-CM

## 2023-08-22 DIAGNOSIS — K46.9 UNSPECIFIED ABDOMINAL HERNIA WITHOUT OBSTRUCTION OR GANGRENE: Chronic | ICD-10-CM

## 2023-08-22 PROCEDURE — 76705 ECHO EXAM OF ABDOMEN: CPT

## 2023-08-22 PROCEDURE — 76705 ECHO EXAM OF ABDOMEN: CPT | Mod: 26

## 2023-08-25 ENCOUNTER — APPOINTMENT (OUTPATIENT)
Dept: ORTHOPEDIC SURGERY | Facility: CLINIC | Age: 68
End: 2023-08-25
Payer: MEDICARE

## 2023-08-25 DIAGNOSIS — S42.309A UNSPECIFIED FRACTURE OF SHAFT OF HUMERUS, UNSPECIFIED ARM, INITIAL ENCOUNTER FOR CLOSED FRACTURE: ICD-10-CM

## 2023-08-25 LAB — TSH SERPL-ACNC: 6.38 UIU/ML

## 2023-08-25 PROCEDURE — 99024 POSTOP FOLLOW-UP VISIT: CPT

## 2023-08-25 PROCEDURE — 73060 X-RAY EXAM OF HUMERUS: CPT

## 2023-08-25 NOTE — DISCUSSION/SUMMARY
[de-identified] : Assessment: 67-year-old female with a right humeral shaft fracture  Plan: I had a long discussion with the patient today regarding the nature of their diagnosis and treatment plan.  I reviewed the patient's x-rays today with her in the office which demonstrate that her right humeral shaft fracture remains in acceptable unchanged alignment with early callus formation noted and consolidation noted.  At this time she can discontinue the Andersen brace and discontinue the sling and progress with active and passive range of motion of the shoulder.  She will avoid any strengthening, heavy lifting, pushing or pulling with the right upper extremity.  I emphasized the importance of this to protect the healing fracture.  She was given a physical therapy prescription today.  She will follow-up in 4 to 6 weeks for repeat evaluation and new x-rays of the right humerus.  Patient discussed and reviewed with Dr. Han today.  The patient verbalizes their understanding and agrees with the plan.  All questions were answered to their satisfaction.

## 2023-08-25 NOTE — PHYSICAL EXAM
[de-identified] : General: Awake, alert, no acute distress, Patient was cooperative and appropriate during the examination.  The patient is obese for height and age.  Walks without an antalgic gait.  Full, painless range of motion of the neck and back.  Exam of the bilateral lower extremities is intact and symmetric with regards to dermatologic, vascular, and neurologic exam. Bilateral lower extremity sensation is grossly intact to light touch in the DP/SP/T/S/S nerve distributions. Intact DF/PF/EHL. BIlateral lower extremities warm and well-perfused with brisk capillary refill.   Pulmonary: Regular, nonlabored breathing  Abdomen: Soft, nontender, nondistended.  Lymphatic: No evidence of inguinal lymphadenopathy  Right upper extremity exam: Physical exam of the upper extremity demonstrates intact skin.  Splint was removed for examination.  Swelling and ecchymosis are improved.  No evidence of skin breakdown about the brace.  There are some superficial abrasions underneath the brace from her scratching which she admits to.  There is no evidence of infection.  Sensation intact light touch C5-T1 Palpable radial pulse Radial/ulnar/median/axillary/musculocutaneous/AIN/PIN nerves grossly intact  Range of motion: Tolerates gentle range of motion  Palpation: Moderately tender to palpation of the humeral shaft Not tender to palpation about the scapula or clavicle Not tender to palpation about the forearm or wrist  Strength testing: Not tested today secondary to fracture  Special test: Not tested today secondary to fracture [de-identified] : X-rays 2 views of the humeral shaft taken in the office today on 8/25/2023 shows a well-healing right humeral shaft fracture with abundant callus formation and consolidation noted, well-healing and acceptable alignment.  X-rays including 2 views of the right humerus were obtained in the office on 8/7/2023 and reviewed the patient.  Patient's humerus fracture is unchanged in alignment with early callus formation noted about the fracture site.  X-ray 2 views of the right shoulder taken in the office on 7/31/2023 reveals a comminuted displaced spiral fracture of the midshaft of the humerus in similar position compared to previous x-rays.  X-ray 2 views of the right shoulder taken in the office on 7/21/2023 shows a comminuted displaced spiral fracture of the midshaft of the humerus in similar position compared to previous x-rays.  X-rays including multiple pre and postreduction films of the right humerus from [facility] on 7/9/2023 were reviewed today.  The patient has a spiral fracture of the midshaft of the humerus with fracture extension proximally into the proximal humerus.  Postreduction films demonstrate angulation of the fracture in varus.  X-rays including 2 postreduction views of the right humerus were obtained after Andersen brace application today in the office.  These films demonstrate resolution of the varus angulation in better alignment compared to hospital films.

## 2023-08-25 NOTE — CONSULT LETTER
[Dear  ___] : Dear  [unfilled], [Consult Letter:] : I had the pleasure of evaluating your patient, [unfilled]. [( Thank you for referring [unfilled] for consultation for _____ )] : Thank you for referring [unfilled] for consultation for [unfilled] [Please see my note below.] : Please see my note below. [Consult Closing:] : Thank you very much for allowing me to participate in the care of this patient.  If you have any questions, please do not hesitate to contact me. [Sincerely,] : Sincerely, [FreeTextEntry2] : JOHN REYES\par   [FreeTextEntry3] : Rafat Han DO.\par  Sports Medicine \par  Orthopaedic Surgery\par  \par  [facility] \par  [address]\[place] [address]\par  \par  Tel [phone]\par  Fax [phone]\par  \par  For same day and next day orthopedic appointments contact:\par  [email or web address] |[phone]  Appointments available nights and weekends!  \par  \par  [facility] [place]\par  Visit us at [facility]/orthopaedic

## 2023-08-25 NOTE — HISTORY OF PRESENT ILLNESS
[Worsening] : worsening [Constant] : ~He/She~ states the symptoms seem to be constant [Direct Pressure] : worsened by direct pressure [Lifting] : worsened by lifting [Rest] : relieved by rest [de-identified] : DOI: 07/09/2023 08/25/2023 : ENRIKE TERRY  is a 67 year old female who presents to the office for evaluation of her right humeral shaft fracture.  She states that since the last office visit she has been compliant with her restrictions in the brace and states that her pain and range of motion is getting better.  She has been doing gentle pendulums and elbow motion and states that she generally is improving.  She is here for follow-up evaluation to reassess.  she states that her numbness and tingling into her fingers is improving and she has good motion of her hand.  She has no other complaints today.  She denies any new injury.   08/07/2023: ENRIKE TERRY is a 67 year old female who presents for follow-up regarding her right humeral shaft fracture.  The patient states her pain is improving since her last appointment.  She has been compliant with the Andersen since her last appointment.  She returns today for routine clinical and radiographic follow-up.  She denies any new injury.  She denies any fevers, chills, sweats, or pain elsewhere.  07/31/2023 : ENRKIE TERRY  is a 67 year old female who presents to the office for follow-up evaluation of her right humeral shaft fracture.  She states that for the last 3 days she has not been wearing the Andersen brace as per our medical recommendation.  She states that she discontinued because it was irritating her skin against our medical advice.  She states over the last 3 days since she discontinued the brace the pain is getting worse.  She states she has been wearing a sling.  She is taking oxycodone and Tylenol for pain.  She presents with her sister for repeat evaluation to reassess.  She denies any numbness or tingling distally.  She has no other complaints today.  07/21/2023 : ENRIKE TERRY  is a 67 year  old female who presents to the office for follow-up evaluation of her right shoulder humeral shaft fracture.  She states she has been compliant with her restriction in the brace and states she had a little bit of bleeding and irritation underneath the brace the other day but cleaned it up and has had no issues since.  She states she still has a lot of pain in the shoulder and has been taking oxycodone but this is not giving her good relief.  She has a pain management doctor who recently just sent her 60 oxycodone which she has been taking but she is not getting sufficient relief from this.  She has been compliant with her restrictions in the brace.  She is here for routine follow-up to reassess.  She denies any fevers, chills, chest pain, shortness of breath.  She denies any numbness or tingling distally.  7/11/2023: Enrike is a pleasant 67-year-old right-hand-dominant female who presents the office today with her son for evaluation of a right humeral shaft fracture.  The patient states that she tripped and fell on 7/9/2023 onto her right side.  She had immediate pain and swelling went to emergency department where she was seen by the orthopedic team.  X-rays were taken and confirmed a humeral shaft fracture.  A closed reduction and splint application was performed and she was told to follow-up as an outpatient.  She complains of severe pain in the arm.  She is taking oxycodone which helps somewhat with the pain.  She has kept her sling and splint clean and dry.  She has never injured this arm before.  She denies any fevers, chills, sweats, redness, warmth, drainage, notes, tingling, or pain elsewhere.

## 2023-09-01 ENCOUNTER — RX RENEWAL (OUTPATIENT)
Age: 68
End: 2023-09-01

## 2023-09-07 ENCOUNTER — OUTPATIENT (OUTPATIENT)
Dept: OUTPATIENT SERVICES | Facility: HOSPITAL | Age: 68
LOS: 1 days | End: 2023-09-07
Payer: MEDICARE

## 2023-09-07 ENCOUNTER — RESULT REVIEW (OUTPATIENT)
Age: 68
End: 2023-09-07

## 2023-09-07 ENCOUNTER — APPOINTMENT (OUTPATIENT)
Dept: MAMMOGRAPHY | Facility: CLINIC | Age: 68
End: 2023-09-07
Payer: MEDICARE

## 2023-09-07 DIAGNOSIS — Z90.49 ACQUIRED ABSENCE OF OTHER SPECIFIED PARTS OF DIGESTIVE TRACT: Chronic | ICD-10-CM

## 2023-09-07 DIAGNOSIS — Z41.1 ENCOUNTER FOR COSMETIC SURGERY: Chronic | ICD-10-CM

## 2023-09-07 DIAGNOSIS — D64.9 ANEMIA, UNSPECIFIED: ICD-10-CM

## 2023-09-07 DIAGNOSIS — K46.9 UNSPECIFIED ABDOMINAL HERNIA WITHOUT OBSTRUCTION OR GANGRENE: Chronic | ICD-10-CM

## 2023-09-07 DIAGNOSIS — L02.92 FURUNCLE, UNSPECIFIED: Chronic | ICD-10-CM

## 2023-09-07 DIAGNOSIS — M75.120 COMPLETE ROTATOR CUFF TEAR OR RUPTURE OF UNSPECIFIED SHOULDER, NOT SPECIFIED AS TRAUMATIC: Chronic | ICD-10-CM

## 2023-09-07 DIAGNOSIS — Z98.89 OTHER SPECIFIED POSTPROCEDURAL STATES: Chronic | ICD-10-CM

## 2023-09-07 DIAGNOSIS — E11.9 TYPE 2 DIABETES MELLITUS WITHOUT COMPLICATIONS: ICD-10-CM

## 2023-09-07 DIAGNOSIS — F32.A DEPRESSION, UNSPECIFIED: ICD-10-CM

## 2023-09-07 DIAGNOSIS — F41.9 ANXIETY DISORDER, UNSPECIFIED: ICD-10-CM

## 2023-09-07 DIAGNOSIS — Z98.84 BARIATRIC SURGERY STATUS: Chronic | ICD-10-CM

## 2023-09-07 DIAGNOSIS — M81.0 AGE-RELATED OSTEOPOROSIS WITHOUT CURRENT PATHOLOGICAL FRACTURE: ICD-10-CM

## 2023-09-07 DIAGNOSIS — Z96.653 PRESENCE OF ARTIFICIAL KNEE JOINT, BILATERAL: Chronic | ICD-10-CM

## 2023-09-07 PROCEDURE — 77063 BREAST TOMOSYNTHESIS BI: CPT

## 2023-09-07 PROCEDURE — 77063 BREAST TOMOSYNTHESIS BI: CPT | Mod: 26

## 2023-09-07 PROCEDURE — 77067 SCR MAMMO BI INCL CAD: CPT

## 2023-09-07 PROCEDURE — 77067 SCR MAMMO BI INCL CAD: CPT | Mod: 26

## 2023-09-12 ENCOUNTER — RX RENEWAL (OUTPATIENT)
Age: 68
End: 2023-09-12

## 2023-09-12 RX ORDER — BLOOD SUGAR DIAGNOSTIC
STRIP MISCELLANEOUS DAILY
Qty: 100 | Refills: 0 | Status: ACTIVE | COMMUNITY
Start: 2020-11-09 | End: 1900-01-01

## 2023-09-13 ENCOUNTER — RX RENEWAL (OUTPATIENT)
Age: 68
End: 2023-09-13

## 2023-09-13 RX ORDER — METFORMIN HYDROCHLORIDE 1000 MG/1
1000 TABLET, COATED ORAL
Qty: 180 | Refills: 0 | Status: ACTIVE | COMMUNITY
Start: 2017-12-08 | End: 1900-01-01

## 2023-09-26 ENCOUNTER — APPOINTMENT (OUTPATIENT)
Dept: NEUROLOGY | Facility: CLINIC | Age: 68
End: 2023-09-26
Payer: MEDICARE

## 2023-09-26 VITALS
WEIGHT: 221 LBS | HEART RATE: 80 BPM | BODY MASS INDEX: 37.73 KG/M2 | TEMPERATURE: 98.2 F | SYSTOLIC BLOOD PRESSURE: 116 MMHG | DIASTOLIC BLOOD PRESSURE: 77 MMHG | HEIGHT: 64 IN

## 2023-09-26 PROCEDURE — 99214 OFFICE O/P EST MOD 30 MIN: CPT

## 2023-09-27 ENCOUNTER — APPOINTMENT (OUTPATIENT)
Dept: DERMATOLOGY | Facility: CLINIC | Age: 68
End: 2023-09-27
Payer: MEDICARE

## 2023-09-27 DIAGNOSIS — L60.4 BEAU'S LINES: ICD-10-CM

## 2023-09-27 PROCEDURE — 99213 OFFICE O/P EST LOW 20 MIN: CPT

## 2023-09-29 ENCOUNTER — APPOINTMENT (OUTPATIENT)
Dept: ORTHOPEDIC SURGERY | Facility: CLINIC | Age: 68
End: 2023-09-29
Payer: MEDICARE

## 2023-09-29 VITALS
SYSTOLIC BLOOD PRESSURE: 107 MMHG | DIASTOLIC BLOOD PRESSURE: 75 MMHG | WEIGHT: 221 LBS | BODY MASS INDEX: 37.73 KG/M2 | HEART RATE: 87 BPM | HEIGHT: 64 IN

## 2023-09-29 DIAGNOSIS — S42.309A UNSPECIFIED FRACTURE OF SHAFT OF HUMERUS, UNSPECIFIED ARM, INITIAL ENCOUNTER FOR CLOSED FRACTURE: ICD-10-CM

## 2023-09-29 PROCEDURE — 99024 POSTOP FOLLOW-UP VISIT: CPT

## 2023-09-29 PROCEDURE — 73060 X-RAY EXAM OF HUMERUS: CPT

## 2023-10-04 ENCOUNTER — APPOINTMENT (OUTPATIENT)
Dept: MRI IMAGING | Facility: CLINIC | Age: 68
End: 2023-10-04

## 2023-10-05 ENCOUNTER — APPOINTMENT (OUTPATIENT)
Dept: FAMILY MEDICINE | Facility: CLINIC | Age: 68
End: 2023-10-05
Payer: MEDICARE

## 2023-10-05 ENCOUNTER — APPOINTMENT (OUTPATIENT)
Dept: NEUROLOGY | Facility: CLINIC | Age: 68
End: 2023-10-05
Payer: MEDICARE

## 2023-10-05 VITALS
RESPIRATION RATE: 16 BRPM | OXYGEN SATURATION: 97 % | HEIGHT: 64 IN | HEART RATE: 89 BPM | BODY MASS INDEX: 40.97 KG/M2 | DIASTOLIC BLOOD PRESSURE: 70 MMHG | WEIGHT: 240 LBS | SYSTOLIC BLOOD PRESSURE: 108 MMHG

## 2023-10-05 DIAGNOSIS — Z23 ENCOUNTER FOR IMMUNIZATION: ICD-10-CM

## 2023-10-05 PROCEDURE — 90662 IIV NO PRSV INCREASED AG IM: CPT

## 2023-10-05 PROCEDURE — 95816 EEG AWAKE AND DROWSY: CPT

## 2023-10-05 PROCEDURE — 99214 OFFICE O/P EST MOD 30 MIN: CPT | Mod: 25

## 2023-10-05 PROCEDURE — G0008: CPT

## 2023-10-05 PROCEDURE — 96401 CHEMO ANTI-NEOPL SQ/IM: CPT

## 2023-10-05 RX ORDER — FLASH GLUCOSE SENSOR
KIT MISCELLANEOUS
Qty: 2 | Refills: 3 | Status: ACTIVE | COMMUNITY
Start: 2023-08-04 | End: 1900-01-01

## 2023-10-05 RX ORDER — BLOOD SUGAR DIAGNOSTIC
STRIP MISCELLANEOUS DAILY
Qty: 1 | Refills: 2 | Status: ACTIVE | COMMUNITY
Start: 2019-09-03 | End: 1900-01-01

## 2023-10-05 RX ORDER — ALPRAZOLAM 0.25 MG/1
0.25 TABLET ORAL
Qty: 30 | Refills: 0 | Status: ACTIVE | COMMUNITY
Start: 2023-02-13 | End: 1900-01-01

## 2023-10-06 ENCOUNTER — APPOINTMENT (OUTPATIENT)
Dept: NEUROLOGY | Facility: CLINIC | Age: 68
End: 2023-10-06
Payer: MEDICARE

## 2023-10-06 PROCEDURE — 95708 EEG WO VID EA 12-26HR UNMNTR: CPT

## 2023-10-06 PROCEDURE — 95700 EEG CONT REC W/VID EEG TECH: CPT

## 2023-10-06 PROCEDURE — 95719 EEG PHYS/QHP EA INCR W/O VID: CPT

## 2023-10-08 RX ADMIN — DENOSUMAB 0 MG/ML: 60 INJECTION SUBCUTANEOUS at 00:00

## 2023-10-09 RX ORDER — DENOSUMAB 60 MG/ML
60 INJECTION SUBCUTANEOUS
Qty: 1 | Refills: 0 | Status: COMPLETED | OUTPATIENT
Start: 2023-10-08

## 2023-10-11 ENCOUNTER — OUTPATIENT (OUTPATIENT)
Dept: OUTPATIENT SERVICES | Facility: HOSPITAL | Age: 68
LOS: 1 days | End: 2023-10-11
Payer: MEDICARE

## 2023-10-11 ENCOUNTER — APPOINTMENT (OUTPATIENT)
Dept: MRI IMAGING | Facility: CLINIC | Age: 68
End: 2023-10-11
Payer: MEDICARE

## 2023-10-11 ENCOUNTER — NON-APPOINTMENT (OUTPATIENT)
Age: 68
End: 2023-10-11

## 2023-10-11 ENCOUNTER — APPOINTMENT (OUTPATIENT)
Dept: COLORECTAL SURGERY | Facility: CLINIC | Age: 68
End: 2023-10-11
Payer: MEDICARE

## 2023-10-11 DIAGNOSIS — K46.9 UNSPECIFIED ABDOMINAL HERNIA WITHOUT OBSTRUCTION OR GANGRENE: Chronic | ICD-10-CM

## 2023-10-11 DIAGNOSIS — Z96.653 PRESENCE OF ARTIFICIAL KNEE JOINT, BILATERAL: Chronic | ICD-10-CM

## 2023-10-11 DIAGNOSIS — Z98.89 OTHER SPECIFIED POSTPROCEDURAL STATES: Chronic | ICD-10-CM

## 2023-10-11 DIAGNOSIS — R19.7 DIARRHEA, UNSPECIFIED: ICD-10-CM

## 2023-10-11 DIAGNOSIS — R41.3 OTHER AMNESIA: ICD-10-CM

## 2023-10-11 DIAGNOSIS — Z98.84 BARIATRIC SURGERY STATUS: Chronic | ICD-10-CM

## 2023-10-11 DIAGNOSIS — L02.92 FURUNCLE, UNSPECIFIED: Chronic | ICD-10-CM

## 2023-10-11 PROCEDURE — 99214 OFFICE O/P EST MOD 30 MIN: CPT

## 2023-10-11 PROCEDURE — 70551 MRI BRAIN STEM W/O DYE: CPT | Mod: 26

## 2023-10-11 PROCEDURE — 70551 MRI BRAIN STEM W/O DYE: CPT

## 2023-10-12 ENCOUNTER — NON-APPOINTMENT (OUTPATIENT)
Age: 68
End: 2023-10-12

## 2023-10-12 LAB
ANION GAP SERPL CALC-SCNC: 11 MMOL/L
BUN SERPL-MCNC: 8 MG/DL
CALCIUM SERPL-MCNC: 9.3 MG/DL
CHLORIDE SERPL-SCNC: 104 MMOL/L
CO2 SERPL-SCNC: 27 MMOL/L
CREAT SERPL-MCNC: 0.57 MG/DL
EGFR: 100 ML/MIN/1.73M2
GLUCOSE SERPL-MCNC: 221 MG/DL
HCT VFR BLD CALC: 34 %
HGB BLD-MCNC: 10 G/DL
MCHC RBC-ENTMCNC: 29.4 GM/DL
MCHC RBC-ENTMCNC: 32.5 PG
MCV RBC AUTO: 110.4 FL
PLATELET # BLD AUTO: 190 K/UL
POTASSIUM SERPL-SCNC: 4.6 MMOL/L
RBC # BLD: 3.08 M/UL
RBC # FLD: 16.2 %
SODIUM SERPL-SCNC: 142 MMOL/L
WBC # FLD AUTO: 6.22 K/UL

## 2023-10-16 ENCOUNTER — NON-APPOINTMENT (OUTPATIENT)
Age: 68
End: 2023-10-16

## 2023-10-16 LAB — BACTERIA STL CULT: NORMAL

## 2023-10-19 ENCOUNTER — APPOINTMENT (OUTPATIENT)
Dept: CT IMAGING | Facility: CLINIC | Age: 68
End: 2023-10-19

## 2023-10-26 ENCOUNTER — NON-APPOINTMENT (OUTPATIENT)
Age: 68
End: 2023-10-26

## 2023-10-26 ENCOUNTER — APPOINTMENT (OUTPATIENT)
Dept: CARDIOLOGY | Facility: CLINIC | Age: 68
End: 2023-10-26
Payer: MEDICARE

## 2023-10-26 VITALS
SYSTOLIC BLOOD PRESSURE: 126 MMHG | WEIGHT: 223 LBS | HEART RATE: 65 BPM | DIASTOLIC BLOOD PRESSURE: 80 MMHG | OXYGEN SATURATION: 97 % | HEIGHT: 64 IN | BODY MASS INDEX: 38.07 KG/M2

## 2023-10-26 DIAGNOSIS — R94.31 ABNORMAL ELECTROCARDIOGRAM [ECG] [EKG]: ICD-10-CM

## 2023-10-26 PROCEDURE — 93242 EXT ECG>48HR<7D RECORDING: CPT

## 2023-10-26 PROCEDURE — 93000 ELECTROCARDIOGRAM COMPLETE: CPT | Mod: 59

## 2023-10-26 PROCEDURE — 99214 OFFICE O/P EST MOD 30 MIN: CPT

## 2023-10-31 ENCOUNTER — APPOINTMENT (OUTPATIENT)
Dept: FAMILY MEDICINE | Facility: CLINIC | Age: 68
End: 2023-10-31
Payer: MEDICARE

## 2023-10-31 VITALS
BODY MASS INDEX: 37.9 KG/M2 | TEMPERATURE: 97 F | OXYGEN SATURATION: 96 % | DIASTOLIC BLOOD PRESSURE: 80 MMHG | WEIGHT: 222 LBS | HEIGHT: 64 IN | HEART RATE: 80 BPM | SYSTOLIC BLOOD PRESSURE: 132 MMHG

## 2023-10-31 DIAGNOSIS — F41.9 ANXIETY DISORDER, UNSPECIFIED: ICD-10-CM

## 2023-10-31 DIAGNOSIS — R42 DIZZINESS AND GIDDINESS: ICD-10-CM

## 2023-10-31 DIAGNOSIS — R56.9 UNSPECIFIED CONVULSIONS: ICD-10-CM

## 2023-10-31 PROCEDURE — 99215 OFFICE O/P EST HI 40 MIN: CPT

## 2023-10-31 RX ORDER — AMITRIPTYLINE HYDROCHLORIDE 25 MG/1
25 TABLET, FILM COATED ORAL
Qty: 30 | Refills: 2 | Status: COMPLETED | COMMUNITY
Start: 2023-02-13 | End: 2023-10-31

## 2023-11-02 ENCOUNTER — APPOINTMENT (OUTPATIENT)
Dept: CARDIOLOGY | Facility: CLINIC | Age: 68
End: 2023-11-02
Payer: MEDICARE

## 2023-11-02 PROCEDURE — 93017 CV STRESS TEST TRACING ONLY: CPT

## 2023-11-02 PROCEDURE — 93016 CV STRESS TEST SUPVJ ONLY: CPT

## 2023-11-02 PROCEDURE — A9500: CPT

## 2023-11-02 PROCEDURE — 78452 HT MUSCLE IMAGE SPECT MULT: CPT

## 2023-11-02 PROCEDURE — 93018 CV STRESS TEST I&R ONLY: CPT

## 2023-11-04 PROBLEM — R56.9 SEIZURE: Status: ACTIVE | Noted: 2020-08-20

## 2023-11-04 PROBLEM — R42 DIZZINESS: Status: ACTIVE | Noted: 2020-08-06

## 2023-11-04 PROBLEM — F41.9 ANXIETY: Status: ACTIVE | Noted: 2021-02-19

## 2023-11-04 RX ORDER — AMITRIPTYLINE HYDROCHLORIDE 10 MG/1
10 TABLET, FILM COATED ORAL
Qty: 90 | Refills: 0 | Status: DISCONTINUED | COMMUNITY
Start: 2023-05-30 | End: 2023-11-04

## 2023-11-04 RX ORDER — TRIAMCINOLONE ACETONIDE 1 MG/G
0.1 CREAM TOPICAL TWICE DAILY
Qty: 1 | Refills: 1 | Status: COMPLETED | COMMUNITY
Start: 2021-04-23 | End: 2023-11-04

## 2023-11-04 RX ORDER — ESTRADIOL 0.1 MG/G
0.1 CREAM VAGINAL
Qty: 1 | Refills: 5 | Status: COMPLETED | COMMUNITY
Start: 2021-07-27 | End: 2023-11-04

## 2023-11-04 RX ORDER — HYDROMORPHONE HYDROCHLORIDE 4 MG/1
4 TABLET ORAL
Qty: 28 | Refills: 0 | Status: COMPLETED | COMMUNITY
Start: 2023-07-17 | End: 2023-11-04

## 2023-11-04 RX ORDER — METFORMIN HYDROCHLORIDE 1000 MG/1
1000 TABLET, COATED ORAL
Qty: 180 | Refills: 0 | Status: COMPLETED | COMMUNITY
Start: 2023-09-13 | End: 2023-11-04

## 2023-11-04 RX ORDER — NEBULIZER ACCESSORIES
KIT MISCELLANEOUS
Qty: 1 | Refills: 0 | Status: COMPLETED | COMMUNITY
Start: 2021-02-19 | End: 2023-11-04

## 2023-11-04 RX ORDER — MIRABEGRON 50 MG/1
50 TABLET, FILM COATED, EXTENDED RELEASE ORAL
Qty: 90 | Refills: 1 | Status: COMPLETED | COMMUNITY
Start: 2022-02-04 | End: 2023-11-04

## 2023-11-04 RX ORDER — GABAPENTIN 100 MG/1
100 CAPSULE ORAL
Qty: 270 | Refills: 0 | Status: DISCONTINUED | COMMUNITY
Start: 2023-02-13 | End: 2023-11-04

## 2023-11-14 RX ORDER — METOPROLOL SUCCINATE 25 MG/1
25 TABLET, EXTENDED RELEASE ORAL
Qty: 90 | Refills: 2 | Status: ACTIVE | COMMUNITY
Start: 2023-11-14

## 2023-11-27 ENCOUNTER — RX RENEWAL (OUTPATIENT)
Age: 68
End: 2023-11-27

## 2023-11-27 RX ORDER — PEN NEEDLE, DIABETIC 29 G X1/2"
31G X 5 MM NEEDLE, DISPOSABLE MISCELLANEOUS
Qty: 100 | Refills: 1 | Status: ACTIVE | COMMUNITY
Start: 2022-03-29 | End: 1900-01-01

## 2023-11-29 ENCOUNTER — APPOINTMENT (OUTPATIENT)
Dept: CARDIOLOGY | Facility: CLINIC | Age: 68
End: 2023-11-29

## 2023-12-06 ENCOUNTER — APPOINTMENT (OUTPATIENT)
Dept: FAMILY MEDICINE | Facility: CLINIC | Age: 68
End: 2023-12-06

## 2023-12-08 ENCOUNTER — RX RENEWAL (OUTPATIENT)
Age: 68
End: 2023-12-08

## 2023-12-08 ENCOUNTER — APPOINTMENT (OUTPATIENT)
Dept: FAMILY MEDICINE | Facility: CLINIC | Age: 68
End: 2023-12-08
Payer: MEDICARE

## 2023-12-08 VITALS
HEART RATE: 80 BPM | HEIGHT: 64 IN | SYSTOLIC BLOOD PRESSURE: 120 MMHG | OXYGEN SATURATION: 97 % | BODY MASS INDEX: 37.9 KG/M2 | WEIGHT: 222 LBS | TEMPERATURE: 96.2 F | DIASTOLIC BLOOD PRESSURE: 72 MMHG

## 2023-12-08 VITALS — SYSTOLIC BLOOD PRESSURE: 98 MMHG | DIASTOLIC BLOOD PRESSURE: 68 MMHG

## 2023-12-08 DIAGNOSIS — Z01.818 ENCOUNTER FOR OTHER PREPROCEDURAL EXAMINATION: ICD-10-CM

## 2023-12-08 PROCEDURE — 99214 OFFICE O/P EST MOD 30 MIN: CPT

## 2023-12-08 RX ORDER — DULOXETINE HYDROCHLORIDE 60 MG/1
60 CAPSULE, DELAYED RELEASE PELLETS ORAL
Qty: 90 | Refills: 1 | Status: COMPLETED | COMMUNITY
Start: 2023-02-13 | End: 2023-12-08

## 2023-12-08 RX ORDER — INSULIN LISPRO 100 [IU]/ML
100 INJECTION, SOLUTION INTRAVENOUS; SUBCUTANEOUS
Qty: 30 | Refills: 0 | Status: ACTIVE | COMMUNITY
Start: 2023-12-08 | End: 1900-01-01

## 2023-12-11 ENCOUNTER — APPOINTMENT (OUTPATIENT)
Dept: FAMILY MEDICINE | Facility: CLINIC | Age: 68
End: 2023-12-11
Payer: MEDICARE

## 2023-12-11 VITALS — SYSTOLIC BLOOD PRESSURE: 110 MMHG | DIASTOLIC BLOOD PRESSURE: 70 MMHG

## 2023-12-11 VITALS
WEIGHT: 228 LBS | TEMPERATURE: 97.8 F | BODY MASS INDEX: 38.93 KG/M2 | HEIGHT: 64 IN | OXYGEN SATURATION: 99 % | DIASTOLIC BLOOD PRESSURE: 62 MMHG | HEART RATE: 93 BPM | SYSTOLIC BLOOD PRESSURE: 100 MMHG

## 2023-12-11 DIAGNOSIS — R60.0 LOCALIZED EDEMA: ICD-10-CM

## 2023-12-11 DIAGNOSIS — I95.9 HYPOTENSION, UNSPECIFIED: ICD-10-CM

## 2023-12-11 PROCEDURE — 99213 OFFICE O/P EST LOW 20 MIN: CPT

## 2023-12-12 ENCOUNTER — APPOINTMENT (OUTPATIENT)
Dept: CARDIOLOGY | Facility: CLINIC | Age: 68
End: 2023-12-12

## 2024-01-10 ENCOUNTER — APPOINTMENT (OUTPATIENT)
Dept: DERMATOLOGY | Facility: CLINIC | Age: 69
End: 2024-01-10
Payer: MEDICARE

## 2024-01-10 PROCEDURE — 99214 OFFICE O/P EST MOD 30 MIN: CPT

## 2024-01-10 RX ORDER — SULFAMETHOXAZOLE AND TRIMETHOPRIM 400; 80 MG/1; MG/1
400-80 TABLET ORAL DAILY
Qty: 90 | Refills: 1 | Status: DISCONTINUED | COMMUNITY
Start: 2022-02-08 | End: 2024-01-10

## 2024-01-10 RX ORDER — HYDROCORTISONE 25 MG/G
2.5 OINTMENT TOPICAL
Qty: 1 | Refills: 3 | Status: ACTIVE | COMMUNITY
Start: 2024-01-10 | End: 1900-01-01

## 2024-01-10 RX ORDER — MOMETASONE FUROATE 1 MG/ML
0.1 SOLUTION TOPICAL
Qty: 1 | Refills: 5 | Status: ACTIVE | COMMUNITY
Start: 2024-01-10 | End: 1900-01-01

## 2024-01-10 NOTE — HISTORY OF PRESENT ILLNESS
[FreeTextEntry1] : Itching of scalp [de-identified] : Unscheduled follow-up visit for 67-year-old white female last seen by me on September 27, 2023, with a 3-year history of marked hair loss and dystrophy of the nails. Previously diagnosed as having a telogen effluvium and Beau'a lines -most probably secondary to prior COVID-19 infection and pneumonia which occurred in January and February 2021.  Currently treated with: Continue minoxidil 2.5 mg p.o. at night  Patient complains of a 3-month history of marked itching of the scalp. Patient also complains of itching of the eyebrows.  No previous treatment.  Note: Patient is status post a right cochlear implant done under general anesthesia on December 13, 2023. Has also had general anesthesia for other procedures and is scheduled for 2 more surgeries for treatment of kidney stones.

## 2024-01-10 NOTE — PHYSICAL EXAM
[Alert] : alert [Oriented x 3] : ~L oriented x 3 [Well Nourished] : well nourished [FreeTextEntry3] : Scalp: No rash seen Eyebrows: No rash seen

## 2024-01-10 NOTE — PLAN
[TextEntry] : Start mometasone solution 0.1% to scalp 2-3 times a day as needed Start 2-1/2% hydrocortisone ointment to eyebrows 2-3 times a day as needed  Patient to clear with neurologist Start gabapentin 100 mg p.o. at night for 3 days If tolerated, increase to 100 mg p.o. twice daily If tolerated increase to 100 mg p.o. 3 times daily  Return 3 weeks  CARLA [employer] student, served as chaperone and was present for the entire skin exam.

## 2024-01-12 ENCOUNTER — APPOINTMENT (OUTPATIENT)
Dept: CARDIOLOGY | Facility: CLINIC | Age: 69
End: 2024-01-12
Payer: MEDICARE

## 2024-01-12 VITALS
HEART RATE: 79 BPM | DIASTOLIC BLOOD PRESSURE: 60 MMHG | BODY MASS INDEX: 38.41 KG/M2 | HEIGHT: 64 IN | SYSTOLIC BLOOD PRESSURE: 98 MMHG | WEIGHT: 225 LBS | OXYGEN SATURATION: 98 %

## 2024-01-12 PROCEDURE — 99214 OFFICE O/P EST MOD 30 MIN: CPT | Mod: 25

## 2024-01-12 PROCEDURE — 93000 ELECTROCARDIOGRAM COMPLETE: CPT | Mod: NC

## 2024-01-12 NOTE — HISTORY OF PRESENT ILLNESS
[FreeTextEntry1] : Pt is a 69 y/o F PMH PVCs, DM, HLD, hypothyroidism, asthma.  COVID+ 01/2021 hospitalized for 1 mnth at [facility] 08/2022 admitted for urosepsis with complicated course - prolonged intubation, DVT/PE, toe amputation required.  She is now on Eliquis She had to go back to the hospital ([facility]) 02/2023 for 24hrs because she hit her head.   She fell 07/2023 and broke humerus  Pt is scheduled for renal stone procedure 02/13/2024 with Dr Shelley.  She notes occasional CP when she is under stress which is unchanged.  She denies SOB, diaphoresis, palpitations, dizziness, syncope, LE edema, PND, orthopnea.   TTE 04/2018 normal LV function, mild DD, mild PI TTE 04/2021 normal LV function without significant valvular pathology TTE 01/2023 EF 67%, mild MR/TR/PI Nuclear stress test 09/2020 breast and diaphragm attenuation.  Small, mild defect in anterior wall that is reversible suggestive of mild ischemia, EF 66% CCTA 06/2022 Ca score = 0, LAD 10% Nuc stress test 11/2023 normal myocardial perfusion jarrett 02/2022 showed PVCs 4.6% jarrett 02/2023 NSR without significant ectopy (PVCs <1%) jarrett 10/2023 NSR PVCs 12.5%  PMH: DM, hypothyroidism, asthma, nephrolithiasis, gastric bypass, Crohns hematologist - Dr Eduardo Smoking status: quit 16 yrs ago Current exercise: none Daily water intake: 32 oz Daily caffeine intake: 1 cup coffee OTC medications: see list Family hx: mother and father stroke, mother PPM, father ?MI at age 70 Previous hospitalizations: knee and back surgery

## 2024-01-12 NOTE — DISCUSSION/SUMMARY
[EKG obtained to assist in diagnosis and management of assessed problem(s)] : EKG obtained to assist in diagnosis and management of assessed problem(s) [FreeTextEntry1] : Pt is a 69 y/o F PMH PVCs, DM, HLD, hypothyroidism, asthma.  COVID+ 01/2021 hospitalized for 1 mnth at [facility] 08/2022 admitted for urosepsis with complicated course - prolonged intubation, DVT/PE, toe amputation required.  She is now on Eliquis She had to go back to the hospital ([facility]) 02/2023 for 24hrs because she hit her head.   She fell 07/2023 and broke humerus  Pt is scheduled for renal stone procedure 02/13/2024 with Dr Shelley.  Todays ECG shows NSR with PVCs - unchanged At this time the pt has no signs or symptoms of active ischemia, heart failure, life-threatening arrhythmias, severe valvular pathology. VSS There are no cardiac contraindications for planned procedure.   TTE 04/2018 normal LV function, mild DD, mild PI TTE 04/2021 normal LV function without significant valvular pathology TTE 01/2023 EF 67%, mild MR/TR/PI Nuclear stress test 09/2020 breast and diaphragm attenuation.  Small, mild defect in anterior wall that is reversible suggestive of mild ischemia, EF 66% CCTA 06/2022 Ca score = 0, LAD 10% Nuc stress test 11/2023 normal myocardial perfusion jarrett 02/2022 showed PVCs 4.6% jarrett 02/2023 NSR without significant ectopy (PVCs <1%) jarrett 10/2023 NSR PVCs 12.5%  LE edema: c/w lasix 20mg qd ADvised low salt diet, weight loss, regular exercise  hypotension: BP very stable - now off midodrine monitor closely  Pt was on amiodarone after prolonged hospitalization I am assuming she had parox AF at the time Her jarrett had not shown any arrhythmias She is now off amio PVCs - now on metoprolol  DM: follows with PCP c/w current meds goal A1c <7 Advised lifestyle modifications  VSS  HLD: c/w statin goal LDL <70 Advised lifestyle modifications   DVT/PE: on Eliquis following with hem  toe amputation: refer to vascular  The described plan was discussed with the pt.  All questions and concerns were addressed to the best of my knowledge.

## 2024-01-12 NOTE — PHYSICAL EXAM
[Well Developed] : well developed [Well Nourished] : well nourished [No Acute Distress] : no acute distress [Normal Conjunctiva] : normal conjunctiva [Normal Venous Pressure] : normal venous pressure [No Carotid Bruit] : no carotid bruit [Normal S1, S2] : normal S1, S2 [No Murmur] : no murmur [No Rub] : no rub [No Gallop] : no gallop [Clear Lung Fields] : clear lung fields [Good Air Entry] : good air entry [No Respiratory Distress] : no respiratory distress  [Soft] : abdomen soft [Non Tender] : non-tender [No Masses/organomegaly] : no masses/organomegaly [Normal Bowel Sounds] : normal bowel sounds [No Edema] : no edema [No Cyanosis] : no cyanosis [No Clubbing] : no clubbing [No Varicosities] : no varicosities [No Rash] : no rash [No Skin Lesions] : no skin lesions [Moves all extremities] : moves all extremities [No Focal Deficits] : no focal deficits [Normal Speech] : normal speech [Alert and Oriented] : alert and oriented [Normal memory] : normal memory [de-identified] : premature beats

## 2024-01-12 NOTE — REASON FOR VISIT
[CV Risk Factors and Non-Cardiac Disease] : CV risk factors and non-cardiac disease [Arrhythmia/ECG Abnorrmalities] : arrhythmia/ECG abnormalities [Other: ____] : [unfilled]

## 2024-01-29 ENCOUNTER — RX RENEWAL (OUTPATIENT)
Age: 69
End: 2024-01-29

## 2024-02-01 ENCOUNTER — NON-APPOINTMENT (OUTPATIENT)
Age: 69
End: 2024-02-01

## 2024-02-02 ENCOUNTER — APPOINTMENT (OUTPATIENT)
Dept: FAMILY MEDICINE | Facility: CLINIC | Age: 69
End: 2024-02-02
Payer: MEDICARE

## 2024-02-02 ENCOUNTER — LABORATORY RESULT (OUTPATIENT)
Age: 69
End: 2024-02-02

## 2024-02-02 VITALS
SYSTOLIC BLOOD PRESSURE: 100 MMHG | HEART RATE: 83 BPM | DIASTOLIC BLOOD PRESSURE: 60 MMHG | RESPIRATION RATE: 16 BRPM | WEIGHT: 236 LBS | BODY MASS INDEX: 40.29 KG/M2 | OXYGEN SATURATION: 97 % | HEIGHT: 64 IN

## 2024-02-02 DIAGNOSIS — N20.0 CALCULUS OF KIDNEY: ICD-10-CM

## 2024-02-02 PROCEDURE — 99214 OFFICE O/P EST MOD 30 MIN: CPT

## 2024-02-02 RX ORDER — FUROSEMIDE 20 MG/1
20 TABLET ORAL DAILY
Qty: 135 | Refills: 0 | Status: ACTIVE | COMMUNITY
Start: 2023-01-05 | End: 1900-01-01

## 2024-02-05 ENCOUNTER — RX RENEWAL (OUTPATIENT)
Age: 69
End: 2024-02-05

## 2024-02-05 LAB
BASOPHILS # BLD AUTO: 0.06 K/UL
BASOPHILS NFR BLD AUTO: 0.9 %
EOSINOPHIL # BLD AUTO: 0.09 K/UL
EOSINOPHIL NFR BLD AUTO: 1.3 %
ESTIMATED AVERAGE GLUCOSE: 108 MG/DL
HBA1C MFR BLD HPLC: 5.4 %
HCT VFR BLD CALC: 33.2 %
HGB BLD-MCNC: 9.8 G/DL
IMM GRANULOCYTES NFR BLD AUTO: 1 %
LYMPHOCYTES # BLD AUTO: 1.49 K/UL
LYMPHOCYTES NFR BLD AUTO: 22.2 %
MAN DIFF?: NORMAL
MCHC RBC-ENTMCNC: 29.5 GM/DL
MCHC RBC-ENTMCNC: 31.5 PG
MCV RBC AUTO: 106.8 FL
MONOCYTES # BLD AUTO: 0.56 K/UL
MONOCYTES NFR BLD AUTO: 8.3 %
NEUTROPHILS # BLD AUTO: 4.44 K/UL
NEUTROPHILS NFR BLD AUTO: 66.3 %
PLATELET # BLD AUTO: 154 K/UL
RBC # BLD: 3.11 M/UL
RBC # FLD: 16.1 %
WBC # FLD AUTO: 6.71 K/UL

## 2024-02-05 NOTE — PHYSICAL EXAM
[Soft] : abdomen soft [Normal] : affect was normal and insight and judgment were intact [de-identified] : +abdominal tenderness

## 2024-02-05 NOTE — HISTORY OF PRESENT ILLNESS
[Atrial Fibrillation] : atrial fibrillation [Asthma] : asthma [No Adverse Anesthesia Reaction] : no adverse anesthesia reaction in self or family member [Chronic Anticoagulation] : chronic anticoagulation [Diabetes] : diabetes [(Patient denies any chest pain, claudication, dyspnea on exertion, orthopnea, palpitations or syncope)] : Patient denies any chest pain, claudication, dyspnea on exertion, orthopnea, palpitations or syncope [Aortic Stenosis] : no aortic stenosis [Smoker] : not a smoker [Chronic Kidney Disease] : no chronic kidney disease [FreeTextEntry1] : cysto, L RGP, ureteroscopy, laser lithotripsy, stone manipulation, ureteral stent placement [FreeTextEntry2] : 2/13/24 [FreeTextEntry3] : Leeanan Collins [FreeTextEntry4] : Pt in office for medical clearance. Pt to go for procedure for L renal stones.  Pt denies chest pain, palpitations, dyspnea, fever, chills, nausea, or vomiting. Pt has hx of DM, hypothyroidism, asthma, nephrolithiasis, gastric bypass, Crohns, anemia, seizure d/o, PAF, asthma, PE, depression, frequent falls.  DM pt seeing endo Dr. Borrero. Pt on metformin 1000mg bid, januvia was d/c'ed, started on mounjaro 2.5mg weekly. Pt on lantus 50 units qhs and humalog 20 units premeals. Glucose has been 180+ recently as she has not started mounjaro yet.  Pt to make appt w/ GI to follow up on Crohn's. Pt has hx of anemia after Crohn's flareup, pt has been f/u with hematology, last hb 9.7 on 1/29/24 Pt found to have positive urine cx, pt was started on bactrim x 7 days

## 2024-02-05 NOTE — ASSESSMENT
[FreeTextEntry4] : Pt is medically optimized for procedure at this time pending repeat cbc. Hold eliquis 3 days prior to procedure. Pt has hx of Crohn's, recently recovered from exacerbation and has completed prednisone course. pt to f/u with colorectal specialist.  Pt has diabetes pt seeing endo. Pt to restart januvia while awaiting approval for mounjaro. D/C januvia once she starts mounjaro. Monitor glucose and BP perioperatively. Anemia stable, monitor cbc periop. Repeat hb 9.8 Pt has hx asthma monitor for bronchospasm. Refill singulair Pt with hx of PAF, PE. Pt currently on eliquis bid. Pt to hold eliquis x 3 days prior to procedure. Pt to restart medication after surgery.  Fax to [facility] preop   2/5/24 No significant contraindication to procedure at this time.

## 2024-02-05 NOTE — RESULTS/DATA
[] : results reviewed [de-identified] : Hb 10.1 [de-identified] : EKG- NSR 72 bpm no st t changes  [de-identified] : hba1c 5.4 repeat hb 9.8

## 2024-02-08 LAB — BACTERIA UR CULT: ABNORMAL

## 2024-02-08 RX ORDER — CIPROFLOXACIN HYDROCHLORIDE 500 MG/1
500 TABLET, FILM COATED ORAL
Qty: 10 | Refills: 0 | Status: ACTIVE | COMMUNITY
Start: 2024-02-08 | End: 1900-01-01

## 2024-02-28 ENCOUNTER — APPOINTMENT (OUTPATIENT)
Dept: DERMATOLOGY | Facility: CLINIC | Age: 69
End: 2024-02-28

## 2024-03-01 ENCOUNTER — APPOINTMENT (OUTPATIENT)
Dept: NEUROLOGY | Facility: CLINIC | Age: 69
End: 2024-03-01
Payer: MEDICARE

## 2024-03-01 VITALS
HEART RATE: 80 BPM | WEIGHT: 230 LBS | DIASTOLIC BLOOD PRESSURE: 66 MMHG | HEIGHT: 64 IN | TEMPERATURE: 98.2 F | SYSTOLIC BLOOD PRESSURE: 106 MMHG | BODY MASS INDEX: 39.27 KG/M2

## 2024-03-01 DIAGNOSIS — F39 UNSPECIFIED MOOD [AFFECTIVE] DISORDER: ICD-10-CM

## 2024-03-01 DIAGNOSIS — R41.3 OTHER AMNESIA: ICD-10-CM

## 2024-03-01 DIAGNOSIS — R56.9 UNSPECIFIED CONVULSIONS: ICD-10-CM

## 2024-03-01 PROCEDURE — 99214 OFFICE O/P EST MOD 30 MIN: CPT

## 2024-03-01 PROCEDURE — G2211 COMPLEX E/M VISIT ADD ON: CPT

## 2024-03-01 NOTE — PHYSICAL EXAM
[Over the Past 2 Weeks, Have You Felt Down, Depressed, or Hopeless?] : 1.) Over the past 2 weeks, have you felt down, depressed, or hopeless? Yes [FreeTextEntry1] : Examination: Constitutional: normal, no apparent distress Eyes: normal conjunctiva b/l, no ptosis, visual fields full Respiratory: no respiratory distress, normal effort, normal auscultation Cardiovascular: normal rate, rhythm, no murmurs Neck: supple, no masses Vascular: carotids normal Skin: normal color, no rashes Psych: normal mood, affect  Neurological: Memory:  oriented to person, place, time Language intact/no aphasia Cranial Nerves: + hearing impairment, Pupils equally round and reactive to light, ocular muscles/movements intact, no ptosis, no facial weakness, tongue protrudes normally in the midline,  Motor: normal tone, no pronator drift, full strength in upper extremities. Weakness/pain in right hip flexion Coordination: Fine motor movements intact, rapid alternating movements intact, finger to nose intact bilaterally Sensory: intact to light touch DTRs: hypoactive Gait: wide based, slow [Over the Past 2 Weeks, Have You Felt Little Interest or Pleasure Doing Things?] : 2.) Over the past 2 weeks, have you felt little interest or pleasure doing things? No

## 2024-03-01 NOTE — HISTORY OF PRESENT ILLNESS
[FreeTextEntry1] : 4/1/22: She fell on 3/26. She states she was going up a step, lost balance and fell on her chest. She had two other falls since the last visit. Sometimes she has dizziness. She does not believe there is any LOC. Legs feel weak.  Back pain persists.  She takes cyclobenzaprine at bedtime.   DEXA scan showed osteoporosis. She was started on Prolia.  She continues to report memory loss.  She does lose time.   She is having difficulty with blood sugar control.   9/2/23: In August 2022 she was hospitalized at [facility].  She developed sepsis. She reports that she was in a coma.  She developed gangrene. The left 2nd, 4th and 5th toes and R 2nd-5th toes were amputated. She had multiple admissions for anemia.  She also developed a PE and was started on Eliquis. She also had afib.  She lost hearing in both ears.   She was seen in the ED on 7/9/23 after a fall.  Since she lost her toes she has had more difficulty walking. She reports frequent falling.  She has not had recent physical therapy.  She says that sometimes she "freezes". She says that this happens when she is going to take medication, if something is upsetting or if many people are talking.  In terms of her hearing, she states that she has been approved for implants which will be done at [facility].   She reports that she began having tremors when she got out of a coma.   She gave me a medication list with approximately 30 medications.  She is taking multiple sedating medications including oxcarbazepine, gabapentin, cyclobenzaprine, amitriptyline, duloxetine, Trazodone, alprazolam.   She also reports that her lips have been numb since her hospitalization.   3/1/24: She had an implant for her right ear. She says that it is not working and she has minimal hearing. She says that memory is slowly coming back. She says that it is not coming back fast enough and she feels depressed about this. She continues to bite her lower lip. It has felt numb since she was in a coma.  She is wobbly on her feet. She is going to PT. Her back is worse as well. She has hard with walking long distances.   Since the last visit, she has discontinued cyclobenzaprine, amitriptyline, duloxetine and Trazodone.   Sleep is fragmented.   She has not had any episodes with long gaps in memory suspicious for seizures.  She scored 15 on the PHQ-9, consistent with moderately severe depression.

## 2024-03-01 NOTE — DATA REVIEWED
[de-identified] : MRI brain without contrast 1/8/20:  No evidence of acute infarction, hemorrhage or hydrocephalus. Minimal non-acute small vessel ischemic change.  MRI brain with and without contrast and seizure protocol 9/14/20:  No hydrocephalus, mass effect, acute intracranial hemorrhage, vasogenic edema, or acute territorial infarct.  Tiny nonspecific focus of increased T2 and FLAIR hyperintense signal involving the superior right insular cortex (6-20). This could represent a small chronic infarct. Cortical dysplasia is considered much less likely.  No abnormal parenchymal or leptomeningeal enhancement.  No evidence for mesial temporal sclerosis or gray matter heterotopia.  MRI brain and cervical spine 8/11/21:  BRAIN: No acute intracranial findings.  CERVICAL SPINE: No acute cord abnormalities. Multilevel spondylosis, not significantly changed from 6/13/2019.   MRI brain 10/11/23: No evidence for intracranial mass, acute territorial infarct, acute intracranial hemorrhage, or midline shift. [de-identified] : Routine EEG 8/19/20:  This was an abnormal EEG study in the awake and drowsy states due to the presence of: -Bilateral independent temporal sharp waves, left more frequent than right, with phase reversal at F7 and F8.  EEG Impression/Clinical Correlate: The findings are consistent with a risk for focal onset seizures.  Consider a repeat study if clinically indicated.   24 hour EEG 8/19/20-8/20/20:  EEG Summary/Classification: This was an abnormal EEG study in the awake, drowsy, and asleep states due to the presence of bilateral independent temporal sharp waves with phase reversal at F7 and F8, seen more frequently on the left.  EEG Impression/Clinical Correlate: The findings are suggestive of an risk for focal onset seizures.   EEG 3/10/21: B/L Independent temporal sharp waves  EEG 10/5/23: Normal EEG 10/5/23-10/6/23:  Borderline study due to: -Left temporal sharp waves in drowsiness and sleep. Most appear to be wickets, a normal variant. Others are more suspicious.  [de-identified] : Blood tests 6/22/20: TSH 3.1, vitamin D 27.4 Blood tests 1/8/20: B12 667 HbA1C 7.6  MRI lumbar spine 1/12/2022: Status post posterior instrumented fusion at L3-L4 with discectomy and interbody fusion. Posterior decompression at L3-L4, L4-L5, and L5-S1. Grade 2 anterolisthesis of L5 on S1.  Multilevel degenerative changes throughout the lumbar spine, as detailed above, worst at L2-L3, L4-L5, and L5-S1.  DEXA scan 2/22/22: Osteopporosis.  MRI cervical spine 1/11/24: No compression deformity or spondylolisthesis involving the cervical spine vertebra. Reversal of the cervical lordosis.  At C4-C5, there is a disc bulge with bilateral uncovertebral joint hypertrophy resulting in moderate central spinal stenosis with mild bilateral neuroforaminal narrowing. Impingement upon the ventral roots of the bilateral C5 nerves at this level cannot be excluded.   At C5-C6 there is a disc bulge with a bilateral uncovertebral joint hypertrophy along with a shallow left paracentral disc herniation resulting in moderate central spinal stenosis with mild right and moderate left neuroforaminal narrowing. There is apparent impingement upon the ventral roots of bilateral C6 nerves.  At  C6-C7 there is a disc osteophyte complex with bilateral uncovertebral joint hypertrophy (left greater than right) resulting in mild central spinal stenosis with mild right and moderate left neuroforaminal narrowing. Nerve root impingement this level cannot be excluded.   MRI lumbar spine 1/11/2024: Postoperative changes consistent with a discectomy and posterior fusion of the lumbar spine and L3-L4 with evidence of laminectomies involving the L5 vertebrae.  There is susceptibility artifact created by the surgical hardware. Mild disc bulge at L2-L3 resulting in mild central spinal stenosis.  Grade 2 anterolisthesis of L5 on S1 of approximately 9 to 10 mm which is progressed when compared to previous study.  There is mild left and severe right neuroforaminal narrowing at L5-S1, stable when compared to previous study with impingement upon the exiting right L5 nerve root.

## 2024-03-01 NOTE — DISCUSSION/SUMMARY
[FreeTextEntry1] : Ms. García is a 68 year old woman who initially presented for evaluation of frequent falls. Since her last visit she has had multiple medical issues.  Frequent Falls -No recent falls.  -Multifactorial - neuropathy, toe amputations, hearing impairment.  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 Continue to work on blood sugar control.  Possible seizures -The freezing episodes that she reports recently do not seem to be seizures. She has no loss of awareness. -EEG showed some b/l temporal sharp waves. Most recent 24 hour EEG in October 2023 showed left temporal sharp waves, most appearing to be wickets, others more suspicious. -Currently on oxcarbazepine 300 mg BID   Polypharmacy -She has successfully eliminated multiple medications. I will be adding duloxetine back.  Depression: -She reports depression. PHQ-9 score is 15. She does not have suicidal ideation. -Since she has both pain and depression, I will restart duloxetine 30 mg hs. She should call if she has any worsening of symptoms. -I am referring her to Dr. Garza for continued treatment.  Memory Impairment -She previously scored 21/30 on the Red Cognitive Assessment initially with delayed recall being the area of worst performance. -She had worsening since covid with brain fog. -Depression may also now be a significant factor. -No new pathology on repeat MRI brain. -Participate in mentally stimulating activities.   f/u 3-4 months

## 2024-03-14 ENCOUNTER — APPOINTMENT (OUTPATIENT)
Dept: FAMILY MEDICINE | Facility: CLINIC | Age: 69
End: 2024-03-14
Payer: MEDICARE

## 2024-03-14 VITALS
HEART RATE: 86 BPM | WEIGHT: 214 LBS | HEIGHT: 64 IN | RESPIRATION RATE: 14 BRPM | SYSTOLIC BLOOD PRESSURE: 92 MMHG | DIASTOLIC BLOOD PRESSURE: 64 MMHG | TEMPERATURE: 97.5 F | BODY MASS INDEX: 36.54 KG/M2 | OXYGEN SATURATION: 97 %

## 2024-03-14 DIAGNOSIS — R26.89 OTHER ABNORMALITIES OF GAIT AND MOBILITY: ICD-10-CM

## 2024-03-14 DIAGNOSIS — E03.9 HYPOTHYROIDISM, UNSPECIFIED: ICD-10-CM

## 2024-03-14 PROCEDURE — G2211 COMPLEX E/M VISIT ADD ON: CPT

## 2024-03-14 PROCEDURE — 99214 OFFICE O/P EST MOD 30 MIN: CPT

## 2024-03-14 RX ORDER — POTASSIUM CITRATE 15 MEQ/1
15 MEQ TABLET, EXTENDED RELEASE ORAL
Qty: 90 | Refills: 1 | Status: ACTIVE | COMMUNITY
Start: 2023-11-04 | End: 1900-01-01

## 2024-03-14 RX ORDER — ATORVASTATIN CALCIUM 20 MG/1
20 TABLET, FILM COATED ORAL
Qty: 90 | Refills: 1 | Status: ACTIVE | COMMUNITY
Start: 2022-02-22 | End: 1900-01-01

## 2024-03-15 NOTE — ASSESSMENT
[FreeTextEntry1] : unsteady gait - restart PT to improve balance/strength depression - refill duloxetine. pt to f/u with psych for eval DM - check a1c. f/u endo hypothyroidism - cont levothyroxine, check TFTs HLD - refill statin, check flp, hepatic function pruritis - refill gabapentin

## 2024-03-15 NOTE — PHYSICAL EXAM
[Normal] : normal rate, regular rhythm, normal S1 and S2 and no murmur heard [de-identified] : unstable gait

## 2024-03-15 NOTE — HISTORY OF PRESENT ILLNESS
[de-identified] : Pt has hx of DM, hypothyroidism, asthma, nephrolithiasis, gastric bypass, Crohns, anemia, seizure d/o, PAF, asthma, PE, depression, frequent falls. Pt recently saw neuro, mood has worsened recently, pt was referred to psych Dr. Garza. Pt was started on duloxetine 30mg qhs.. Pt had renal stone removed in 02/2024, pt to f/u with urologist. DM pt seeing endo Dr. Borrero. Pt on metformin 1000mg bid, januvia was d/c'ed, started on mounjaro 2.5mg weekly. Pt on lantus 50 units qhs and humalog decreased to 15 units premeals. Glucose improving. Started mounjaro, tolerating well.  Pt to make appt w/ GI to follow up on Crohn's. Pt has hx of anemia after Crohn's flareup, pt has been f/u with hematology, last hb 9.7 on 1/29/24

## 2024-03-18 ENCOUNTER — OUTPATIENT (OUTPATIENT)
Dept: OUTPATIENT SERVICES | Facility: HOSPITAL | Age: 69
LOS: 1 days | End: 2024-03-18
Payer: MEDICARE

## 2024-03-18 ENCOUNTER — APPOINTMENT (OUTPATIENT)
Dept: RADIOLOGY | Facility: CLINIC | Age: 69
End: 2024-03-18
Payer: MEDICARE

## 2024-03-18 DIAGNOSIS — Z98.89 OTHER SPECIFIED POSTPROCEDURAL STATES: Chronic | ICD-10-CM

## 2024-03-18 DIAGNOSIS — L02.92 FURUNCLE, UNSPECIFIED: Chronic | ICD-10-CM

## 2024-03-18 DIAGNOSIS — M75.120 COMPLETE ROTATOR CUFF TEAR OR RUPTURE OF UNSPECIFIED SHOULDER, NOT SPECIFIED AS TRAUMATIC: Chronic | ICD-10-CM

## 2024-03-18 DIAGNOSIS — Z00.8 ENCOUNTER FOR OTHER GENERAL EXAMINATION: ICD-10-CM

## 2024-03-18 DIAGNOSIS — Z90.49 ACQUIRED ABSENCE OF OTHER SPECIFIED PARTS OF DIGESTIVE TRACT: Chronic | ICD-10-CM

## 2024-03-18 PROCEDURE — 72040 X-RAY EXAM NECK SPINE 2-3 VW: CPT

## 2024-03-18 PROCEDURE — 72040 X-RAY EXAM NECK SPINE 2-3 VW: CPT | Mod: 26

## 2024-03-22 ENCOUNTER — APPOINTMENT (OUTPATIENT)
Dept: COLORECTAL SURGERY | Facility: CLINIC | Age: 69
End: 2024-03-22
Payer: MEDICARE

## 2024-03-22 VITALS
SYSTOLIC BLOOD PRESSURE: 127 MMHG | WEIGHT: 209 LBS | BODY MASS INDEX: 35.68 KG/M2 | HEIGHT: 64 IN | HEART RATE: 92 BPM | DIASTOLIC BLOOD PRESSURE: 77 MMHG

## 2024-03-22 DIAGNOSIS — K92.2 GASTROINTESTINAL HEMORRHAGE, UNSPECIFIED: ICD-10-CM

## 2024-03-22 LAB
ALBUMIN SERPL ELPH-MCNC: 4.1 G/DL
ALP BLD-CCNC: 79 U/L
ALT SERPL-CCNC: 24 U/L
ANION GAP SERPL CALC-SCNC: 11 MMOL/L
AST SERPL-CCNC: 34 U/L
BILIRUB SERPL-MCNC: 0.5 MG/DL
BUN SERPL-MCNC: 18 MG/DL
CALCIUM SERPL-MCNC: 9.3 MG/DL
CHLORIDE SERPL-SCNC: 102 MMOL/L
CHOLEST SERPL-MCNC: 124 MG/DL
CO2 SERPL-SCNC: 29 MMOL/L
CREAT SERPL-MCNC: 0.69 MG/DL
EGFR: 96 ML/MIN/1.73M2
ESTIMATED AVERAGE GLUCOSE: 91 MG/DL
GLUCOSE SERPL-MCNC: 124 MG/DL
HBA1C MFR BLD HPLC: 4.8 %
HDLC SERPL-MCNC: 47 MG/DL
LDLC SERPL CALC-MCNC: 40 MG/DL
NONHDLC SERPL-MCNC: 77 MG/DL
POTASSIUM SERPL-SCNC: 4 MMOL/L
PROT SERPL-MCNC: 6.4 G/DL
SODIUM SERPL-SCNC: 142 MMOL/L
T4 FREE SERPL-MCNC: 1.2 NG/DL
TRIGL SERPL-MCNC: 244 MG/DL
TSH SERPL-ACNC: 3.78 UIU/ML

## 2024-03-22 PROCEDURE — 99214 OFFICE O/P EST MOD 30 MIN: CPT

## 2024-03-22 NOTE — PLAN
[TextEntry] : 68-year-old female with a history of inflammatory bowel disease with recurrent abdominal pain and bleeding.  Her diagnosis is currently Crohn's although I am not sure where she got that specific diagnosis.  On recent discharge from the hospital, she was diagnosed with ulcerative colitis.  Regardless she has inflammatory bowel disease that is not well-managed and despite my prompting in the past establish care with gastroenterology, she is yet to do so.  I have ordered a CT scan of her abdomen today to evaluate the presence of colitis and will treat accordingly.  Otherwise, I will facilitate her establishing care with GI which she needs

## 2024-03-22 NOTE — HISTORY OF PRESENT ILLNESS
[FreeTextEntry1] : 68 year old female with a history of Crohn's disease who presents for a follow up visit.  She complains of abdominal pain, loose bowel movements and bloody stools which is similar to symptoms she had 6 months ago.  I evaluated her with blood work which confirmed anemia with a hemoglobin of 10 as well as stool studies which were all negative.  I ordered a CT scan at the time which was not performed because soon after that encounter, she was hospitalized at [facility] for 2 weeks.  I do not have the reports from imaging from that hospitalization but I reviewed her discharge summary she was diagnosed with ulcerative colitis and discharged on a steroid taper and asked to follow-up with gastroenterology to establish and initiate biologic.  She has not followed up because [facility] does not take her insurance.   Her colonoscopy in 2021 had normal appearing mucosa although biopsies had mild chronic colitis with ascending colon showing moderate active colitis.  She has a more recent colonoscopy from [facility].

## 2024-03-22 NOTE — PHYSICAL EXAM
[Respiratory Effort] : normal respiratory effort [Calm] : calm [de-identified] : soft, non distended, tender across lower abdomen without rebound or guarding. Upper midline incision [de-identified] : well appearing, in no distress [de-identified] : normocephalic, atraumatic [de-identified] : moves all extremities [de-identified] : warm and dry [de-identified] : Alert and oriented

## 2024-03-27 ENCOUNTER — APPOINTMENT (OUTPATIENT)
Dept: DERMATOLOGY | Facility: CLINIC | Age: 69
End: 2024-03-27
Payer: MEDICARE

## 2024-03-27 PROCEDURE — 99213 OFFICE O/P EST LOW 20 MIN: CPT

## 2024-03-27 NOTE — PHYSICAL EXAM
[Alert] : alert [Well Nourished] : well nourished [Oriented x 3] : ~L oriented x 3 [FreeTextEntry3] : Scalp: No rash seen Mild diffuse thinning 0 hairs removed per gentle tug  Eyebrows: No rash seen

## 2024-03-27 NOTE — PLAN
[TextEntry] : Continue gabapentin 100 mg p.o. 3 times daily-patient does not want me to increase the dose at this time Continue minoxidil 2.5 mg p.o. at night Continue mometasone solution 0.1% to scalp 2-3 times a day as needed for itching Continue 2-1/2% hydrocortisone ointment to eyebrows once or twice a day as needed  Return 3 months  [place], medical assistant, served as chaperone and was present for the entire skin exam.

## 2024-03-27 NOTE — ASSESSMENT
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

## 2024-03-27 NOTE — HISTORY OF PRESENT ILLNESS
[FreeTextEntry1] : Itching of scalp and eyebrows [de-identified] : Follow-up visit for 68-year-old white female last seen by me on January 10, 2024, with a 3-year history of marked hair loss and dystrophy of the nails.  Previously diagnosed as having a telogen effluvium and Beau's lines-most likely secondary to prior COVID-19 infection and pneumonia which occurred in January and February 2021.  Patient also had a right cochlear implant done under general anesthesia in December, 2023.  Patient also had a kidney stone removed in January, 2024 under general anesthesia. Treated with: Continue minoxidil 2.5 mg p.o. at night.  At the last visit, patient complained of a 3-month history of marked itching of the scalp and eyebrows with no obvious rash seen.   Diagnosed as having pruritus possibly secondary neuropathic in origin.  Treated with: Gabapentin titrated up to 100 mg p.o. 3 times daily Start mometasone solution 0.1% to scalp 2-3 times a day as needed Start 2-1/2% hydrocortisone ointment to eyebrows 2-3 times a day as needed -using this once a day  Itching persists but is less.  Patient complains of marked fatigue

## 2024-04-03 ENCOUNTER — NON-APPOINTMENT (OUTPATIENT)
Age: 69
End: 2024-04-03

## 2024-04-03 ENCOUNTER — APPOINTMENT (OUTPATIENT)
Dept: CT IMAGING | Facility: CLINIC | Age: 69
End: 2024-04-03
Payer: MEDICARE

## 2024-04-03 ENCOUNTER — OUTPATIENT (OUTPATIENT)
Dept: OUTPATIENT SERVICES | Facility: HOSPITAL | Age: 69
LOS: 1 days | End: 2024-04-03
Payer: MEDICARE

## 2024-04-03 DIAGNOSIS — L02.92 FURUNCLE, UNSPECIFIED: Chronic | ICD-10-CM

## 2024-04-03 DIAGNOSIS — Z98.89 OTHER SPECIFIED POSTPROCEDURAL STATES: Chronic | ICD-10-CM

## 2024-04-03 DIAGNOSIS — K46.9 UNSPECIFIED ABDOMINAL HERNIA WITHOUT OBSTRUCTION OR GANGRENE: Chronic | ICD-10-CM

## 2024-04-03 DIAGNOSIS — K92.2 GASTROINTESTINAL HEMORRHAGE, UNSPECIFIED: ICD-10-CM

## 2024-04-03 DIAGNOSIS — Z98.84 BARIATRIC SURGERY STATUS: Chronic | ICD-10-CM

## 2024-04-03 DIAGNOSIS — Z90.49 ACQUIRED ABSENCE OF OTHER SPECIFIED PARTS OF DIGESTIVE TRACT: Chronic | ICD-10-CM

## 2024-04-03 DIAGNOSIS — Z41.1 ENCOUNTER FOR COSMETIC SURGERY: Chronic | ICD-10-CM

## 2024-04-03 DIAGNOSIS — Z96.653 PRESENCE OF ARTIFICIAL KNEE JOINT, BILATERAL: Chronic | ICD-10-CM

## 2024-04-03 DIAGNOSIS — M75.120 COMPLETE ROTATOR CUFF TEAR OR RUPTURE OF UNSPECIFIED SHOULDER, NOT SPECIFIED AS TRAUMATIC: Chronic | ICD-10-CM

## 2024-04-03 PROCEDURE — 74177 CT ABD & PELVIS W/CONTRAST: CPT

## 2024-04-03 PROCEDURE — 74177 CT ABD & PELVIS W/CONTRAST: CPT | Mod: 26

## 2024-04-04 ENCOUNTER — RX RENEWAL (OUTPATIENT)
Age: 69
End: 2024-04-04

## 2024-04-05 ENCOUNTER — RX RENEWAL (OUTPATIENT)
Age: 69
End: 2024-04-05

## 2024-04-05 RX ORDER — DEXLANSOPRAZOLE 60 MG/1
60 CAPSULE, DELAYED RELEASE ORAL
Qty: 90 | Refills: 0 | Status: ACTIVE | COMMUNITY
Start: 2021-07-02 | End: 1900-01-01

## 2024-04-22 ENCOUNTER — RX RENEWAL (OUTPATIENT)
Age: 69
End: 2024-04-22

## 2024-04-29 ENCOUNTER — RX RENEWAL (OUTPATIENT)
Age: 69
End: 2024-04-29

## 2024-04-29 RX ORDER — MESALAMINE 400 MG/1
400 CAPSULE, DELAYED RELEASE ORAL
Qty: 270 | Refills: 0 | Status: ACTIVE | COMMUNITY
Start: 2022-03-29 | End: 1900-01-01

## 2024-05-07 ENCOUNTER — LABORATORY RESULT (OUTPATIENT)
Age: 69
End: 2024-05-07

## 2024-05-07 ENCOUNTER — APPOINTMENT (OUTPATIENT)
Dept: GASTROENTEROLOGY | Facility: CLINIC | Age: 69
End: 2024-05-07
Payer: MEDICARE

## 2024-05-07 VITALS
BODY MASS INDEX: 36.7 KG/M2 | SYSTOLIC BLOOD PRESSURE: 114 MMHG | HEIGHT: 64 IN | WEIGHT: 215 LBS | DIASTOLIC BLOOD PRESSURE: 76 MMHG

## 2024-05-07 DIAGNOSIS — K62.5 HEMORRHAGE OF ANUS AND RECTUM: ICD-10-CM

## 2024-05-07 PROCEDURE — 99204 OFFICE O/P NEW MOD 45 MIN: CPT

## 2024-05-07 RX ORDER — HYDROCORTISONE ACETATE 25 MG/1
25 SUPPOSITORY RECTAL
Qty: 7 | Refills: 2 | Status: ACTIVE | COMMUNITY
Start: 2024-05-07 | End: 1900-01-01

## 2024-05-07 NOTE — PHYSICAL EXAM
[Alert] : alert [Healthy Appearing] : healthy appearing [Sclera] : the sclera and conjunctiva were normal [Hearing Threshold Finger Rub Not [place]] : hearing was normal [Normal Appearance] : the appearance of the neck was normal [No Respiratory Distress] : no respiratory distress [Auscultation Breath Sounds / Voice Sounds] : lungs were clear to auscultation bilaterally [Heart Rate And Rhythm] : heart rate was normal and rhythm regular [Bowel Sounds] : normal bowel sounds [Abdomen Tenderness] : non-tender [Abdomen Soft] : soft [Abnormal Walk] : normal gait [Normal Color / Pigmentation] : normal skin color and pigmentation [Oriented To Time, Place, And Person] : oriented to person, place, and time

## 2024-05-07 NOTE — REVIEW OF SYSTEMS
[Abdominal Pain] : abdominal pain [Diarrhea] : diarrhea [Bleeding] : bleeding [Negative] : Heme/Lymph [Vomiting] : no vomiting [Constipation] : no constipation [Heartburn] : no heartburn [Melena (black stool)] : no melena [Fecal Incontinence (soiling)] : no fecal incontinence [Bloating (gassiness)] : no bloating

## 2024-05-07 NOTE — HISTORY OF PRESENT ILLNESS
[FreeTextEntry1] : Maria A García is a 68 year old female presenting today to establish care for IBD. Pt states she has been diagnosed with Crohn's for many years, takes mesalamine for maintenance. records are unavailable. She reports her most recent colonoscopy was during an admission at [facility] in 2022. Over the last few weeks, has had increased symptoms including diarrhea, frequency, rectal bleeding which is why she went to see Dr. Lind.

## 2024-05-07 NOTE — ASSESSMENT
[FreeTextEntry1] : Plan: Given previous records and extent of disease is unknown as well as new increase in symptoms, would recommend EGD and colonoscopy to evaluate. Risks versus benefits as well instructions reviewed, pt agrees to planned procedures. In the interim, would start with labs and stool testing and initiate therapy with hydrocortisone suppository. All questions answered.

## 2024-05-08 LAB
ALBUMIN SERPL ELPH-MCNC: 4 G/DL
ALP BLD-CCNC: 80 U/L
ALT SERPL-CCNC: 13 U/L
ANION GAP SERPL CALC-SCNC: 12 MMOL/L
AST SERPL-CCNC: 14 U/L
BASOPHILS # BLD AUTO: 0.09 K/UL
BASOPHILS NFR BLD AUTO: 1.8 %
BILIRUB SERPL-MCNC: 0.7 MG/DL
BUN SERPL-MCNC: 14 MG/DL
CALCIUM SERPL-MCNC: 8.9 MG/DL
CDIFF BY PCR: NOT DETECTED
CHLORIDE SERPL-SCNC: 105 MMOL/L
CO2 SERPL-SCNC: 26 MMOL/L
CREAT SERPL-MCNC: 0.6 MG/DL
CRP SERPL-MCNC: <3 MG/L
EGFR: 99 ML/MIN/1.73M2
EOSINOPHIL # BLD AUTO: 0 K/UL
EOSINOPHIL NFR BLD AUTO: 0 %
GI PCR PANEL: NOT DETECTED
GLUCOSE SERPL-MCNC: 169 MG/DL
HAV IGM SER QL: NONREACTIVE
HBV CORE IGM SER QL: NONREACTIVE
HBV SURFACE AG SER QL: NONREACTIVE
HCT VFR BLD CALC: 32.9 %
HCV AB SER QL: NONREACTIVE
HCV S/CO RATIO: 0.13 S/CO
HGB BLD-MCNC: 9.5 G/DL
LYMPHOCYTES # BLD AUTO: 1.09 K/UL
LYMPHOCYTES NFR BLD AUTO: 22.8 %
MAN DIFF?: NORMAL
MCHC RBC-ENTMCNC: 28.9 GM/DL
MCHC RBC-ENTMCNC: 31.7 PG
MCV RBC AUTO: 109.7 FL
MONOCYTES # BLD AUTO: 0.12 K/UL
MONOCYTES NFR BLD AUTO: 2.6 %
NEUTROPHILS # BLD AUTO: 3.49 K/UL
NEUTROPHILS NFR BLD AUTO: 72.8 %
PLATELET # BLD AUTO: 126 K/UL
POTASSIUM SERPL-SCNC: 4 MMOL/L
PROT SERPL-MCNC: 5.8 G/DL
RBC # BLD: 3 M/UL
RBC # FLD: 15.1 %
SODIUM SERPL-SCNC: 143 MMOL/L
WBC # FLD AUTO: 4.8 K/UL

## 2024-05-09 ENCOUNTER — APPOINTMENT (OUTPATIENT)
Dept: FAMILY MEDICINE | Facility: CLINIC | Age: 69
End: 2024-05-09
Payer: MEDICARE

## 2024-05-09 ENCOUNTER — APPOINTMENT (OUTPATIENT)
Dept: CARDIOLOGY | Facility: CLINIC | Age: 69
End: 2024-05-09
Payer: MEDICARE

## 2024-05-09 ENCOUNTER — NON-APPOINTMENT (OUTPATIENT)
Age: 69
End: 2024-05-09

## 2024-05-09 VITALS
HEART RATE: 81 BPM | BODY MASS INDEX: 37.42 KG/M2 | DIASTOLIC BLOOD PRESSURE: 70 MMHG | OXYGEN SATURATION: 98 % | SYSTOLIC BLOOD PRESSURE: 122 MMHG | WEIGHT: 218 LBS

## 2024-05-09 VITALS
SYSTOLIC BLOOD PRESSURE: 118 MMHG | TEMPERATURE: 97.7 F | HEIGHT: 64 IN | OXYGEN SATURATION: 95 % | BODY MASS INDEX: 36.66 KG/M2 | WEIGHT: 214.7 LBS | DIASTOLIC BLOOD PRESSURE: 60 MMHG | HEART RATE: 91 BPM

## 2024-05-09 DIAGNOSIS — Z01.818 ENCOUNTER FOR OTHER PREPROCEDURAL EXAMINATION: ICD-10-CM

## 2024-05-09 DIAGNOSIS — I48.0 PAROXYSMAL ATRIAL FIBRILLATION: ICD-10-CM

## 2024-05-09 DIAGNOSIS — R09.82 POSTNASAL DRIP: ICD-10-CM

## 2024-05-09 DIAGNOSIS — R06.00 DYSPNEA, UNSPECIFIED: ICD-10-CM

## 2024-05-09 DIAGNOSIS — F32.A DEPRESSION, UNSPECIFIED: ICD-10-CM

## 2024-05-09 DIAGNOSIS — I26.99 OTHER PULMONARY EMBOLISM W/OUT ACUTE COR PULMONALE: ICD-10-CM

## 2024-05-09 DIAGNOSIS — J45.909 UNSPECIFIED ASTHMA, UNCOMPLICATED: ICD-10-CM

## 2024-05-09 DIAGNOSIS — D64.9 ANEMIA, UNSPECIFIED: ICD-10-CM

## 2024-05-09 DIAGNOSIS — Z01.810 ENCOUNTER FOR PREPROCEDURAL CARDIOVASCULAR EXAMINATION: ICD-10-CM

## 2024-05-09 DIAGNOSIS — E11.9 TYPE 2 DIABETES MELLITUS W/OUT COMPLICATIONS: ICD-10-CM

## 2024-05-09 DIAGNOSIS — E78.5 HYPERLIPIDEMIA, UNSPECIFIED: ICD-10-CM

## 2024-05-09 DIAGNOSIS — M81.0 AGE-RELATED OSTEOPOROSIS W/OUT CURRENT PATHOLOGICAL FRACTURE: ICD-10-CM

## 2024-05-09 DIAGNOSIS — K50.90 CROHN'S DISEASE, UNSPECIFIED, W/OUT COMPLICATIONS: ICD-10-CM

## 2024-05-09 LAB — S PYO AG SPEC QL IA: NEGATIVE

## 2024-05-09 PROCEDURE — 99214 OFFICE O/P EST MOD 30 MIN: CPT | Mod: 25

## 2024-05-09 PROCEDURE — 93000 ELECTROCARDIOGRAM COMPLETE: CPT | Mod: NC

## 2024-05-09 PROCEDURE — 87880 STREP A ASSAY W/OPTIC: CPT | Mod: QW

## 2024-05-09 PROCEDURE — 99215 OFFICE O/P EST HI 40 MIN: CPT

## 2024-05-09 PROCEDURE — G2211 COMPLEX E/M VISIT ADD ON: CPT

## 2024-05-09 RX ORDER — OXCARBAZEPINE 300 MG/1
300 TABLET, FILM COATED ORAL
Qty: 180 | Refills: 0 | Status: ACTIVE | COMMUNITY
Start: 2020-09-03 | End: 1900-01-01

## 2024-05-09 RX ORDER — INSULIN LISPRO 100 [IU]/ML
100 INJECTION, SOLUTION INTRAVENOUS; SUBCUTANEOUS
Refills: 0 | Status: ACTIVE | COMMUNITY
Start: 2024-05-09

## 2024-05-09 RX ORDER — MINOXIDIL 2.5 MG/1
2.5 TABLET ORAL
Refills: 0 | Status: COMPLETED | COMMUNITY
Start: 2023-11-04 | End: 2024-05-09

## 2024-05-09 RX ORDER — SITAGLIPTIN 100 MG/1
100 TABLET, FILM COATED ORAL
Qty: 90 | Refills: 0 | Status: COMPLETED | COMMUNITY
Start: 2023-02-13 | End: 2024-05-09

## 2024-05-09 RX ORDER — AZELASTINE HYDROCHLORIDE 137 UG/1
137 SPRAY, METERED NASAL TWICE DAILY
Qty: 1 | Refills: 2 | Status: ACTIVE | COMMUNITY
Start: 2024-05-09 | End: 1900-01-01

## 2024-05-09 RX ORDER — ALBUTEROL SULFATE 90 UG/1
108 (90 BASE) INHALANT RESPIRATORY (INHALATION)
Qty: 8.5 | Refills: 0 | Status: COMPLETED | COMMUNITY
Start: 2019-08-14 | End: 2024-05-09

## 2024-05-09 RX ORDER — INSULIN GLARGINE 100 [IU]/ML
100 INJECTION, SOLUTION SUBCUTANEOUS AT BEDTIME
Refills: 0 | Status: ACTIVE | COMMUNITY
Start: 2024-05-09

## 2024-05-09 RX ORDER — TIRZEPATIDE 2.5 MG/.5ML
2.5 INJECTION, SOLUTION SUBCUTANEOUS
Refills: 0 | Status: ACTIVE | COMMUNITY
Start: 2024-05-09

## 2024-05-09 RX ORDER — LEVOFLOXACIN 750 MG/1
750 TABLET, FILM COATED ORAL
Qty: 7 | Refills: 0 | Status: COMPLETED | COMMUNITY
Start: 2024-02-13

## 2024-05-09 RX ORDER — APIXABAN 5 MG/1
5 TABLET, FILM COATED ORAL
Qty: 180 | Refills: 0 | Status: ACTIVE | COMMUNITY
Start: 2023-02-13 | End: 1900-01-01

## 2024-05-09 RX ORDER — INSULIN GLARGINE 100 [IU]/ML
100 INJECTION, SOLUTION SUBCUTANEOUS
Qty: 2 | Refills: 1 | Status: COMPLETED | COMMUNITY
Start: 2022-03-29 | End: 2024-05-09

## 2024-05-09 RX ORDER — FLUTICASONE PROPIONATE AND SALMETEROL 100; 50 UG/1; UG/1
100-50 POWDER RESPIRATORY (INHALATION)
Qty: 60 | Refills: 2 | Status: ACTIVE | COMMUNITY
Start: 2023-02-13 | End: 1900-01-01

## 2024-05-09 RX ORDER — DULOXETINE HYDROCHLORIDE 30 MG/1
30 CAPSULE, DELAYED RELEASE PELLETS ORAL
Qty: 90 | Refills: 1 | Status: ACTIVE | COMMUNITY
Start: 2024-03-01 | End: 1900-01-01

## 2024-05-09 RX ORDER — OXYCODONE 5 MG/1
5 TABLET ORAL
Qty: 30 | Refills: 0 | Status: COMPLETED | COMMUNITY
Start: 2023-11-04 | End: 2024-05-09

## 2024-05-09 NOTE — PHYSICAL EXAM
[Well Developed] : well developed [Well Nourished] : well nourished [No Acute Distress] : no acute distress [Normal Conjunctiva] : normal conjunctiva [Normal Venous Pressure] : normal venous pressure [No Carotid Bruit] : no carotid bruit [Normal S1, S2] : normal S1, S2 [No Murmur] : no murmur [No Rub] : no rub [No Gallop] : no gallop [Clear Lung Fields] : clear lung fields [Good Air Entry] : good air entry [No Respiratory Distress] : no respiratory distress  [Soft] : abdomen soft [Non Tender] : non-tender [No Masses/organomegaly] : no masses/organomegaly [Normal Bowel Sounds] : normal bowel sounds [No Edema] : no edema [No Cyanosis] : no cyanosis [No Clubbing] : no clubbing [No Varicosities] : no varicosities [No Rash] : no rash [No Skin Lesions] : no skin lesions [Moves all extremities] : moves all extremities [No Focal Deficits] : no focal deficits [Normal Speech] : normal speech [Alert and Oriented] : alert and oriented [Normal memory] : normal memory [de-identified] : premature beats

## 2024-05-09 NOTE — ASSESSMENT
[Patient Optimized for Surgery] : Patient optimized for surgery [FreeTextEntry4] : Pt is medically optimized for procedure at this time.  Pt has diabetes pt seeing endo. Pt holding mounjaro 1 week prior to surgery. Hold humalog day of surgery. Pt to take half dose of lantus night before procedure (25 units). Monitor glucose and BP perioperatively. Anemia stable, monitor cbc periop. Pt has hx asthma, refilled albuterol prn, monitor for bronchospasm.  Pt with hx of PAF, PE. Pt currently on eliquis bid. Pt to hold eliquis x 2 days prior to procedure. Pt to restart medication after surgery.  Fax to  Attn Sunday  Depression - worsened recently as ex  passed away. restart duloxetine. PND- azelastine intranasal  Spent 40 minutes reviewing current medications and treatment plan with patient and family. Refilled meds needed.

## 2024-05-09 NOTE — PHYSICAL EXAM
[Soft] : abdomen soft [Normal] : affect was normal and insight and judgment were intact [de-identified] : +abdominal tenderness

## 2024-05-09 NOTE — RESULTS/DATA
[] : results reviewed [de-identified] : hba1c 5.4 repeat hb 9.8 [de-identified] : Hb 9.6 [de-identified] : glucose 169 [de-identified] : EKG- NSR 80  bpm no st t changes, frequent PACs

## 2024-05-09 NOTE — HISTORY OF PRESENT ILLNESS
[FreeTextEntry1] : Pt is a 67 y/o F PMH PVCs, DM, HLD, hypothyroidism, asthma.  COVID+ 01/2021 hospitalized for 1 mnth at [facility] 08/2022 admitted for urosepsis with complicated course - prolonged intubation, DVT/PE, toe amputation required.  She is now on Eliquis She had to go back to the hospital ([facility]) 02/2023 for 24hrs because she hit her head.   She fell 07/2023 and broke humerus  Pt is scheduled for colonoscopy 05/13/2024 with Dr Soto at .   Pt reports feeling well and has no active cardiac complaints - denies CP, SOB, palpitations, dizziness, syncope, edema, orthopnea, PND, orthopnea.  No exertional symptoms.  TTE 04/2018 normal LV function, mild DD, mild PI TTE 04/2021 normal LV function without significant valvular pathology TTE 01/2023 EF 67%, mild MR/TR/PI Nuclear stress test 09/2020 breast and diaphragm attenuation.  Small, mild defect in anterior wall that is reversible suggestive of mild ischemia, EF 66% CCTA 06/2022 Ca score = 0, LAD 10% Nuc stress test 11/2023 normal myocardial perfusion jarrett 02/2022 showed PVCs 4.6% jarrett 02/2023 NSR without significant ectopy (PVCs <1%) jarrett 10/2023 NSR PVCs 12.5%  PMH: DM, hypothyroidism, asthma, nephrolithiasis, gastric bypass, Crohns hematologist - Dr Eduardo Smoking status: quit 16 yrs ago Current exercise: none Daily water intake: 32 oz Daily caffeine intake: 1 cup coffee OTC medications: see list Family hx: mother and father stroke, mother PPM, father ?MI at age 70 Previous hospitalizations: knee and back surgery

## 2024-05-09 NOTE — HISTORY OF PRESENT ILLNESS
[Atrial Fibrillation] : atrial fibrillation [Asthma] : asthma [No Adverse Anesthesia Reaction] : no adverse anesthesia reaction in self or family member [Chronic Anticoagulation] : chronic anticoagulation [Diabetes] : diabetes [(Patient denies any chest pain, claudication, dyspnea on exertion, orthopnea, palpitations or syncope)] : Patient denies any chest pain, claudication, dyspnea on exertion, orthopnea, palpitations or syncope [Aortic Stenosis] : no aortic stenosis [Smoker] : not a smoker [Chronic Kidney Disease] : no chronic kidney disease [Moderate (4-6 METs)] : Moderate (4-6 METs) [FreeTextEntry1] : colonoscopy [FreeTextEntry2] : 5/13/24 [FreeTextEntry3] : Dr. Soto [FreeTextEntry4] : Pt in office for medical clearance. Pt to go for procedure for colonoscopy.  Pt denies chest pain, palpitations, dyspnea, fever, chills, nausea, or vomiting. Pt has hx of DM, hypothyroidism, asthma, nephrolithiasis, gastric bypass, Crohns, anemia, seizure d/o, PAF, asthma, hx of PE, depression, frequent falls. DM pt seeing endo Dr. Borrero. Pt on metformin 1000mg bid, januvia was d/c'ed, started on mounjaro 2.5mg weekly. Pt on lantus 50 units qhs and humalog 20 units premeals. Pt has appt for f/u in several weeks. Pt on eliquis for hx of PE and PAF.  Pt to make appt w/ GI to follow up on Crohn's. Pt has hx of anemia after Crohn's flareup, pt has been f/u with hematology Dr. linton, last hb 9.6 on 5/9/24 labs Pt has obtained cardiac clearance from cardiology Dr. Callahan Pt c/o several days of sinus pressure, PND, pharyngitis. [FreeTextEntry7] : TTE 01/2023 EF 67%, mild MR/TR/PI CCTA 06/2022 Ca score = 0, LAD 10% Nuc stress test 11/2023 normal myocardial perfusion jarrett 10/2023 NSR PVCs 12.5%

## 2024-05-09 NOTE — DISCUSSION/SUMMARY
[EKG obtained to assist in diagnosis and management of assessed problem(s)] : EKG obtained to assist in diagnosis and management of assessed problem(s) [FreeTextEntry1] : Pt is a 67 y/o F PMH PVCs, DM, HLD, hypothyroidism, asthma.  COVID+ 01/2021 hospitalized for 1 mnth at [facility] 08/2022 admitted for urosepsis with complicated course - prolonged intubation, DVT/PE, toe amputation required.  She is now on Eliquis She had to go back to the hospital ([facility]) 02/2023 for 24hrs because she hit her head.   She fell 07/2023 and broke humerus  Pt is scheduled for colonoscopy 05/13/2024 with Dr Soto at .   Pt reports feeling well and has no active cardiac complaints - denies CP, SOB, palpitations, dizziness, syncope, edema, orthopnea, PND, orthopnea.  No exertional symptoms. Todays ECG shows NSR with PACs - unchanged At this time the pt has no signs or symptoms of active ischemia, heart failure, life-threatening arrhythmias, severe valvular pathology. VSS There are no cardiac contraindications for planned procedure.  She can hold Eliquis for 3 days prior   TTE 04/2018 normal LV function, mild DD, mild PI TTE 04/2021 normal LV function without significant valvular pathology TTE 01/2023 EF 67%, mild MR/TR/PI Nuclear stress test 09/2020 breast and diaphragm attenuation.  Small, mild defect in anterior wall that is reversible suggestive of mild ischemia, EF 66% CCTA 06/2022 Ca score = 0, LAD 10% Nuc stress test 11/2023 normal myocardial perfusion jarrett 02/2022 showed PVCs 4.6% jarrett 02/2023 NSR without significant ectopy (PVCs <1%) jarrett 10/2023 NSR PVCs 12.5%  LE edema: c/w lasix 20mg qd ADvised low salt diet, weight loss, regular exercise  hypotension: BP very stable - now off midodrine monitor closely  Pt was on amiodarone after prolonged hospitalization I am assuming she had parox AF at the time Her jarrett had not shown any arrhythmias She is now off amio PVCs - now on metoprolol  DVT/PE: on Eliquis refer to hem  DM: follows with PCP c/w current meds goal A1c <7 Advised lifestyle modifications  VSS  HLD: c/w statin goal LDL <70 Advised lifestyle modifications   DVT/PE: on Eliquis following with hem  toe amputation: refer to vascular  Pt will return in 6 mnths or sooner as needed but I encouraged communication via phone or portal if necessary.  We will call pt with test results when applicable and arrange for expedited follow up if necessary.  The described plan was discussed with the pt.  All questions and concerns were addressed to the best of my knowledge.

## 2024-05-10 ENCOUNTER — RX RENEWAL (OUTPATIENT)
Age: 69
End: 2024-05-10

## 2024-05-13 ENCOUNTER — RESULT REVIEW (OUTPATIENT)
Age: 69
End: 2024-05-13

## 2024-05-13 ENCOUNTER — APPOINTMENT (OUTPATIENT)
Dept: GASTROENTEROLOGY | Facility: HOSPITAL | Age: 69
End: 2024-05-13

## 2024-05-13 ENCOUNTER — OUTPATIENT (OUTPATIENT)
Dept: OUTPATIENT SERVICES | Facility: HOSPITAL | Age: 69
LOS: 1 days | Discharge: HOME | End: 2024-05-13
Payer: MEDICARE

## 2024-05-13 VITALS
WEIGHT: 216.93 LBS | TEMPERATURE: 97 F | RESPIRATION RATE: 14 BRPM | SYSTOLIC BLOOD PRESSURE: 113 MMHG | DIASTOLIC BLOOD PRESSURE: 57 MMHG | OXYGEN SATURATION: 100 % | HEIGHT: 64 IN | HEART RATE: 81 BPM

## 2024-05-13 DIAGNOSIS — Z98.89 OTHER SPECIFIED POSTPROCEDURAL STATES: Chronic | ICD-10-CM

## 2024-05-13 DIAGNOSIS — M75.120 COMPLETE ROTATOR CUFF TEAR OR RUPTURE OF UNSPECIFIED SHOULDER, NOT SPECIFIED AS TRAUMATIC: Chronic | ICD-10-CM

## 2024-05-13 DIAGNOSIS — K50.10 CROHN'S DISEASE OF LARGE INTESTINE WITHOUT COMPLICATIONS: ICD-10-CM

## 2024-05-13 DIAGNOSIS — Z90.49 ACQUIRED ABSENCE OF OTHER SPECIFIED PARTS OF DIGESTIVE TRACT: Chronic | ICD-10-CM

## 2024-05-13 DIAGNOSIS — Z41.1 ENCOUNTER FOR COSMETIC SURGERY: Chronic | ICD-10-CM

## 2024-05-13 DIAGNOSIS — Z98.84 BARIATRIC SURGERY STATUS: Chronic | ICD-10-CM

## 2024-05-13 DIAGNOSIS — L02.92 FURUNCLE, UNSPECIFIED: Chronic | ICD-10-CM

## 2024-05-13 DIAGNOSIS — K46.9 UNSPECIFIED ABDOMINAL HERNIA WITHOUT OBSTRUCTION OR GANGRENE: Chronic | ICD-10-CM

## 2024-05-13 DIAGNOSIS — Z96.653 PRESENCE OF ARTIFICIAL KNEE JOINT, BILATERAL: Chronic | ICD-10-CM

## 2024-05-13 LAB — GLUCOSE BLDC GLUCOMTR-MCNC: 127 MG/DL — HIGH (ref 70–99)

## 2024-05-13 PROCEDURE — 88305 TISSUE EXAM BY PATHOLOGIST: CPT

## 2024-05-13 PROCEDURE — 45380 COLONOSCOPY AND BIOPSY: CPT

## 2024-05-13 PROCEDURE — 82962 GLUCOSE BLOOD TEST: CPT

## 2024-05-13 PROCEDURE — 88312 SPECIAL STAINS GROUP 1: CPT | Mod: 26

## 2024-05-13 PROCEDURE — 43239 EGD BIOPSY SINGLE/MULTIPLE: CPT

## 2024-05-13 PROCEDURE — 88305 TISSUE EXAM BY PATHOLOGIST: CPT | Mod: 26

## 2024-05-13 PROCEDURE — 88312 SPECIAL STAINS GROUP 1: CPT

## 2024-05-13 RX ORDER — OXYCODONE HYDROCHLORIDE 5 MG/1
1 TABLET ORAL
Qty: 0 | Refills: 0 | DISCHARGE

## 2024-05-13 RX ORDER — SITAGLIPTIN 50 MG/1
1 TABLET, FILM COATED ORAL
Qty: 0 | Refills: 0 | DISCHARGE

## 2024-05-13 RX ORDER — POTASSIUM CITRATE AND CITRIC ACID MONOHYDRATE 1100; 334 MG/5ML; MG/5ML
10 SOLUTION ORAL
Refills: 0 | DISCHARGE

## 2024-05-13 RX ORDER — CYCLOBENZAPRINE HYDROCHLORIDE 10 MG/1
1 TABLET, FILM COATED ORAL
Qty: 0 | Refills: 0 | DISCHARGE

## 2024-05-13 RX ORDER — FLUTICASONE PROPIONATE AND SALMETEROL 50; 250 UG/1; UG/1
0 POWDER ORAL; RESPIRATORY (INHALATION)
Qty: 0 | Refills: 0 | DISCHARGE

## 2024-05-13 RX ORDER — ASCORBIC ACID 60 MG
0 TABLET,CHEWABLE ORAL
Qty: 0 | Refills: 0 | DISCHARGE

## 2024-05-13 RX ORDER — INSULIN LISPRO 100 [IU]/ML
20 INJECTION, SUSPENSION SUBCUTANEOUS
Refills: 0 | DISCHARGE

## 2024-05-13 RX ORDER — MESALAMINE 400 MG
0 TABLET, DELAYED RELEASE (ENTERIC COATED) ORAL
Refills: 0 | DISCHARGE

## 2024-05-13 RX ORDER — CHOLECALCIFEROL (VITAMIN D3) 125 MCG
0 CAPSULE ORAL
Qty: 0 | Refills: 0 | DISCHARGE

## 2024-05-13 RX ORDER — METFORMIN HYDROCHLORIDE 850 MG/1
1 TABLET ORAL
Qty: 0 | Refills: 0 | DISCHARGE

## 2024-05-13 RX ORDER — MONTELUKAST 4 MG/1
1 TABLET, CHEWABLE ORAL
Qty: 0 | Refills: 0 | DISCHARGE

## 2024-05-13 RX ORDER — OXCARBAZEPINE 300 MG/1
1 TABLET, FILM COATED ORAL
Refills: 0 | DISCHARGE

## 2024-05-13 RX ORDER — SEMAGLUTIDE 0.68 MG/ML
0 INJECTION, SOLUTION SUBCUTANEOUS
Refills: 0 | DISCHARGE

## 2024-05-13 RX ORDER — MONTELUKAST 4 MG/1
1 TABLET, CHEWABLE ORAL
Refills: 0 | DISCHARGE

## 2024-05-13 RX ORDER — PANTOPRAZOLE SODIUM 20 MG/1
1 TABLET, DELAYED RELEASE ORAL
Qty: 0 | Refills: 0 | DISCHARGE

## 2024-05-13 RX ORDER — DEXLANSOPRAZOLE 30 MG/1
1 CAPSULE, DELAYED RELEASE ORAL
Refills: 0 | DISCHARGE

## 2024-05-13 RX ORDER — ALBUTEROL 90 UG/1
0 AEROSOL, METERED ORAL
Qty: 0 | Refills: 0 | DISCHARGE

## 2024-05-13 RX ORDER — FUROSEMIDE 40 MG
1 TABLET ORAL
Refills: 0 | DISCHARGE

## 2024-05-13 RX ORDER — OXCARBAZEPINE 300 MG/1
1 TABLET, FILM COATED ORAL
Qty: 0 | Refills: 0 | DISCHARGE

## 2024-05-13 RX ORDER — VIBEGRON 75 MG/1
1 TABLET, FILM COATED ORAL
Refills: 0 | DISCHARGE

## 2024-05-13 RX ORDER — LEVOTHYROXINE SODIUM 125 MCG
1 TABLET ORAL
Qty: 0 | Refills: 0 | DISCHARGE

## 2024-05-13 RX ORDER — DULOXETINE HYDROCHLORIDE 30 MG/1
1 CAPSULE, DELAYED RELEASE ORAL
Refills: 0 | DISCHARGE

## 2024-05-13 RX ORDER — GLIMEPIRIDE 1 MG
1 TABLET ORAL
Qty: 0 | Refills: 0 | DISCHARGE

## 2024-05-13 RX ORDER — TRAMADOL HYDROCHLORIDE 50 MG/1
1 TABLET ORAL
Qty: 0 | Refills: 0 | DISCHARGE

## 2024-05-13 RX ORDER — ATORVASTATIN CALCIUM 80 MG/1
1 TABLET, FILM COATED ORAL
Refills: 0 | DISCHARGE

## 2024-05-13 RX ORDER — METOPROLOL TARTRATE 50 MG
1 TABLET ORAL
Refills: 0 | DISCHARGE

## 2024-05-13 RX ORDER — METFORMIN HYDROCHLORIDE 850 MG/1
1 TABLET ORAL
Refills: 0 | DISCHARGE

## 2024-05-13 RX ORDER — LIDOCAINE 4 G/100G
1 CREAM TOPICAL
Refills: 0 | DISCHARGE

## 2024-05-13 RX ORDER — APIXABAN 2.5 MG/1
1 TABLET, FILM COATED ORAL
Refills: 0 | DISCHARGE

## 2024-05-13 RX ORDER — ACETAMINOPHEN 500 MG
1 TABLET ORAL
Qty: 0 | Refills: 0 | DISCHARGE

## 2024-05-13 RX ORDER — INSULIN GLARGINE 100 [IU]/ML
0 INJECTION, SOLUTION SUBCUTANEOUS
Refills: 0 | DISCHARGE

## 2024-05-13 RX ORDER — GABAPENTIN 400 MG/1
1 CAPSULE ORAL
Refills: 0 | DISCHARGE

## 2024-05-13 NOTE — ASU PATIENT PROFILE, ADULT - FALL HARM RISK - HARM RISK INTERVENTIONS
Assistance with ambulation/Assistance OOB with selected safe patient handling equipment/Communicate Risk of Fall with Harm to all staff/Discuss with provider need for PT consult/Monitor gait and stability/Reinforce activity limits and safety measures with patient and family/Tailored Fall Risk Interventions/Visual Cue: Yellow wristband and red socks/Bed in lowest position, wheels locked, appropriate side rails in place/Call bell, personal items and telephone in reach/Instruct patient to call for assistance before getting out of bed or chair/Non-slip footwear when patient is out of bed/[place] to call system/Physically safe environment - no spills, clutter or unnecessary equipment/Purposeful Proactive Rounding/Room/bathroom lighting operational, light cord in reach

## 2024-05-13 NOTE — ASU PATIENT PROFILE, ADULT - PAIN, FACTORS THAT RELIEVE, PROFILE
Returned call to pharmacy and advised per Dr. Alex Lal, patient has been taking med prescribed for over a year now. Pharmacy expressed understanding. medications/repositioning

## 2024-05-13 NOTE — ASU PATIENT PROFILE, ADULT - ABILITY TO HEAR (WITH HEARING AID OR HEARING APPLIANCE IF NORMALLY USED):
1 implanted hearing aid in right ear, 1 removable hearing aide left ear/Severely Impaired: absence of useful hearing

## 2024-05-13 NOTE — ASU PATIENT PROFILE, ADULT - NSICDXPASTMEDICALHX_GEN_ALL_CORE_FT
PAST MEDICAL HISTORY:  Anemia     Anxiety and depression     Asthma     Bladder tumor     Cardiac murmur     Carotid stenosis     Carpal tunnel syndrome     Cataract     Cellulitis     Colitis     COVID-19 2/2021    Crohns disease     Degenerative disc disease, lumbar     Diabetes mellitus     DJD (degenerative joint disease) B/L Knee Replacement    Dry eyes     Exostosis right middle finger    GERD (gastroesophageal reflux disease)     H/O cervical fracture     H/O diabetes mellitus     H/O fracture of humerus     H/O herpes zoster     Headache     Hemorrhoid     History of seizure     Hyperlipidemia     Hypotension     Hypothyroid     Lumbar radiculopathy     Major depression     Morbid obesity S/P Gastric By-Pass surgery    Muscle spasm of both lower legs     Obese     PAF (paroxysmal atrial fibrillation)     Panic attacks     Plantar fasciitis     PUD (peptic ulcer disease)     PUD (peptic ulcer disease)     Pulmonary embolism     Reflux     Renal calculi     Renal cyst     Rheumatoid arthritis     Seasonal allergic rhinitis     Seizure     Sleep apnea

## 2024-05-13 NOTE — ASU PREOP CHECKLIST - IDENTIFICATION BAND VERIFIED
[facility] -   Cardiac Catheterization  Procedure Note    SUMMARY     Aquiles Kelsey  [id number]  Saige Miranda MD    Date of Procedure: 10/11/2019    Procedure:  1. LHC  2. LEFT VENTRICULOGRAM 3. CORONARY ANGIOGRAM  4.  PCI MID LCX KYLIE X ONE  5.  PCI PROX-MID OM2 KYLIE X 2 OVERLAPPED    Provider: Robe Gilbert MD    Assisting Provider:  Toni Gracia    Indications: He was referred for cardiac catheterization to further evaluate NSTEMI.    Pre-Procedure Diagnosis:  NSTEMI    Post-Procedure Diagnosis:  PCI LCX/OM2    Anesthesia: RN IV Sedation    Description of the Findings of the Procedure:     The risks, benefits, complications, treatment options, and expected outcomes were discussed with the patient. The patient and/or family concurred with the proposed plan, giving informed consent. Patient was brought to the cath lab after IV hydration was begun and oral premedication was given    FINDINGS:  PCI MID LCX ISR WITH KYLIE X ONE AND SEVERE OM2 DISEASE WITH KYLIE X 2 OVERLAPPED.  OM2 STENTS OVERLAPPED INTO MID LCX OLD STENT.  MULTIVESSEL CAD  RCA SEVERE DISEASE, OCCLUDED MID STENT, MULTIPLE STENTS NOTED IN RCA (MID, DISTAL, PDA).  EF 45%  LEFT RADIAL TR BAND  SEE REPORT FOR COMPLETE DETAILS      Complications: None; patient tolerated the procedure well.    Estimated Blood Loss (EBL): Minimal           Implants: KYLIE X 3    Specimens: NONE    Condition: stable    Disposition: PACU - hemodynamically stable.    Attestation: I was present and scrubbed for the entire procedure.     RECOMMENDATIONS:    USUAL POST PCI CARE  ASA  PLAVIX  MAXIMIZE MED TX FOR CAD  RISK FACTOR MODIFICATION  TOBACCO CESSATION  MEDICAL TX VS ATTEMPT PCI RCA  CARDIAC DIET   done

## 2024-05-15 LAB
M TB IFN-G BLD-IMP: NEGATIVE
QUANTIFERON TB PLUS MITOGEN MINUS NIL: >10 IU/ML
QUANTIFERON TB PLUS NIL: 0.02 IU/ML
QUANTIFERON TB PLUS TB1 MINUS NIL: 0 IU/ML
QUANTIFERON TB PLUS TB2 MINUS NIL: 0 IU/ML
SURGICAL PATHOLOGY STUDY: SIGNIFICANT CHANGE UP

## 2024-05-16 LAB — CALPROTECTIN FECAL: 741 UG/G

## 2024-05-20 DIAGNOSIS — Z79.01 LONG TERM (CURRENT) USE OF ANTICOAGULANTS: ICD-10-CM

## 2024-05-20 DIAGNOSIS — K50.114 CROHN'S DISEASE OF LARGE INTESTINE WITH ABSCESS: ICD-10-CM

## 2024-05-20 DIAGNOSIS — Z87.891 PERSONAL HISTORY OF NICOTINE DEPENDENCE: ICD-10-CM

## 2024-05-20 DIAGNOSIS — Z98.84 BARIATRIC SURGERY STATUS: ICD-10-CM

## 2024-05-20 DIAGNOSIS — K50.10 CROHN'S DISEASE OF LARGE INTESTINE WITHOUT COMPLICATIONS: ICD-10-CM

## 2024-05-20 DIAGNOSIS — J45.909 UNSPECIFIED ASTHMA, UNCOMPLICATED: ICD-10-CM

## 2024-05-20 DIAGNOSIS — Z88.5 ALLERGY STATUS TO NARCOTIC AGENT: ICD-10-CM

## 2024-05-20 DIAGNOSIS — R19.7 DIARRHEA, UNSPECIFIED: ICD-10-CM

## 2024-05-20 DIAGNOSIS — I10 ESSENTIAL (PRIMARY) HYPERTENSION: ICD-10-CM

## 2024-05-20 DIAGNOSIS — Z79.85 LONG-TERM (CURRENT) USE OF INJECTABLE NON-INSULIN ANTIDIABETIC DRUGS: ICD-10-CM

## 2024-05-20 DIAGNOSIS — K21.9 GASTRO-ESOPHAGEAL REFLUX DISEASE WITHOUT ESOPHAGITIS: ICD-10-CM

## 2024-05-20 DIAGNOSIS — K29.50 UNSPECIFIED CHRONIC GASTRITIS WITHOUT BLEEDING: ICD-10-CM

## 2024-05-20 DIAGNOSIS — E11.9 TYPE 2 DIABETES MELLITUS WITHOUT COMPLICATIONS: ICD-10-CM

## 2024-05-20 DIAGNOSIS — Z86.718 PERSONAL HISTORY OF OTHER VENOUS THROMBOSIS AND EMBOLISM: ICD-10-CM

## 2024-05-20 DIAGNOSIS — Z79.84 LONG TERM (CURRENT) USE OF ORAL HYPOGLYCEMIC DRUGS: ICD-10-CM

## 2024-05-20 PROBLEM — Z86.39 PERSONAL HISTORY OF OTHER ENDOCRINE, NUTRITIONAL AND METABOLIC DISEASE: Chronic | Status: ACTIVE | Noted: 2024-05-13

## 2024-05-20 PROBLEM — E78.5 HYPERLIPIDEMIA, UNSPECIFIED: Chronic | Status: ACTIVE | Noted: 2024-05-13

## 2024-05-20 PROBLEM — F32.9 MAJOR DEPRESSIVE DISORDER, SINGLE EPISODE, UNSPECIFIED: Chronic | Status: ACTIVE | Noted: 2024-05-13

## 2024-05-20 PROBLEM — I26.99 OTHER PULMONARY EMBOLISM WITHOUT ACUTE COR PULMONALE: Chronic | Status: ACTIVE | Noted: 2024-05-13

## 2024-05-20 PROBLEM — I48.0 PAROXYSMAL ATRIAL FIBRILLATION: Chronic | Status: ACTIVE | Noted: 2024-05-13

## 2024-05-20 PROBLEM — Z87.898 PERSONAL HISTORY OF OTHER SPECIFIED CONDITIONS: Chronic | Status: ACTIVE | Noted: 2024-05-13

## 2024-06-06 ENCOUNTER — APPOINTMENT (OUTPATIENT)
Dept: GASTROENTEROLOGY | Facility: CLINIC | Age: 69
End: 2024-06-06

## 2024-06-07 ENCOUNTER — RX RENEWAL (OUTPATIENT)
Age: 69
End: 2024-06-07

## 2024-06-07 RX ORDER — INSULIN LISPRO 100 [IU]/ML
100 INJECTION, SOLUTION INTRAVENOUS; SUBCUTANEOUS
Qty: 30 | Refills: 0 | Status: ACTIVE | COMMUNITY
Start: 2023-03-09 | End: 1900-01-01

## 2024-06-26 ENCOUNTER — APPOINTMENT (OUTPATIENT)
Dept: DERMATOLOGY | Facility: CLINIC | Age: 69
End: 2024-06-26
Payer: MEDICARE

## 2024-06-26 DIAGNOSIS — L65.0 TELOGEN EFFLUVIUM: ICD-10-CM

## 2024-06-26 DIAGNOSIS — L29.9 PRURITUS, UNSPECIFIED: ICD-10-CM

## 2024-06-26 PROCEDURE — 99213 OFFICE O/P EST LOW 20 MIN: CPT

## 2024-06-26 RX ORDER — GABAPENTIN 100 MG/1
100 CAPSULE ORAL
Qty: 270 | Refills: 1 | Status: ACTIVE | COMMUNITY
Start: 2024-01-10 | End: 1900-01-01

## 2024-06-26 RX ORDER — MINOXIDIL 2.5 MG/1
2.5 TABLET ORAL
Qty: 90 | Refills: 1 | Status: ACTIVE | COMMUNITY
Start: 2021-11-08 | End: 1900-01-01

## 2024-06-26 RX ORDER — DUTASTERIDE 0.5 MG/1
0.5 CAPSULE, LIQUID FILLED ORAL
Qty: 1 | Refills: 1 | Status: ACTIVE | COMMUNITY
Start: 2024-06-26 | End: 1900-01-01

## 2024-07-05 ENCOUNTER — APPOINTMENT (OUTPATIENT)
Dept: NEUROLOGY | Facility: CLINIC | Age: 69
End: 2024-07-05

## 2024-07-08 ENCOUNTER — APPOINTMENT (OUTPATIENT)
Dept: NEUROLOGY | Facility: CLINIC | Age: 69
End: 2024-07-08
Payer: MEDICARE

## 2024-07-08 VITALS
HEART RATE: 81 BPM | HEIGHT: 64 IN | SYSTOLIC BLOOD PRESSURE: 90 MMHG | WEIGHT: 215 LBS | BODY MASS INDEX: 36.7 KG/M2 | OXYGEN SATURATION: 98 % | DIASTOLIC BLOOD PRESSURE: 57 MMHG

## 2024-07-08 DIAGNOSIS — R51.9 HEADACHE, UNSPECIFIED: ICD-10-CM

## 2024-07-08 DIAGNOSIS — R19.8 OTHER SPECIFIED SYMPTOMS AND SIGNS INVOLVING THE DIGESTIVE SYSTEM AND ABDOMEN: ICD-10-CM

## 2024-07-08 DIAGNOSIS — R20.0 ANESTHESIA OF SKIN: ICD-10-CM

## 2024-07-08 PROCEDURE — 99215 OFFICE O/P EST HI 40 MIN: CPT

## 2024-07-08 PROCEDURE — G2211 COMPLEX E/M VISIT ADD ON: CPT

## 2024-07-19 ENCOUNTER — NON-APPOINTMENT (OUTPATIENT)
Age: 69
End: 2024-07-19

## 2024-07-22 ENCOUNTER — APPOINTMENT (OUTPATIENT)
Dept: FAMILY MEDICINE | Facility: CLINIC | Age: 69
End: 2024-07-22
Payer: MEDICARE

## 2024-07-22 ENCOUNTER — LABORATORY RESULT (OUTPATIENT)
Age: 69
End: 2024-07-22

## 2024-07-22 VITALS
TEMPERATURE: 97.3 F | WEIGHT: 196.7 LBS | HEART RATE: 76 BPM | SYSTOLIC BLOOD PRESSURE: 104 MMHG | BODY MASS INDEX: 33.58 KG/M2 | DIASTOLIC BLOOD PRESSURE: 60 MMHG | OXYGEN SATURATION: 96 % | HEIGHT: 64 IN

## 2024-07-22 DIAGNOSIS — I48.0 PAROXYSMAL ATRIAL FIBRILLATION: ICD-10-CM

## 2024-07-22 DIAGNOSIS — D64.9 ANEMIA, UNSPECIFIED: ICD-10-CM

## 2024-07-22 DIAGNOSIS — R56.9 UNSPECIFIED CONVULSIONS: ICD-10-CM

## 2024-07-22 PROCEDURE — 99495 TRANSJ CARE MGMT MOD F2F 14D: CPT

## 2024-07-22 RX ORDER — LEVETIRACETAM 750 MG/1
750 TABLET, FILM COATED ORAL TWICE DAILY
Qty: 60 | Refills: 0 | Status: ACTIVE | COMMUNITY
Start: 2024-07-22 | End: 1900-01-01

## 2024-07-22 RX ORDER — PANTOPRAZOLE 40 MG/1
40 TABLET, DELAYED RELEASE ORAL DAILY
Qty: 30 | Refills: 0 | Status: ACTIVE | COMMUNITY
Start: 2024-07-22

## 2024-07-24 NOTE — ASSESSMENT
[FreeTextEntry1] : crohn's flare, anemia - recheck cbc, fe panel. f/u with GI. cont bactrim and prednisone course DM - check a1c. f/u endo. increased due to being on prednisone seizure - cont keprra. f/u with neuro PAF - cont eliquis monitor cbc

## 2024-07-24 NOTE — HISTORY OF PRESENT ILLNESS
[Post-hospitalization from ___ Hospital] : Post-hospitalization from [unfilled] Hospital [Admitted on: ___] : The patient was admitted on [unfilled] [Discharged on ___] : discharged on [unfilled] [Discharge Summary] : discharge summary [Pertinent Labs] : pertinent labs [Discharge Med List] : discharge medication list [Med Reconciliation] : medication reconciliation has been completed [Patient Contacted By: ____] : and contacted by [unfilled] [FreeTextEntry2] : Pt s/p hospitalization for seizure/arrhythmia. Pt went to ER for head pain and chest pain, was dx with Crohn's flareup w/ bleeding, was anemic in hospital on admission hb 8.2, sepsis w/ hypotension. Pt rec'd 4 units PRBCs. Pt had 2 seizures in hospital and arrhythmia in afib. Pt was on oxcarbazepine added on keppra 750mg bid. Pt to make appt w/ neuro and GI. Pt was discharged on bactrim x 7 weeks and prednisone taper. Eliquis was resumed after GI bleeding stopped. Pt had hyperglycemia in hospital >200, saw endocrinologist in hospital, will f/u with endo.

## 2024-07-24 NOTE — PHYSICAL EXAM
[No Acute Distress] : no acute distress [Well Nourished] : well nourished [Soft] : abdomen soft [Normal] : affect was normal and insight and judgment were intact [de-identified] : mild abdominal tenderness [77189 - Moderate Complexity requires multiple possible diagnoses and/or the management options, moderate complexity of the medical data (tests, etc.) to be reviewed, and moderate risk of significant complications, morbidity, and/or mortality as well as co] : Moderate Complexity

## 2024-07-24 NOTE — PHYSICAL EXAM
[No Acute Distress] : no acute distress [Well Nourished] : well nourished [Soft] : abdomen soft [Normal] : affect was normal and insight and judgment were intact [de-identified] : mild abdominal tenderness [41027 - Moderate Complexity requires multiple possible diagnoses and/or the management options, moderate complexity of the medical data (tests, etc.) to be reviewed, and moderate risk of significant complications, morbidity, and/or mortality as well as co] : Moderate Complexity

## 2024-07-25 ENCOUNTER — RX RENEWAL (OUTPATIENT)
Age: 69
End: 2024-07-25

## 2024-07-27 LAB
BASOPHILS # BLD AUTO: 0 K/UL
BASOPHILS NFR BLD AUTO: 0 %
EOSINOPHIL # BLD AUTO: 0 K/UL
EOSINOPHIL NFR BLD AUTO: 0 %
FERRITIN SERPL-MCNC: 870 NG/ML
HCT VFR BLD CALC: 37.1 %
HGB BLD-MCNC: 11.3 G/DL
IRON SATN MFR SERPL: 51 %
IRON SERPL-MCNC: 145 UG/DL
LYMPHOCYTES # BLD AUTO: 1.11 K/UL
LYMPHOCYTES NFR BLD AUTO: 9.7 %
MAN DIFF?: NORMAL
MCHC RBC-ENTMCNC: 30.5 GM/DL
MCHC RBC-ENTMCNC: 32.4 PG
MCV RBC AUTO: 106.3 FL
MONOCYTES # BLD AUTO: 0.3 K/UL
MONOCYTES NFR BLD AUTO: 2.6 %
NEUTROPHILS # BLD AUTO: 10.04 K/UL
NEUTROPHILS NFR BLD AUTO: 87.7 %
PLATELET # BLD AUTO: 179 K/UL
RBC # BLD: 3.49 M/UL
RBC # FLD: 14.2 %
TIBC SERPL-MCNC: 283 UG/DL
UIBC SERPL-MCNC: 138 UG/DL
WBC # FLD AUTO: 11.45 K/UL

## 2024-08-01 ENCOUNTER — APPOINTMENT (OUTPATIENT)
Dept: GASTROENTEROLOGY | Facility: CLINIC | Age: 69
End: 2024-08-01
Payer: MEDICARE

## 2024-08-01 VITALS
SYSTOLIC BLOOD PRESSURE: 100 MMHG | HEIGHT: 64 IN | WEIGHT: 202 LBS | BODY MASS INDEX: 34.49 KG/M2 | DIASTOLIC BLOOD PRESSURE: 66 MMHG

## 2024-08-01 DIAGNOSIS — K50.90 CROHN'S DISEASE, UNSPECIFIED, W/OUT COMPLICATIONS: ICD-10-CM

## 2024-08-01 PROCEDURE — 99213 OFFICE O/P EST LOW 20 MIN: CPT

## 2024-08-02 NOTE — PHYSICAL EXAM
[Alert] : alert [Normal Voice/Communication] : normal voice/communication [Healthy Appearing] : healthy appearing [No Acute Distress] : no acute distress [Sclera] : the sclera and conjunctiva were normal [Hearing Threshold Finger Rub Not [place]] : hearing was normal [Normal Lips/Gums] : the lips and gums were normal [Normal Appearance] : the appearance of the neck was normal [Oropharynx] : the oropharynx was normal [No Neck Mass] : no neck mass was observed [No Respiratory Distress] : no respiratory distress [No Acc Muscle Use] : no accessory muscle use [Respiration, Rhythm And Depth] : normal respiratory rhythm and effort [Auscultation Breath Sounds / Voice Sounds] : lungs were clear to auscultation bilaterally [Heart Rate And Rhythm] : heart rate was normal and rhythm regular [Normal S1, S2] : normal S1 and S2 [Murmurs] : no murmurs [Bowel Sounds] : normal bowel sounds [Abdomen Tenderness] : non-tender [No Masses] : no abdominal mass palpated [Abdomen Soft] : soft [] : no hepatosplenomegaly [Oriented To Time, Place, And Person] : oriented to person, place, and time

## 2024-08-02 NOTE — ASSESSMENT
[FreeTextEntry1] : 69yo female with Crohn's disease  she is stable and feeling ok at this time will need to review records - she had colonoscopy expect we will need to consider biologic therapy given her description of her hospital course

## 2024-08-02 NOTE — HISTORY OF PRESENT ILLNESS
[FreeTextEntry1] : 67yo female f/u Crohn's disease  she had recent admission for 3 weeks to [facility] She was bleeding - ?from bladder and GI tract She has been home for 2 weeks and had increased h/h as outpt and getting blood checked again next week She forgot records at home She has been feeling better overall

## 2024-08-02 NOTE — PHYSICAL EXAM
[Alert] : alert [Normal Voice/Communication] : normal voice/communication [Healthy Appearing] : healthy appearing [No Acute Distress] : no acute distress [Sclera] : the sclera and conjunctiva were normal [Hearing Threshold Finger Rub Not [place]] : hearing was normal [Normal Lips/Gums] : the lips and gums were normal [Normal Appearance] : the appearance of the neck was normal [Oropharynx] : the oropharynx was normal [No Neck Mass] : no neck mass was observed [No Respiratory Distress] : no respiratory distress [No Acc Muscle Use] : no accessory muscle use [Respiration, Rhythm And Depth] : normal respiratory rhythm and effort [Auscultation Breath Sounds / Voice Sounds] : lungs were clear to auscultation bilaterally [Heart Rate And Rhythm] : heart rate was normal and rhythm regular [Normal S1, S2] : normal S1 and S2 [Murmurs] : no murmurs [Bowel Sounds] : normal bowel sounds [Abdomen Tenderness] : non-tender [No Masses] : no abdominal mass palpated [Abdomen Soft] : soft [] : no hepatosplenomegaly [Oriented To Time, Place, And Person] : oriented to person, place, and time

## 2024-08-02 NOTE — ASSESSMENT
[FreeTextEntry1] : 67yo female with Crohn's disease  she is stable and feeling ok at this time will need to review records - she had colonoscopy expect we will need to consider biologic therapy given her description of her hospital course

## 2024-08-02 NOTE — HISTORY OF PRESENT ILLNESS
[FreeTextEntry1] : 67yo female f/u Crohn's disease  she had recent admission for 3 weeks to [facility] She was bleeding - ?from bladder and GI tract She has been home for 2 weeks and had increased h/h as outpt and getting blood checked again next week She forgot records at home She has been feeling better overall

## 2024-08-05 ENCOUNTER — APPOINTMENT (OUTPATIENT)
Dept: NEUROLOGY | Facility: CLINIC | Age: 69
End: 2024-08-05

## 2024-08-05 PROCEDURE — G2211 COMPLEX E/M VISIT ADD ON: CPT

## 2024-08-05 PROCEDURE — 99215 OFFICE O/P EST HI 40 MIN: CPT

## 2024-08-05 RX ORDER — VIBEGRON 75 MG/1
75 TABLET, FILM COATED ORAL
Qty: 90 | Refills: 0 | Status: ACTIVE | COMMUNITY
Start: 2024-05-21

## 2024-08-05 RX ORDER — SEMAGLUTIDE 0.68 MG/ML
2 INJECTION, SOLUTION SUBCUTANEOUS
Qty: 9 | Refills: 0 | Status: ACTIVE | COMMUNITY
Start: 2024-02-02

## 2024-08-05 RX ORDER — FLUNISOLIDE 0.25 MG/ML
25 MCG/ACT SOLUTION NASAL
Qty: 25 | Refills: 0 | Status: ACTIVE | COMMUNITY
Start: 2024-07-18

## 2024-08-05 RX ORDER — TIRZEPATIDE 5 MG/.5ML
5 INJECTION, SOLUTION SUBCUTANEOUS
Qty: 6 | Refills: 0 | Status: ACTIVE | COMMUNITY
Start: 2024-07-02

## 2024-08-05 RX ORDER — DARBEPOETIN ALFA 200 UG/.4ML
200 INJECTION, SOLUTION INTRAVENOUS; SUBCUTANEOUS
Qty: 1 | Refills: 0 | Status: ACTIVE | COMMUNITY
Start: 2024-03-28

## 2024-08-05 RX ORDER — PEN NEEDLE, DIABETIC 32GX 5/32"
32G X 4 MM NEEDLE, DISPOSABLE MISCELLANEOUS
Qty: 500 | Refills: 0 | Status: ACTIVE | COMMUNITY
Start: 2024-07-03

## 2024-08-05 NOTE — ASSESSMENT
[FreeTextEntry1] : Maria A García is a 67YO female, returning today for follow-up after I had sent her to the ED last visit for likely sepsis.  Patient was treated at [facility] for sepsis, anemia secondary to abdominal internal bleeding, and while admitted patient was given for blood transfusions for anemia.  She also suffered 2 seizures during her 3-week stay in the hospital.  Patient was also found to be in A-fib and was started on Eliquis 5 mg twice daily.  Since the patient has resumed Keppra and Eliquis she has had no seizure activity and denies any chest pain or palpitations at this time.  Patient reports compliance with all medications since being discharged and is following up with several of her doctors today.  Patient does report return of rectal bleeding as of 2 days ago, bg red blood.  Patient is seeing GI immediately after today's appointment.  She reports having more energy and not feeling as fatigued as she did last time, headaches and numbness have improved.  Neurological exam is unremarkable.  Plan: - 48-hour ambulatory EEG to follow-up on seizure activity that occurred during hospitalization.   - Suspect that seizures were due to noncompliance with seizure medications for several days and worsening health at that time.  Will obtain a Keppra level to evaluate for compliance and adjust medications if needed after that has resulted. -Should there be seizure activity found in the 48-hour EEG, I discussed with the patient that I would recommend she follow-up with Dr. Barroso our epileptologist in the office.

## 2024-08-05 NOTE — HISTORY OF PRESENT ILLNESS
[FreeTextEntry1] : Today 8/5/24: She went to ED as instructed after last visit, went to [facility], she reports missing doses of seizure medications, and then they skipped a few day in the ED, had 2 seizures in the hospital, and 4 blood transfusions for abdominal bleeding. Was in the ICU, intubated for 2 days, in the hospital at [facility] for 3 weeks total.  She reports the discharge instructions were to have an ambulatory EEG outpatient. She also went into a-fib in the hospital, started on Eliquis 5mg BID.  She reports she is home 2 weeks now and feels very nervous but has been compliant with all medications thus far.  Currently on Keppra 750mg BID for seizure.  Seeing GI today right after this appointment for return of rectal bleeding that began again 2 days ago. She has not been driving for fear of having another seizure.  No new issues or complaints at this time.     7/8/24: Maria A García is a 67YO female, presenting today for follow-up with complaint of tremors. Patient previously seen by Dr Agosto for depression, possible seizures, falls and memory impairment. On oxcarbazepine 300mg BID and duloxetine 30mg nightly. She reports numbness in the lips, has a R ear implant. Headaches have recently become daily. Sleeps about 1 hour but then wakes with a pounding headache, able to go back to sleep for about 6 hours per night total. She does take oxycodone as needed for pain, reports taking about 5 pills per month.  Lips are numb and intermittently painful, like someone putting little needles in her bottom lip. She reports no change since hospitalization, even with oxcarbazepine or gabapentin use.  Hx of kidney stones.  Pt reports that she has LASHANDA, not currently using a CPAP. saw Surendra, did not do sleep study yet.   Patient reports that she was hospitalized several months ago for hernia repair and small intestine perforation.  She reports that she has a mesh that she knows is likely infected.  Reports calling the surgeon last week and was instructed that should she develop any symptoms other than pus coming out of the incision that she should return to the ED.  Patient has not yet return to ED for evaluation.  She reports she has had a poor appetite and bleeding with bowel movements. Wearing Dexcom, ranging 160-190s BGMs per the patient.   Shaking hands are due to anxiety per the patient, does not shake when calm and resting, only when nervous or talking about her health.

## 2024-08-05 NOTE — PHYSICAL EXAM
[FreeTextEntry1] : GENERAL PHYSICAL EXAM: GEN: no distress, normal affect EYES: sclera white, conjunctiva clear, no nystagmus PULM: no respiratory distress EXT: no edema, no cyanosis MSK: muscle tone and strength normal SKIN: warm, dry, no rash or lesion on exposed skin   NEUROLOGICAL EXAM: Mental Status Orientation: alert and oriented to person, place, time, and situation Language: clear and fluent, intact comprehension and repetition   Cranial Nerves II: visual fields full to confrontation III, IV, VI: PERRL, EOMI V, VII: facial sensation and movement intact and symmetric VIII: hearing intact IX, X: uvula midline, soft palate elevates normally XI: BL shoulder shrug intact XII: tongue midline   Motor Bilateral muscle strength 4/5 in UE and LE, proximally and distally Tone and bulk are normal in upper and lower limbs   Sensation Intact to light touch in all 4 EXTs   Coordination Normal FTN bilaterally   Gait Normal stance, stride, and pivot turn

## 2024-08-05 NOTE — ASSESSMENT
[FreeTextEntry1] : Maria A García is a 69YO female, returning today for follow-up after I had sent her to the ED last visit for likely sepsis.  Patient was treated at [facility] for sepsis, anemia secondary to abdominal internal bleeding, and while admitted patient was given for blood transfusions for anemia.  She also suffered 2 seizures during her 3-week stay in the hospital.  Patient was also found to be in A-fib and was started on Eliquis 5 mg twice daily.  Since the patient has resumed Keppra and Eliquis she has had no seizure activity and denies any chest pain or palpitations at this time.  Patient reports compliance with all medications since being discharged and is following up with several of her doctors today.  Patient does report return of rectal bleeding as of 2 days ago, bg red blood.  Patient is seeing GI immediately after today's appointment.  She reports having more energy and not feeling as fatigued as she did last time, headaches and numbness have improved.  Neurological exam is unremarkable.  Plan: - 48-hour ambulatory EEG to follow-up on seizure activity that occurred during hospitalization.   - Suspect that seizures were due to noncompliance with seizure medications for several days and worsening health at that time.  Will obtain a Keppra level to evaluate for compliance and adjust medications if needed after that has resulted. -Should there be seizure activity found in the 48-hour EEG, I discussed with the patient that I would recommend she follow-up with Dr. Barroso our epileptologist in the office.

## 2024-08-05 NOTE — HISTORY OF PRESENT ILLNESS
[FreeTextEntry1] : Today 8/5/24: She went to ED as instructed after last visit, went to [facility], she reports missing doses of seizure medications, and then they skipped a few day in the ED, had 2 seizures in the hospital, and 4 blood transfusions for abdominal bleeding. Was in the ICU, intubated for 2 days, in the hospital at [facility] for 3 weeks total.  She reports the discharge instructions were to have an ambulatory EEG outpatient. She also went into a-fib in the hospital, started on Eliquis 5mg BID.  She reports she is home 2 weeks now and feels very nervous but has been compliant with all medications thus far.  Currently on Keppra 750mg BID for seizure.  Seeing GI today right after this appointment for return of rectal bleeding that began again 2 days ago. She has not been driving for fear of having another seizure.  No new issues or complaints at this time.     7/8/24: Maria A García is a 69YO female, presenting today for follow-up with complaint of tremors. Patient previously seen by Dr Agosto for depression, possible seizures, falls and memory impairment. On oxcarbazepine 300mg BID and duloxetine 30mg nightly. She reports numbness in the lips, has a R ear implant. Headaches have recently become daily. Sleeps about 1 hour but then wakes with a pounding headache, able to go back to sleep for about 6 hours per night total. She does take oxycodone as needed for pain, reports taking about 5 pills per month.  Lips are numb and intermittently painful, like someone putting little needles in her bottom lip. She reports no change since hospitalization, even with oxcarbazepine or gabapentin use.  Hx of kidney stones.  Pt reports that she has LASHANDA, not currently using a CPAP. saw Surendra, did not do sleep study yet.   Patient reports that she was hospitalized several months ago for hernia repair and small intestine perforation.  She reports that she has a mesh that she knows is likely infected.  Reports calling the surgeon last week and was instructed that should she develop any symptoms other than pus coming out of the incision that she should return to the ED.  Patient has not yet return to ED for evaluation.  She reports she has had a poor appetite and bleeding with bowel movements. Wearing Dexcom, ranging 160-190s BGMs per the patient.   Shaking hands are due to anxiety per the patient, does not shake when calm and resting, only when nervous or talking about her health.

## 2024-08-12 ENCOUNTER — APPOINTMENT (OUTPATIENT)
Dept: GASTROENTEROLOGY | Facility: HOSPITAL | Age: 69
End: 2024-08-12

## 2024-08-13 ENCOUNTER — NON-APPOINTMENT (OUTPATIENT)
Age: 69
End: 2024-08-13

## 2024-08-13 ENCOUNTER — RX RENEWAL (OUTPATIENT)
Age: 69
End: 2024-08-13

## 2024-08-19 ENCOUNTER — APPOINTMENT (OUTPATIENT)
Dept: FAMILY MEDICINE | Facility: CLINIC | Age: 69
End: 2024-08-19

## 2024-08-19 ENCOUNTER — LABORATORY RESULT (OUTPATIENT)
Age: 69
End: 2024-08-19

## 2024-08-19 VITALS
HEART RATE: 101 BPM | SYSTOLIC BLOOD PRESSURE: 120 MMHG | OXYGEN SATURATION: 96 % | TEMPERATURE: 97 F | HEIGHT: 64 IN | DIASTOLIC BLOOD PRESSURE: 70 MMHG | WEIGHT: 193 LBS | BODY MASS INDEX: 32.95 KG/M2

## 2024-08-19 DIAGNOSIS — E11.9 TYPE 2 DIABETES MELLITUS W/OUT COMPLICATIONS: ICD-10-CM

## 2024-08-19 DIAGNOSIS — R56.9 UNSPECIFIED CONVULSIONS: ICD-10-CM

## 2024-08-19 DIAGNOSIS — D64.9 ANEMIA, UNSPECIFIED: ICD-10-CM

## 2024-08-19 DIAGNOSIS — G47.00 INSOMNIA, UNSPECIFIED: ICD-10-CM

## 2024-08-19 DIAGNOSIS — I48.91 UNSPECIFIED ATRIAL FIBRILLATION: ICD-10-CM

## 2024-08-19 LAB — HEMOGLOBIN: 9.1

## 2024-08-19 PROCEDURE — 99495 TRANSJ CARE MGMT MOD F2F 14D: CPT

## 2024-08-19 PROCEDURE — 85018 HEMOGLOBIN: CPT | Mod: QW

## 2024-08-20 LAB
ALBUMIN SERPL ELPH-MCNC: 4.1 G/DL
ALP BLD-CCNC: 74 U/L
ALT SERPL-CCNC: 10 U/L
ANION GAP SERPL CALC-SCNC: 11 MMOL/L
AST SERPL-CCNC: 9 U/L
BASOPHILS # BLD AUTO: 0.07 K/UL
BASOPHILS NFR BLD AUTO: 1.7 %
BILIRUB SERPL-MCNC: 0.7 MG/DL
BUN SERPL-MCNC: 16 MG/DL
CALCIUM SERPL-MCNC: 9.4 MG/DL
CHLORIDE SERPL-SCNC: 106 MMOL/L
CO2 SERPL-SCNC: 28 MMOL/L
CREAT SERPL-MCNC: 0.59 MG/DL
EGFR: 99 ML/MIN/1.73M2
EOSINOPHIL # BLD AUTO: 0 K/UL
EOSINOPHIL NFR BLD AUTO: 0 %
ESTIMATED AVERAGE GLUCOSE: 88 MG/DL
FERRITIN SERPL-MCNC: 873 NG/ML
FOLATE SERPL-MCNC: 5.4 NG/ML
GLUCOSE SERPL-MCNC: 185 MG/DL
HBA1C MFR BLD HPLC: 4.7 %
HCT VFR BLD CALC: 31 %
HGB BLD-MCNC: 9.1 G/DL
IRON SATN MFR SERPL: 38 %
IRON SERPL-MCNC: 100 UG/DL
LYMPHOCYTES # BLD AUTO: 1.18 K/UL
LYMPHOCYTES NFR BLD AUTO: 27 %
MAN DIFF?: NORMAL
MCHC RBC-ENTMCNC: 29.4 GM/DL
MCHC RBC-ENTMCNC: 32.6 PG
MCV RBC AUTO: 111.1 FL
MONOCYTES # BLD AUTO: 0.19 K/UL
MONOCYTES NFR BLD AUTO: 4.3 %
NEUTROPHILS # BLD AUTO: 2.84 K/UL
NEUTROPHILS NFR BLD AUTO: 65.2 %
PLATELET # BLD AUTO: 164 K/UL
POTASSIUM SERPL-SCNC: 4.1 MMOL/L
PROT SERPL-MCNC: 6.3 G/DL
RBC # BLD: 2.79 M/UL
RBC # FLD: 16.4 %
SODIUM SERPL-SCNC: 145 MMOL/L
TIBC SERPL-MCNC: 262 UG/DL
UIBC SERPL-MCNC: 162 UG/DL
VIT B12 SERPL-MCNC: 690 PG/ML
WBC # FLD AUTO: 4.36 K/UL

## 2024-08-23 ENCOUNTER — APPOINTMENT (OUTPATIENT)
Dept: GASTROENTEROLOGY | Facility: CLINIC | Age: 69
End: 2024-08-23
Payer: MEDICARE

## 2024-08-23 VITALS
WEIGHT: 194 LBS | BODY MASS INDEX: 33.12 KG/M2 | HEIGHT: 64 IN | DIASTOLIC BLOOD PRESSURE: 58 MMHG | SYSTOLIC BLOOD PRESSURE: 98 MMHG

## 2024-08-23 DIAGNOSIS — K62.5 HEMORRHAGE OF ANUS AND RECTUM: ICD-10-CM

## 2024-08-23 DIAGNOSIS — K50.90 CROHN'S DISEASE, UNSPECIFIED, W/OUT COMPLICATIONS: ICD-10-CM

## 2024-08-23 DIAGNOSIS — K52.9 NONINFECTIVE GASTROENTERITIS AND COLITIS, UNSPECIFIED: ICD-10-CM

## 2024-08-23 PROCEDURE — 99215 OFFICE O/P EST HI 40 MIN: CPT

## 2024-08-23 PROCEDURE — G2211 COMPLEX E/M VISIT ADD ON: CPT

## 2024-08-23 RX ORDER — PREDNISONE 20 MG/1
20 TABLET ORAL
Qty: 60 | Refills: 1 | Status: ACTIVE | COMMUNITY
Start: 2024-08-23 | End: 1900-01-01

## 2024-08-26 DIAGNOSIS — K51.211 ULCERATIVE (CHRONIC) PROCTITIS WITH RECTAL BLEEDING: ICD-10-CM

## 2024-08-26 PROBLEM — G47.00 INSOMNIA: Status: ACTIVE | Noted: 2024-08-26

## 2024-08-26 RX ORDER — DICYCLOMINE HYDROCHLORIDE 10 MG/1
10 CAPSULE ORAL
Qty: 90 | Refills: 0 | Status: ACTIVE | COMMUNITY
Start: 2024-08-26

## 2024-08-26 RX ORDER — RAMELTEON 8 MG/1
8 TABLET ORAL
Refills: 0 | Status: ACTIVE | COMMUNITY
Start: 2024-08-26

## 2024-08-26 RX ORDER — BUDESONIDE 3 MG/1
3 CAPSULE, COATED PELLETS ORAL
Refills: 0 | Status: ACTIVE | COMMUNITY
Start: 2024-08-26

## 2024-08-26 NOTE — ASSESSMENT
[FreeTextEntry1] : anemia, Crohn's - f/u with GI. recheck cbc, iron DM- check a1c, pt to make appt with endocrinologist. carb controlled diet advised.cont meds afib - refer to cardiology for eval. cont eliquis. monitor cbc hx seizures - recheck keppra levels. f/u neuro

## 2024-08-26 NOTE — PHYSICAL EXAM
[Soft] : abdomen soft [Normal] : affect was normal and insight and judgment were intact [62281 - Moderate Complexity requires multiple possible diagnoses and/or the management options, moderate complexity of the medical data (tests, etc.) to be reviewed, and moderate risk of significant complications, morbidity, and/or mortality as well as co] : Moderate Complexity [de-identified] : lower abdominal tenderness

## 2024-08-26 NOTE — HISTORY OF PRESENT ILLNESS
[Post-hospitalization from ___ Hospital] : Post-hospitalization from [unfilled] Hospital [Admitted on: ___] : The patient was admitted on [unfilled] [Discharged on ___] : discharged on [unfilled] [Patient Contacted By: ____] : and contacted by [unfilled] [Discharge Summary] : discharge summary [Med Reconciliation] : medication reconciliation has been completed [FreeTextEntry2] : Pt admitted for severe anemia, rectal bleeding. Patient was seen by GI, Labs drawn, IV fluids and rec'd 4 units of PRBCs for severe anemia hb was 7 on admission, increased to 9 after transfusion. Patient had a Colonoscopy with one vessel cauterized as per pt.  Pt feeling more fatigued since discharge. Pt still on eliquis due to afib w/ RVR in hospital Pt was started on budesonide 3mg 3 cap/day x 9 days then 2 caps/day for 2 weeks and 1 cap/day for 2 weeks, dicyclomine 10mg q 8 hrs prn abd pain, zofran 4mg q6 hrs prn, ramelteon 8mg qhs prn insomnia, duloxetine 30mg bid increased from once a day, and decreased atorvastatin to 10mg qhs, decreased gabapentin to 100mg qhs instead of TID, and lantus has been on hold due to hypoglycemia in hospital, and since glucose has been inresaed again in past day pt restarted lantus 40 units qhs.  Pt has appt w/ GI Dr. Davidson on 8/23/24.

## 2024-08-26 NOTE — PHYSICAL EXAM
[Soft] : abdomen soft [Normal] : affect was normal and insight and judgment were intact [12745 - Moderate Complexity requires multiple possible diagnoses and/or the management options, moderate complexity of the medical data (tests, etc.) to be reviewed, and moderate risk of significant complications, morbidity, and/or mortality as well as co] : Moderate Complexity [de-identified] : lower abdominal tenderness

## 2024-08-27 NOTE — HISTORY OF PRESENT ILLNESS
[FreeTextEntry1] : 67yo female with ulcerative colitis  She had another admission at [facility] She was readmitted for recurrent bleeding Records reviewed with repeat colonoscopy cautery and multiple transfusions She is on prednisone and budesonide and seems confused about her medications  She is still bleeding daily

## 2024-08-27 NOTE — ASSESSMENT
[FreeTextEntry1] : 67yo female with colitis, rectal bleeding  we reviewed medications in detail d/c budesonide increase prednisone to 40mg daily for now  will start infliximab reviewed with her in detail

## 2024-08-27 NOTE — PHYSICAL EXAM
[Alert] : alert [Normal Voice/Communication] : normal voice/communication [Healthy Appearing] : healthy appearing [No Acute Distress] : no acute distress [Sclera] : the sclera and conjunctiva were normal [Normal Lips/Gums] : the lips and gums were normal [Hearing Threshold Finger Rub Not [place]] : hearing was normal [Normal Appearance] : the appearance of the neck was normal [Oropharynx] : the oropharynx was normal [No Neck Mass] : no neck mass was observed [No Respiratory Distress] : no respiratory distress [No Acc Muscle Use] : no accessory muscle use [Respiration, Rhythm And Depth] : normal respiratory rhythm and effort [Auscultation Breath Sounds / Voice Sounds] : lungs were clear to auscultation bilaterally [Heart Rate And Rhythm] : heart rate was normal and rhythm regular [Normal S1, S2] : normal S1 and S2 [Murmurs] : no murmurs [Bowel Sounds] : normal bowel sounds [Abdomen Tenderness] : non-tender [No Masses] : no abdominal mass palpated [Abdomen Soft] : soft [] : no hepatosplenomegaly [Oriented To Time, Place, And Person] : oriented to person, place, and time

## 2024-08-27 NOTE — PHYSICAL EXAM
[Alert] : alert [Normal Voice/Communication] : normal voice/communication [Healthy Appearing] : healthy appearing [No Acute Distress] : no acute distress [Sclera] : the sclera and conjunctiva were normal [Normal Lips/Gums] : the lips and gums were normal [Hearing Threshold Finger Rub Not [place]] : hearing was normal [Normal Appearance] : the appearance of the neck was normal [Oropharynx] : the oropharynx was normal [No Neck Mass] : no neck mass was observed [No Respiratory Distress] : no respiratory distress [No Acc Muscle Use] : no accessory muscle use [Respiration, Rhythm And Depth] : normal respiratory rhythm and effort [Auscultation Breath Sounds / Voice Sounds] : lungs were clear to auscultation bilaterally [Heart Rate And Rhythm] : heart rate was normal and rhythm regular [Normal S1, S2] : normal S1 and S2 [Murmurs] : no murmurs [Abdomen Tenderness] : non-tender [Bowel Sounds] : normal bowel sounds [No Masses] : no abdominal mass palpated [] : no hepatosplenomegaly [Abdomen Soft] : soft [Oriented To Time, Place, And Person] : oriented to person, place, and time

## 2024-08-27 NOTE — HISTORY OF PRESENT ILLNESS
[FreeTextEntry1] : 67yo female with ulcerative colitis  She had another admission at [facility] She was readmitted for recurrent bleeding Records reviewed with repeat colonoscopy cautery and multiple transfusions She is on prednisone and budesonide and seems confused about her medications  She is still bleeding daily

## 2024-08-28 ENCOUNTER — RX RENEWAL (OUTPATIENT)
Age: 69
End: 2024-08-28

## 2024-08-28 DIAGNOSIS — K51.90 ULCERATIVE COLITIS, UNSPECIFIED, W/OUT COMPLICATIONS: ICD-10-CM

## 2024-08-28 RX ORDER — INFLIXIMAB 100 MG/10ML
100 INJECTION, POWDER, LYOPHILIZED, FOR SOLUTION INTRAVENOUS
Refills: 0 | Status: ACTIVE | OUTPATIENT
Start: 2024-08-23

## 2024-08-28 RX ORDER — DIPHENHYDRAMINE HYDROCHLORIDE 50 MG/ML
50 INJECTION, SOLUTION INTRAMUSCULAR; INTRAVENOUS
Refills: 0 | Status: ACTIVE | OUTPATIENT
Start: 2024-08-23

## 2024-08-28 RX ORDER — ACETAMINOPHEN 325 MG/1
325 TABLET ORAL
Refills: 0 | Status: ACTIVE | OUTPATIENT
Start: 2024-08-23

## 2024-09-01 LAB — LEVETIRACETAM SERPL-MCNC: 9.4 UG/ML

## 2024-09-03 ENCOUNTER — APPOINTMENT (OUTPATIENT)
Dept: CARDIOLOGY | Facility: CLINIC | Age: 69
End: 2024-09-03

## 2024-09-06 ENCOUNTER — RX RENEWAL (OUTPATIENT)
Age: 69
End: 2024-09-06

## 2024-09-12 ENCOUNTER — APPOINTMENT (OUTPATIENT)
Dept: FAMILY MEDICINE | Facility: CLINIC | Age: 69
End: 2024-09-12

## 2024-09-25 ENCOUNTER — APPOINTMENT (OUTPATIENT)
Dept: DERMATOLOGY | Facility: CLINIC | Age: 69
End: 2024-09-25

## 2024-10-07 ENCOUNTER — RX RENEWAL (OUTPATIENT)
Age: 69
End: 2024-10-07

## 2024-10-11 ENCOUNTER — APPOINTMENT (OUTPATIENT)
Dept: FAMILY MEDICINE | Facility: CLINIC | Age: 69
End: 2024-10-11
Payer: MEDICARE

## 2024-10-11 ENCOUNTER — APPOINTMENT (OUTPATIENT)
Dept: GASTROENTEROLOGY | Facility: CLINIC | Age: 69
End: 2024-10-11

## 2024-10-11 VITALS — BODY MASS INDEX: 33.63 KG/M2 | HEIGHT: 64 IN | WEIGHT: 197 LBS

## 2024-10-11 VITALS
HEART RATE: 74 BPM | WEIGHT: 191 LBS | TEMPERATURE: 97.7 F | BODY MASS INDEX: 32.61 KG/M2 | DIASTOLIC BLOOD PRESSURE: 54 MMHG | HEIGHT: 64 IN | SYSTOLIC BLOOD PRESSURE: 90 MMHG | OXYGEN SATURATION: 98 %

## 2024-10-11 DIAGNOSIS — K50.90 CROHN'S DISEASE, UNSPECIFIED, W/OUT COMPLICATIONS: ICD-10-CM

## 2024-10-11 DIAGNOSIS — F41.9 ANXIETY DISORDER, UNSPECIFIED: ICD-10-CM

## 2024-10-11 DIAGNOSIS — R19.7 DIARRHEA, UNSPECIFIED: ICD-10-CM

## 2024-10-11 DIAGNOSIS — D64.9 ANEMIA, UNSPECIFIED: ICD-10-CM

## 2024-10-11 DIAGNOSIS — I95.9 HYPOTENSION, UNSPECIFIED: ICD-10-CM

## 2024-10-11 DIAGNOSIS — K52.9 NONINFECTIVE GASTROENTERITIS AND COLITIS, UNSPECIFIED: ICD-10-CM

## 2024-10-11 PROCEDURE — 99212 OFFICE O/P EST SF 10 MIN: CPT

## 2024-10-11 PROCEDURE — 99214 OFFICE O/P EST MOD 30 MIN: CPT

## 2024-10-11 RX ORDER — VIBEGRON 75 MG/1
75 TABLET, FILM COATED ORAL
Refills: 0 | Status: ACTIVE | COMMUNITY

## 2024-10-11 RX ORDER — ALPRAZOLAM 0.25 MG/1
0.25 TABLET ORAL
Qty: 60 | Refills: 0 | Status: ACTIVE | COMMUNITY
Start: 2024-10-11 | End: 1900-01-01

## 2024-10-11 RX ORDER — ALBUTEROL 90 MCG
90 AEROSOL (GRAM) INHALATION
Refills: 0 | Status: ACTIVE | COMMUNITY

## 2024-10-11 RX ORDER — OXYCODONE 5 MG/1
5 TABLET ORAL
Refills: 0 | Status: ACTIVE | COMMUNITY

## 2024-10-11 RX ORDER — ONDANSETRON 4 MG/1
4 TABLET ORAL
Refills: 0 | Status: ACTIVE | COMMUNITY

## 2024-10-11 RX ORDER — LORATADINE 10 MG/1
10 TABLET ORAL
Refills: 0 | Status: ACTIVE | COMMUNITY

## 2024-10-11 RX ORDER — ATORVASTATIN CALCIUM 10 MG/1
10 TABLET, FILM COATED ORAL
Refills: 0 | Status: ACTIVE | COMMUNITY

## 2024-10-11 RX ORDER — LEVOTHYROXINE SODIUM 0.17 MG/1
TABLET ORAL
Refills: 0 | Status: ACTIVE | COMMUNITY

## 2024-10-11 RX ORDER — DIPHENHYDRAMINE HCL 25 MG/1
25 CAPSULE ORAL
Refills: 0 | Status: ACTIVE | OUTPATIENT
Start: 2024-10-11 | End: 1900-01-01

## 2024-10-11 RX ORDER — APIXABAN 5 MG/1
5 TABLET, FILM COATED ORAL
Refills: 0 | Status: ACTIVE | COMMUNITY

## 2024-10-11 RX ORDER — DICYCLOMINE HYDROCHLORIDE 10 MG/1
10 CAPSULE ORAL
Refills: 0 | Status: ACTIVE | COMMUNITY

## 2024-10-11 RX ORDER — INFLIXIMAB 100 MG/10ML
100 INJECTION, POWDER, LYOPHILIZED, FOR SOLUTION INTRAVENOUS
Refills: 0 | Status: ACTIVE | OUTPATIENT
Start: 2024-10-11

## 2024-10-11 RX ORDER — ACETAMINOPHEN 325 MG/1
325 TABLET ORAL
Refills: 0 | Status: ACTIVE | OUTPATIENT
Start: 2024-10-11

## 2024-10-11 RX ORDER — PNV NO.95/FERROUS FUM/FOLIC AC 28MG-0.8MG
TABLET ORAL
Refills: 0 | Status: ACTIVE | COMMUNITY

## 2024-10-11 RX ORDER — TAMSULOSIN HYDROCHLORIDE 0.4 MG/1
0.4 CAPSULE ORAL
Refills: 0 | Status: ACTIVE | COMMUNITY

## 2024-10-11 RX ADMIN — ACETAMINOPHEN 0 MG: 325 TABLET ORAL at 00:00

## 2024-10-11 RX ADMIN — DIPHENHYDRAMINE HCL 0 MG: 25 CAPSULE ORAL at 00:00

## 2024-10-11 RX ADMIN — INFLIXIMAB 0 MG: 100 INJECTION, POWDER, LYOPHILIZED, FOR SOLUTION INTRAVENOUS at 00:00

## 2024-10-14 ENCOUNTER — APPOINTMENT (OUTPATIENT)
Dept: RHEUMATOLOGY | Facility: CLINIC | Age: 69
End: 2024-10-14
Payer: MEDICARE

## 2024-10-14 ENCOUNTER — MED ADMIN CHARGE (OUTPATIENT)
Age: 69
End: 2024-10-14

## 2024-10-14 VITALS
SYSTOLIC BLOOD PRESSURE: 96 MMHG | HEART RATE: 80 BPM | TEMPERATURE: 97 F | OXYGEN SATURATION: 99 % | DIASTOLIC BLOOD PRESSURE: 68 MMHG

## 2024-10-14 PROCEDURE — 96415 CHEMO IV INFUSION ADDL HR: CPT

## 2024-10-14 PROCEDURE — 96413 CHEMO IV INFUSION 1 HR: CPT

## 2024-10-14 RX ORDER — ACETAMINOPHEN 325 MG/1
325 TABLET ORAL
Qty: 0 | Refills: 0 | Status: COMPLETED
Start: 2024-08-23

## 2024-10-14 RX ORDER — DIPHENHYDRAMINE HCL 25 MG/1
25 CAPSULE ORAL
Qty: 0 | Refills: 0 | Status: COMPLETED
Start: 2024-10-11

## 2024-10-16 ENCOUNTER — NON-APPOINTMENT (OUTPATIENT)
Age: 69
End: 2024-10-16

## 2024-10-17 ENCOUNTER — APPOINTMENT (OUTPATIENT)
Dept: GASTROENTEROLOGY | Facility: CLINIC | Age: 69
End: 2024-10-17
Payer: MEDICARE

## 2024-10-17 DIAGNOSIS — R10.9 UNSPECIFIED ABDOMINAL PAIN: ICD-10-CM

## 2024-10-17 DIAGNOSIS — K62.5 HEMORRHAGE OF ANUS AND RECTUM: ICD-10-CM

## 2024-10-17 DIAGNOSIS — K51.211 ULCERATIVE (CHRONIC) PROCTITIS WITH RECTAL BLEEDING: ICD-10-CM

## 2024-10-17 PROCEDURE — 99214 OFFICE O/P EST MOD 30 MIN: CPT

## 2024-10-28 ENCOUNTER — APPOINTMENT (OUTPATIENT)
Dept: RHEUMATOLOGY | Facility: CLINIC | Age: 69
End: 2024-10-28

## 2024-10-29 ENCOUNTER — NON-APPOINTMENT (OUTPATIENT)
Age: 69
End: 2024-10-29

## 2024-11-01 ENCOUNTER — APPOINTMENT (OUTPATIENT)
Dept: RHEUMATOLOGY | Facility: CLINIC | Age: 69
End: 2024-11-01

## 2024-11-05 ENCOUNTER — RX RENEWAL (OUTPATIENT)
Age: 69
End: 2024-11-05

## 2024-11-08 ENCOUNTER — APPOINTMENT (OUTPATIENT)
Dept: RHEUMATOLOGY | Facility: CLINIC | Age: 69
End: 2024-11-08

## 2024-11-14 ENCOUNTER — APPOINTMENT (OUTPATIENT)
Dept: GASTROENTEROLOGY | Facility: CLINIC | Age: 69
End: 2024-11-14

## 2024-11-15 ENCOUNTER — NON-APPOINTMENT (OUTPATIENT)
Age: 69
End: 2024-11-15

## 2024-11-22 ENCOUNTER — APPOINTMENT (OUTPATIENT)
Dept: FAMILY MEDICINE | Facility: CLINIC | Age: 69
End: 2024-11-22

## 2024-12-31 ENCOUNTER — RX RENEWAL (OUTPATIENT)
Age: 69
End: 2024-12-31

## 2025-01-06 ENCOUNTER — APPOINTMENT (OUTPATIENT)
Dept: NEUROLOGY | Facility: CLINIC | Age: 70
End: 2025-01-06
Payer: MEDICARE

## 2025-01-06 VITALS
HEIGHT: 64 IN | SYSTOLIC BLOOD PRESSURE: 91 MMHG | HEART RATE: 83 BPM | BODY MASS INDEX: 30.39 KG/M2 | WEIGHT: 178 LBS | DIASTOLIC BLOOD PRESSURE: 60 MMHG

## 2025-01-06 DIAGNOSIS — R56.9 UNSPECIFIED CONVULSIONS: ICD-10-CM

## 2025-01-06 DIAGNOSIS — R25.1 TREMOR, UNSPECIFIED: ICD-10-CM

## 2025-01-06 PROCEDURE — G2211 COMPLEX E/M VISIT ADD ON: CPT

## 2025-01-06 PROCEDURE — 99214 OFFICE O/P EST MOD 30 MIN: CPT

## 2025-01-06 PROCEDURE — 95816 EEG AWAKE AND DROWSY: CPT

## 2025-01-06 RX ORDER — PANTOPRAZOLE SODIUM 40 MG/1
40 GRANULE, DELAYED RELEASE ORAL
Refills: 0 | Status: ACTIVE | COMMUNITY

## 2025-01-06 RX ORDER — LEVOTHYROXINE SODIUM 200 UG/1
200 TABLET ORAL
Refills: 0 | Status: ACTIVE | COMMUNITY

## 2025-01-06 RX ORDER — TAMSULOSIN HCL 0.4 MG
0.4 CAPSULE ORAL
Refills: 0 | Status: ACTIVE | COMMUNITY

## 2025-01-10 ENCOUNTER — NON-APPOINTMENT (OUTPATIENT)
Age: 70
End: 2025-01-10

## 2025-01-10 ENCOUNTER — APPOINTMENT (OUTPATIENT)
Dept: GASTROENTEROLOGY | Facility: CLINIC | Age: 70
End: 2025-01-10
Payer: MEDICARE

## 2025-01-10 VITALS
HEIGHT: 64 IN | WEIGHT: 178 LBS | SYSTOLIC BLOOD PRESSURE: 112 MMHG | DIASTOLIC BLOOD PRESSURE: 64 MMHG | BODY MASS INDEX: 30.39 KG/M2

## 2025-01-10 DIAGNOSIS — K51.90 ULCERATIVE COLITIS, UNSPECIFIED, W/OUT COMPLICATIONS: ICD-10-CM

## 2025-01-10 PROCEDURE — 99213 OFFICE O/P EST LOW 20 MIN: CPT

## 2025-01-27 ENCOUNTER — RX RENEWAL (OUTPATIENT)
Age: 70
End: 2025-01-27

## 2025-02-06 ENCOUNTER — APPOINTMENT (OUTPATIENT)
Dept: RHEUMATOLOGY | Facility: CLINIC | Age: 70
End: 2025-02-06
Payer: MEDICARE

## 2025-02-06 ENCOUNTER — MED ADMIN CHARGE (OUTPATIENT)
Age: 70
End: 2025-02-06

## 2025-02-06 ENCOUNTER — NON-APPOINTMENT (OUTPATIENT)
Age: 70
End: 2025-02-06

## 2025-02-06 VITALS
TEMPERATURE: 98 F | HEART RATE: 76 BPM | RESPIRATION RATE: 17 BRPM | SYSTOLIC BLOOD PRESSURE: 97 MMHG | OXYGEN SATURATION: 98 % | DIASTOLIC BLOOD PRESSURE: 64 MMHG

## 2025-02-06 VITALS
OXYGEN SATURATION: 98 % | TEMPERATURE: 97.6 F | HEART RATE: 79 BPM | DIASTOLIC BLOOD PRESSURE: 56 MMHG | SYSTOLIC BLOOD PRESSURE: 93 MMHG

## 2025-02-06 PROCEDURE — 96413 CHEMO IV INFUSION 1 HR: CPT

## 2025-02-06 PROCEDURE — 96415 CHEMO IV INFUSION ADDL HR: CPT

## 2025-02-07 RX ORDER — INFLIXIMAB 100 MG/10ML
100 INJECTION, POWDER, LYOPHILIZED, FOR SOLUTION INTRAVENOUS
Qty: 1 | Refills: 0 | Status: COMPLETED
Start: 2024-10-11

## 2025-02-19 ENCOUNTER — EMERGENCY (EMERGENCY)
Facility: HOSPITAL | Age: 70
LOS: 0 days | Discharge: HOME | End: 2025-02-19
Attending: STUDENT IN AN ORGANIZED HEALTH CARE EDUCATION/TRAINING PROGRAM
Payer: MEDICARE

## 2025-02-19 ENCOUNTER — APPOINTMENT (OUTPATIENT)
Dept: RHEUMATOLOGY | Facility: CLINIC | Age: 70
End: 2025-02-19
Payer: MEDICARE

## 2025-02-19 ENCOUNTER — MED ADMIN CHARGE (OUTPATIENT)
Age: 70
End: 2025-02-19

## 2025-02-19 VITALS
TEMPERATURE: 98 F | SYSTOLIC BLOOD PRESSURE: 82 MMHG | DIASTOLIC BLOOD PRESSURE: 52 MMHG | RESPIRATION RATE: 14 BRPM | HEART RATE: 75 BPM | OXYGEN SATURATION: 98 %

## 2025-02-19 VITALS
RESPIRATION RATE: 15 BRPM | HEART RATE: 73 BPM | DIASTOLIC BLOOD PRESSURE: 51 MMHG | OXYGEN SATURATION: 98 % | TEMPERATURE: 98 F | SYSTOLIC BLOOD PRESSURE: 87 MMHG

## 2025-02-19 VITALS — WEIGHT: 184.97 LBS | HEIGHT: 64 IN

## 2025-02-19 VITALS
TEMPERATURE: 98 F | SYSTOLIC BLOOD PRESSURE: 96 MMHG | RESPIRATION RATE: 14 BRPM | DIASTOLIC BLOOD PRESSURE: 57 MMHG | OXYGEN SATURATION: 98 % | HEART RATE: 74 BPM

## 2025-02-19 VITALS — SYSTOLIC BLOOD PRESSURE: 80 MMHG | DIASTOLIC BLOOD PRESSURE: 40 MMHG

## 2025-02-19 VITALS
DIASTOLIC BLOOD PRESSURE: 67 MMHG | SYSTOLIC BLOOD PRESSURE: 95 MMHG | TEMPERATURE: 97.8 F | HEART RATE: 76 BPM | OXYGEN SATURATION: 100 %

## 2025-02-19 DIAGNOSIS — I95.9 HYPOTENSION, UNSPECIFIED: ICD-10-CM

## 2025-02-19 DIAGNOSIS — R10.31 RIGHT LOWER QUADRANT PAIN: ICD-10-CM

## 2025-02-19 DIAGNOSIS — Z98.84 BARIATRIC SURGERY STATUS: Chronic | ICD-10-CM

## 2025-02-19 DIAGNOSIS — Z88.8 ALLERGY STATUS TO OTHER DRUGS, MEDICAMENTS AND BIOLOGICAL SUBSTANCES: ICD-10-CM

## 2025-02-19 DIAGNOSIS — Z86.69 PERSONAL HISTORY OF OTHER DISEASES OF THE NERVOUS SYSTEM AND SENSE ORGANS: ICD-10-CM

## 2025-02-19 DIAGNOSIS — Z98.89 OTHER SPECIFIED POSTPROCEDURAL STATES: Chronic | ICD-10-CM

## 2025-02-19 DIAGNOSIS — L02.92 FURUNCLE, UNSPECIFIED: Chronic | ICD-10-CM

## 2025-02-19 DIAGNOSIS — K46.9 UNSPECIFIED ABDOMINAL HERNIA WITHOUT OBSTRUCTION OR GANGRENE: Chronic | ICD-10-CM

## 2025-02-19 DIAGNOSIS — M75.120 COMPLETE ROTATOR CUFF TEAR OR RUPTURE OF UNSPECIFIED SHOULDER, NOT SPECIFIED AS TRAUMATIC: Chronic | ICD-10-CM

## 2025-02-19 DIAGNOSIS — K21.9 GASTRO-ESOPHAGEAL REFLUX DISEASE WITHOUT ESOPHAGITIS: ICD-10-CM

## 2025-02-19 DIAGNOSIS — Z41.1 ENCOUNTER FOR COSMETIC SURGERY: Chronic | ICD-10-CM

## 2025-02-19 DIAGNOSIS — K50.90 CROHN'S DISEASE, UNSPECIFIED, WITHOUT COMPLICATIONS: ICD-10-CM

## 2025-02-19 DIAGNOSIS — Z90.49 ACQUIRED ABSENCE OF OTHER SPECIFIED PARTS OF DIGESTIVE TRACT: Chronic | ICD-10-CM

## 2025-02-19 DIAGNOSIS — F32.A DEPRESSION, UNSPECIFIED: ICD-10-CM

## 2025-02-19 DIAGNOSIS — H26.9 UNSPECIFIED CATARACT: ICD-10-CM

## 2025-02-19 DIAGNOSIS — Z88.5 ALLERGY STATUS TO NARCOTIC AGENT: ICD-10-CM

## 2025-02-19 DIAGNOSIS — G89.29 OTHER CHRONIC PAIN: ICD-10-CM

## 2025-02-19 DIAGNOSIS — E83.42 HYPOMAGNESEMIA: ICD-10-CM

## 2025-02-19 DIAGNOSIS — Z96.653 PRESENCE OF ARTIFICIAL KNEE JOINT, BILATERAL: Chronic | ICD-10-CM

## 2025-02-19 DIAGNOSIS — I48.91 UNSPECIFIED ATRIAL FIBRILLATION: ICD-10-CM

## 2025-02-19 DIAGNOSIS — R10.32 LEFT LOWER QUADRANT PAIN: ICD-10-CM

## 2025-02-19 DIAGNOSIS — Z86.2 PERSONAL HISTORY OF DISEASES OF THE BLOOD AND BLOOD-FORMING ORGANS AND CERTAIN DISORDERS INVOLVING THE IMMUNE MECHANISM: ICD-10-CM

## 2025-02-19 LAB
ALBUMIN SERPL ELPH-MCNC: 3.2 G/DL — LOW (ref 3.3–5)
ALP SERPL-CCNC: 87 U/L — SIGNIFICANT CHANGE UP (ref 40–120)
ALT FLD-CCNC: 35 U/L — SIGNIFICANT CHANGE UP (ref 12–78)
ANION GAP SERPL CALC-SCNC: 5 MMOL/L — SIGNIFICANT CHANGE UP (ref 5–17)
AST SERPL-CCNC: 11 U/L — LOW (ref 15–37)
BASOPHILS # BLD AUTO: 0.03 K/UL — SIGNIFICANT CHANGE UP (ref 0–0.2)
BASOPHILS NFR BLD AUTO: 0.7 % — SIGNIFICANT CHANGE UP (ref 0–2)
BILIRUB SERPL-MCNC: 0.3 MG/DL — SIGNIFICANT CHANGE UP (ref 0.2–1.2)
BUN SERPL-MCNC: 17 MG/DL — SIGNIFICANT CHANGE UP (ref 7–23)
CALCIUM SERPL-MCNC: 8.4 MG/DL — LOW (ref 8.5–10.1)
CHLORIDE SERPL-SCNC: 112 MMOL/L — HIGH (ref 96–108)
CO2 SERPL-SCNC: 26 MMOL/L — SIGNIFICANT CHANGE UP (ref 22–31)
CREAT SERPL-MCNC: 0.52 MG/DL — SIGNIFICANT CHANGE UP (ref 0.5–1.3)
EGFR: 102 ML/MIN/1.73M2 — SIGNIFICANT CHANGE UP
EOSINOPHIL # BLD AUTO: 0.03 K/UL — SIGNIFICANT CHANGE UP (ref 0–0.5)
EOSINOPHIL NFR BLD AUTO: 0.7 % — SIGNIFICANT CHANGE UP (ref 0–6)
GLUCOSE SERPL-MCNC: 101 MG/DL — HIGH (ref 70–99)
HCT VFR BLD CALC: 29.6 % — LOW (ref 34.5–45)
HGB BLD-MCNC: 9.1 G/DL — LOW (ref 11.5–15.5)
HYPOCHROMIA BLD QL: SLIGHT — SIGNIFICANT CHANGE UP
IMM GRANULOCYTES # BLD AUTO: 0.07 K/UL — SIGNIFICANT CHANGE UP (ref 0–0.07)
IMM GRANULOCYTES NFR BLD AUTO: 1.6 % — HIGH (ref 0–0.9)
LYMPHOCYTES # BLD AUTO: 1.62 K/UL — SIGNIFICANT CHANGE UP (ref 1–3.3)
LYMPHOCYTES NFR BLD AUTO: 35.9 % — SIGNIFICANT CHANGE UP (ref 13–44)
MACROCYTES BLD QL: ABNORMAL
MAGNESIUM SERPL-MCNC: 1.3 MG/DL — LOW (ref 1.6–2.6)
MANUAL SMEAR VERIFICATION: SIGNIFICANT CHANGE UP
MCHC RBC-ENTMCNC: 30.7 G/DL — LOW (ref 32–36)
MCHC RBC-ENTMCNC: 34 PG — SIGNIFICANT CHANGE UP (ref 27–34)
MCV RBC AUTO: 110.4 FL — HIGH (ref 80–100)
MONOCYTES # BLD AUTO: 0.42 K/UL — SIGNIFICANT CHANGE UP (ref 0–0.9)
MONOCYTES NFR BLD AUTO: 9.3 % — SIGNIFICANT CHANGE UP (ref 2–14)
NEUTROPHILS # BLD AUTO: 2.34 K/UL — SIGNIFICANT CHANGE UP (ref 1.8–7.4)
NEUTROPHILS NFR BLD AUTO: 51.8 % — SIGNIFICANT CHANGE UP (ref 43–77)
NRBC # BLD AUTO: 0 K/UL — SIGNIFICANT CHANGE UP (ref 0–0)
NRBC # FLD: 0 K/UL — SIGNIFICANT CHANGE UP (ref 0–0)
NRBC BLD AUTO-RTO: 0 /100 WBCS — SIGNIFICANT CHANGE UP (ref 0–0)
NT-PROBNP SERPL-SCNC: 174 PG/ML — HIGH (ref 0–125)
PLAT MORPH BLD: NORMAL — SIGNIFICANT CHANGE UP
PLATELET # BLD AUTO: 117 K/UL — LOW (ref 150–400)
PMV BLD: 11.2 FL — SIGNIFICANT CHANGE UP (ref 7–13)
POTASSIUM SERPL-MCNC: 3.2 MMOL/L — LOW (ref 3.5–5.3)
POTASSIUM SERPL-SCNC: 3.2 MMOL/L — LOW (ref 3.5–5.3)
PROT SERPL-MCNC: 6 GM/DL — SIGNIFICANT CHANGE UP (ref 6–8.3)
RBC # BLD: 2.68 M/UL — LOW (ref 3.8–5.2)
RBC # FLD: 15.7 % — HIGH (ref 10.3–14.5)
RBC BLD AUTO: ABNORMAL
SODIUM SERPL-SCNC: 143 MMOL/L — SIGNIFICANT CHANGE UP (ref 135–145)
TROPONIN I, HIGH SENSITIVITY RESULT: 11.51 NG/L — SIGNIFICANT CHANGE UP
WBC # BLD: 4.51 K/UL — SIGNIFICANT CHANGE UP (ref 3.8–10.5)
WBC # FLD AUTO: 4.51 K/UL — SIGNIFICANT CHANGE UP (ref 3.8–10.5)
WBC MORPHOLOGY: NORMAL — SIGNIFICANT CHANGE UP

## 2025-02-19 PROCEDURE — 74177 CT ABD & PELVIS W/CONTRAST: CPT | Mod: 26

## 2025-02-19 PROCEDURE — 96374 THER/PROPH/DIAG INJ IV PUSH: CPT | Mod: XU

## 2025-02-19 PROCEDURE — 73060 X-RAY EXAM OF HUMERUS: CPT | Mod: 26,RT

## 2025-02-19 PROCEDURE — 73060 X-RAY EXAM OF HUMERUS: CPT | Mod: RT

## 2025-02-19 PROCEDURE — 93010 ELECTROCARDIOGRAM REPORT: CPT | Mod: 76

## 2025-02-19 PROCEDURE — 71045 X-RAY EXAM CHEST 1 VIEW: CPT | Mod: 26

## 2025-02-19 PROCEDURE — 93926 LOWER EXTREMITY STUDY: CPT | Mod: RT

## 2025-02-19 PROCEDURE — 85025 COMPLETE CBC W/AUTO DIFF WBC: CPT

## 2025-02-19 PROCEDURE — 80053 COMPREHEN METABOLIC PANEL: CPT

## 2025-02-19 PROCEDURE — 93926 LOWER EXTREMITY STUDY: CPT | Mod: 26,RT

## 2025-02-19 PROCEDURE — 71045 X-RAY EXAM CHEST 1 VIEW: CPT

## 2025-02-19 PROCEDURE — 84484 ASSAY OF TROPONIN QUANT: CPT

## 2025-02-19 PROCEDURE — 96413 CHEMO IV INFUSION 1 HR: CPT

## 2025-02-19 PROCEDURE — 99285 EMERGENCY DEPT VISIT HI MDM: CPT | Mod: 25

## 2025-02-19 PROCEDURE — 93005 ELECTROCARDIOGRAM TRACING: CPT

## 2025-02-19 PROCEDURE — 83735 ASSAY OF MAGNESIUM: CPT

## 2025-02-19 PROCEDURE — 96375 TX/PRO/DX INJ NEW DRUG ADDON: CPT

## 2025-02-19 PROCEDURE — 36415 COLL VENOUS BLD VENIPUNCTURE: CPT

## 2025-02-19 PROCEDURE — 99285 EMERGENCY DEPT VISIT HI MDM: CPT

## 2025-02-19 PROCEDURE — 96415 CHEMO IV INFUSION ADDL HR: CPT

## 2025-02-19 PROCEDURE — 74177 CT ABD & PELVIS W/CONTRAST: CPT | Mod: MC

## 2025-02-19 PROCEDURE — 83880 ASSAY OF NATRIURETIC PEPTIDE: CPT

## 2025-02-19 RX ORDER — ACETAMINOPHEN 325 MG/1
325 TABLET ORAL
Qty: 0 | Refills: 0 | Status: COMPLETED
Start: 2024-10-11

## 2025-02-19 RX ORDER — DIPHENHYDRAMINE HCL 25 MG/1
25 CAPSULE ORAL
Qty: 0 | Refills: 0 | Status: COMPLETED
Start: 2024-10-11

## 2025-02-19 RX ORDER — POTASSIUM CHLORIDE 750 MG/1
40 TABLET, EXTENDED RELEASE ORAL ONCE
Refills: 0 | Status: COMPLETED | OUTPATIENT
Start: 2025-02-19 | End: 2025-02-19

## 2025-02-19 RX ORDER — ACETAMINOPHEN 160 MG/5ML
1000 SUSPENSION ORAL ONCE
Refills: 0 | Status: COMPLETED | OUTPATIENT
Start: 2025-02-19 | End: 2025-02-19

## 2025-02-19 RX ORDER — MIDODRINE HYDROCHLORIDE 5 MG/1
2.5 TABLET ORAL ONCE
Refills: 0 | Status: COMPLETED | OUTPATIENT
Start: 2025-02-19 | End: 2025-02-19

## 2025-02-19 RX ORDER — BACTERIOSTATIC SODIUM CHLORIDE 0.9 %
1000 VIAL (ML) INJECTION ONCE
Refills: 0 | Status: COMPLETED | OUTPATIENT
Start: 2025-02-19 | End: 2025-02-19

## 2025-02-19 RX ORDER — MAGNESIUM SULFATE 0.8 MEQ/ML
2 AMPUL (ML) INJECTION ONCE
Refills: 0 | Status: COMPLETED | OUTPATIENT
Start: 2025-02-19 | End: 2025-02-19

## 2025-02-19 RX ADMIN — MIDODRINE HYDROCHLORIDE 2.5 MILLIGRAM(S): 5 TABLET ORAL at 20:23

## 2025-02-19 RX ADMIN — Medication 25 GRAM(S): at 17:44

## 2025-02-19 RX ADMIN — ACETAMINOPHEN 400 MILLIGRAM(S): 160 SUSPENSION ORAL at 17:39

## 2025-02-19 RX ADMIN — POTASSIUM CHLORIDE 40 MILLIEQUIVALENT(S): 750 TABLET, EXTENDED RELEASE ORAL at 17:44

## 2025-02-19 RX ADMIN — Medication 1000 MILLILITER(S): at 17:21

## 2025-02-19 NOTE — ED PROVIDER NOTE - PROGRESS NOTE DETAILS
PA: Patient evaluated at bedside. Will get labs, CT, IVF. Reassess. ~Antonino Gipson PA-C Twelve-lead sinus rhythm no ischemic changes.  No T wave inversions.  No ST changes. chandni - ct with possible pseudoaneurysm? pt did have ablation 2 weeks ago for afib. they went through the [facility] groin. will send for arterial duplex.     pt noted to be intermitted aflutter on telemetry. always goes back to sinus. asymptomatic. vss. bp stable. she is on blood thinner (eliqusi)    work up otherwise neg for acute pathology    dispo pending sono result spoke with vascular surgery resident, will see patient in ED. ~Antonino Gipson PA-C PA note: Patient seen by vascular surgery team, ok to dc home and f/u with dr. Marcano as outpatient. All labwork/radiology results discussed in detail with patient. Patient re-examined and re-evaluated. Patient feels much better at this time. ED evaluation, Diagnosis and management discussed with the patient in detail. Workup results discussed with ED attending, OK to dc home. Close vascular & PMD follow up encouraged, aftercare to assist with scheduling appointment ASAP. Strict ED return instructions discussed in detail and patient given the opportunity to ask any questions about their discharge diagnosis and instructions. Patient verbalized understanding. ~ Antonino Gipson PA-C

## 2025-02-19 NOTE — ED PROVIDER NOTE - CARE PLAN
Principal Discharge DX:	Hypotension  Secondary Diagnosis:	Pseudoaneurysm  Secondary Diagnosis:	Hypokalemia  Secondary Diagnosis:	Hypomagnesemia   1

## 2025-02-19 NOTE — ED PROVIDER NOTE - ATTENDING APP SHARED VISIT CONTRIBUTION OF CARE
I, Neftali Haynes, DO personally saw the patient with ROSARIO.  I have personally performed a face to face diagnostic evaluation on this patient.  I have reviewed the ROSARIO note and agree with the history, exam, and plan of care, except as noted.  I personally saw the patient and performed a substantive portion of the visit including all aspects of the medical decision making.

## 2025-02-19 NOTE — ED PROVIDER NOTE - OBJECTIVE STATEMENT
PA: Patient is a 69 year old female with PMHx of anemia, anxiety, depression, bladder CA, cardiac murmur, carotid stenosis, cataracts, crohn's disease currently under Remicade infusions, GERD, Hx of seizures, hypotension who presents to ED c/o low BP while getting her Remicade infusion at infusion center today. Patient also reports mild lower abd pain but it maybe normal for her due to her hx of Crohn's disease. DENIES n/v/d, dizziness, lightheadedness, fever, CP, SOB. ~Antonino Gipson PA-C

## 2025-02-19 NOTE — ED PROVIDER NOTE - SPECIALTY CARE
Pt lives with his daughter Ching (whom works full time) in a private home, 3 entry steps (+ rail), pt stays on 1st floor. Pt sleeps in a hospital bed & has a home health aide 10 hours/day, 7 days/week. Family provides assist when aide not present. Pt requires total assist for all bed mobility & OOB to wheelchair transfers (squat pivot). Pt is non-ambulatory x years. Pt dependent for ADL's & incontinent at baseline. Pt was not receiving any restorative PT/OT services. Daughter states pt was not able to maintain upright sitting at edge of bed or in wheelchair at baseline. Goal of therapy: return home. Family inquiring about dependent mechanical lift (ie: darrell) in order to decrease caregiver burden & improve safety during transfers. In addition, pt would benefit from off-loading cushion for wheelchair.
Vascular Surgery

## 2025-02-19 NOTE — CONSULT NOTE ADULT - SUBJECTIVE AND OBJECTIVE BOX
HPI:     68 yo Fwith PMHx of anemia, anxiety, depression, bladder CA, cardiac murmur, carotid stenosis, cataracts, crohn's disease currently under Remicade infusions, GERD, Hx of seizures, hypotension who presents to ED c/o low BP while getting her Remicade infusion at infusion center today. Patient also reports mild lower abd pain but it maybe normal for her due to her hx of Crohn's disease. DENIES n/v/d, dizziness, lightheadedness, fever, CP, SOB.   Vascular surgery consulted due to findings of R venous femoral PSA. She is s/p cardiac ablation for afib 2 weeks ago at [facility]. Since then she has had mild pain in the right and left groin that has persisted postop. She denies any radiation of the pain down her legs, any sensory or motor deficitis.     PAST MEDICAL & SURGICAL HISTORY:  Hypothyroid      Headache      Asthma      Reflux      PUD (peptic ulcer disease)      Muscle spasm of both lower legs      Colitis      Crohns disease      Panic attacks      Exostosis  right middle finger      Sleep apnea      Hypotension      Anemia      PUD (peptic ulcer disease)      GERD (gastroesophageal reflux disease)      Rheumatoid arthritis      Degenerative disc disease, lumbar      DJD (degenerative joint disease)  B/L Knee Replacement      Morbid obesity  S/P Gastric By-Pass surgery      COVID-19  2/2021      Renal cyst      Seasonal allergic rhinitis      Cardiac murmur      Carotid stenosis      Carpal tunnel syndrome      Cellulitis      Diabetes mellitus      Anxiety and depression      Renal calculi      Plantar fasciitis      Obese      Seizure      Lumbar radiculopathy      H/O herpes zoster      Hemorrhoid      H/O cervical fracture      H/O fracture of humerus      Bladder tumor      Dry eyes      Cataract      H/O diabetes mellitus      History of seizure      PAF (paroxysmal atrial fibrillation)      Pulmonary embolism      Major depression      Hyperlipidemia      Abdominal hernia  5 times      History of tonsillectomy      History of carpal tunnel release      Complete rotator cuff tear  right x1 left x2      H/O vein stripping      Gastric bypass status for obesity  15 years ago and revision 1 year later      History of nasal surgery  fractured 2 times repaired      H/O rhinoplasty  1975      Boils  left arm, sweat gland removed      History of back surgery  placement 2 rods and 6 screws 2013 2020      S/P total knee arthroplasty, bilateral  left x1 right x2      History of cholecystectomy          MEDICATIONS  (STANDING):    MEDICATIONS  (PRN):      Allergies    codeine (Hives)  Prozac (Other)  morphine (Hives)    Intolerances        SOCIAL HISTORY:    FAMILY HISTORY:  Family history of arthritis (Mother)    Family history of cerebrovascular accident (CVA) (Mother, Father)    Family history of Alzheimer's disease (Father)            Physical Exam:  General: NAD, resting comfortably  HEENT: NC/AT, EOMI, normal hearing, no oral lesions, no LAD, neck supple  Pulmonary: normal resp effort, CTA-B  Cardiovascular: NSR, no murmurs  Abdominal: soft, ND, tender in suprapubic, LLQ, and RLQ  R groin with mass felt, tender to touch with no pulse or thrill palpable   Pulses: Bilateral femoral pulses palpable  Neuro: A/O x 3, CNs II-XII grossly intact, normal sensation, no focal deficits    Vital Signs Last 24 Hrs  T(C): 36.3 (19 Feb 2025 15:29), Max: 36.3 (19 Feb 2025 15:29)  T(F): 97.4 (19 Feb 2025 15:29), Max: 97.4 (19 Feb 2025 15:29)  HR: 71 (19 Feb 2025 20:15) (71 - 77)  BP: 104/70 (19 Feb 2025 20:15) (86/70 - 104/70)  BP(mean): 81 (19 Feb 2025 20:15) (76 - 81)  RR: 17 (19 Feb 2025 20:15) (17 - 18)  SpO2: 95% (19 Feb 2025 20:15) (95% - 100%)    Parameters below as of 19 Feb 2025 20:15  Patient On (Oxygen Delivery Method): room air            LABS:                        9.1    4.51  )-----------( 117      ( 19 Feb 2025 16:08 )             29.6     02-19    143  |  112[H]  |  17  ----------------------------<  101[H]  3.2[L]   |  26  |  0.52    Ca    8.4[L]      19 Feb 2025 16:08  Mg     1.3     02-19    TPro  6.0  /  Alb  3.2[L]  /  TBili  0.3  /  DBili  x   /  AST  11[L]  /  ALT  35  /  AlkPhos  87  02-19      Urinalysis Basic - ( 19 Feb 2025 16:08 )    Color: x / Appearance: x / SG: x / pH: x  Gluc: 101 mg/dL / Ketone: x  / Bili: x / Urobili: x   Blood: x / Protein: x / Nitrite: x   Leuk Esterase: x / RBC: x / WBC x   Sq Epi: x / Non Sq Epi: x / Bacteria: x      CAPILLARY BLOOD GLUCOSE        LIVER FUNCTIONS - ( 19 Feb 2025 16:08 )  Alb: 3.2 g/dL / Pro: 6.0 gm/dL / ALK PHOS: 87 U/L / ALT: 35 U/L / AST: 11 U/L / GGT: x             Cultures:      RADIOLOGY & ADDITIONAL STUDIES:    US Duplex Arterial Lower Ext Ltd, Right (02.19.25 @ 19:30)     Venous pseudoaneurysm arises from the right saphenofemoral junction,   measuring 1.3 x 0.6 x 1.1 cm, and is associated with right great   saphenous vein partial thrombosis. The neck is approximately 0.1 cm in   diameter and 0.4 cm in length. Normal venous flow identified of the right   common femoral vein. Correlate with clinical history. Follow to   resolution.    No right groin arterial pseudoaneurysm is identified. Antegrade   multiphasic flow noted of right common femoral artery, proximal right   deep femoral artery, right superficial femoral artery, right popliteal   artery, and segmentally visualized right trifurcation arteries.      CT Abdomen and Pelvis w/ IV Cont (02.19.25 @ 16:58)     IMPRESSION:    No hydronephrosis or obstructing ureteral calculus. Right renal pelvis is   associated with 4 mm nonobstructing calculus. Minimal stranding at the   right renal pelvis. Correlate for superimposed infection.    No bowel obstruction. Colon is overall nondistended, partially   fluid-filled, with minimal stool burden and relatively ahaustral   appearance of the distal colon. Minimal perirectal stranding is   nonspecific. Correlate for any GI symptoms given the history of Crohn's   disease and abdominal pain.    Right groin new 1.3 cm hypervascular focus adjacent to the right femoral   vessels, with possible associated small hematoma. Correlate for recent   vascular access. Recommend right groin arterial and venous duplex   ultrasound to exclude arterial pseudoaneurysm.

## 2025-02-19 NOTE — ED ADULT TRIAGE NOTE - CHIEF COMPLAINT QUOTE
pt presents to Ed with complaints of hypotension at infusion center. pt went for Remicade infusion. found to have BP 80s/50s. sent to room for vitals and EKG.

## 2025-02-19 NOTE — ED PROVIDER NOTE - PHYSICAL EXAMINATION
PA NOTE: GEN: AOX3, NAD. HEENT: Throat clear. Airway is patent. EYES: PERRLA. EOMI. Head: NC/AT. NECK: Supple, No JVD. FROM. C-spine non-tender. CV:S1S2, RRR, LUNGS: Non-labored breathing, no tachypnea. O2sat 100% RA. CTA b/l. No w/r/r. CHEST: Equal chest expansion and rise. No deformity. ABD: Soft, NT/ND, no rebound, no guarding. No CVAT. EXT: No e/c/c. 2+ distal pulses. SKIN: No rashes. NEURO: No focal deficits. CN II-XII intact. FROM. 5/5 motor and sensory. ~Antonino Gipson PA-C PA NOTE: GEN: AOX3, NAD. HEENT: Throat clear. Airway is patent. EYES: PERRLA. EOMI. Head: NC/AT. NECK: Supple, No JVD. FROM. C-spine non-tender. CV:S1S2, RRR, LUNGS: Non-labored breathing, no tachypnea. O2sat 100% RA. CTA b/l. No w/r/r. CHEST: Equal chest expansion and rise. No deformity. ABD: Soft, +Mild tenderness lower abd diffusely. No rebound, no guarding. No CVAT. EXT: No e/c/c. 2+ distal pulses. SKIN: No rashes. NEURO: No focal deficits. CN II-XII intact. FROM. 5/5 motor and sensory. ~Antonion Gipson PA-C

## 2025-02-19 NOTE — ED PROVIDER NOTE - CARE PROVIDER_API CALL
Gael Marcano  Vascular Surgery  [address], [address]  [place] [address]  Phone: [phone]  Fax: [phone]  Follow Up Time: Urgent

## 2025-02-19 NOTE — ED PROVIDER NOTE - CLINICAL SUMMARY MEDICAL DECISION MAKING FREE TEXT BOX
Patient comes in with low blood pressure.  It was noted during Remicade infusion.  She finished her second of the Remicade infusion today.  Patient says her blood pressure fluctuates so her outpatient doctor has her on midodrine 2.5 mg to take as needed at home.  Patient says she feels well baseline.  No complaints from her.  She says her abdominal pain is chronic due to Crohn's disease and ulcerative colitis.  She said she has been having chronic diarrhea several episodes per day watery brown nonbloody.  She comes in with a soft blood pressure but normal MAP.  Afebrile well-appearing abdomen diffusely tender mild.  Nondistended nonrigid.  Nonfocal neuroexam.  Warm well-perfused extremities.  No respiratory distress lungs CTA positive S1 positive S2.  No distress.  Labs unremarkable for acute finding.  CT abdomen pelvis negative for acute finding pending duplex rule out pseudoaneurysm.  Dispo as per ultrasound results.

## 2025-02-19 NOTE — ED PROVIDER NOTE - PATIENT PORTAL LINK FT
You can access the FollowMyHealth Patient Portal offered by [facility] by registering at the following website: [email or web address]. By joining [employer]’s FollowMyHealth portal, you will also be able to view your health information using other applications (apps) compatible with our system.

## 2025-02-19 NOTE — ED ADULT NURSE NOTE - OBJECTIVE STATEMENT
pt ambulatory to the ER for low blood pressure. pt reports being at her infusion, getting a chemo treatment, when he blood pressure went as low as 70s/40s. pt states "I feel a little lightheaded, but this happens to me all the time. I have medication that I take that helps increase my BP". endorsing abdominal pain, states "I think im just having a flare up". hx bladder cancer, colitis crohns. pt denies any other medical complaints.

## 2025-02-19 NOTE — ED ADULT TRIAGE NOTE - PRO INTERPRETER NEED 2
English Creatinine elevated to 2.6, up from baseline of 1.29 on admission to OSH  - Creatinine appears to have acutely worsened 4 days after receiving IV contrast for CT angio of the chest- may be DELFIN  - Pt also previously on Lasix for pulmonary edema  - Maintenance fluids with NS @ 75cc for 12 hours  - Monitor creatinine daily  - Avoid nephrotoxic agents  - Renally dose medications Creatinine elevated to 2.6, up from baseline of 1.29 on admission to OSH  - Creatinine appears to have acutely worsened 4 days after receiving IV contrast for CT angio of the chest- may be DELFIN, as well as continued Lasix use at OSH and dehydration due to being NPO   - Pt also previously on Lasix for pulmonary edema  - Maintenance fluids with NS @ 75cc for 12 hours  - Monitor creatinine daily  - Avoid nephrotoxic agents  - Renally dose medications Creatinine elevated to 2.6, up from baseline of 1.29 on admission to OSH  - Renal consult in AM  - Creatinine appears to have acutely worsened 4 days after receiving IV contrast for CT angio of the chest- may be DELFIN, as well as continued Lasix use at OSH and dehydration due to being NPO   - Renal US ordered  - Pt also previously on Lasix for pulmonary edema  - Maintenance fluids with NS @ 75cc for 12 hours  - Monitor creatinine daily  - Avoid nephrotoxic agents  - Renally dose medications Creatinine elevated to 2.6, up from baseline of 1.29 on admission to [facility] contrast induced nephropathy;  - Creatinine appears to have acutely worsened 4 days after receiving IV contrast for CT angio of the chest (1/4/18), also Lasix regimen;  - Renal US ordered  - Renal consult in AM  - Pt also previously on Lasix for pulmonary edema  - Maintenance fluids with NS @ 75cc for 12 hours  - Monitor creatinine daily  - Avoid nephrotoxic agents  - Renally dose medications

## 2025-02-19 NOTE — CONSULT NOTE ADULT - ASSESSMENT
68 yo F with R femoral venous PSA s/p cardiac ablation  Likely iatrogenic in nature    Given the size of the PSA and the venous origin, there is no surgical intervention indicated. The PSA is expected to resolve on its own.   Patient should followup outpatient with Dr Marcano in 1-2 weeks       Plan discussed with Dr. Marcano

## 2025-03-19 ENCOUNTER — APPOINTMENT (OUTPATIENT)
Dept: RHEUMATOLOGY | Facility: CLINIC | Age: 70
End: 2025-03-19

## 2025-03-20 ENCOUNTER — APPOINTMENT (OUTPATIENT)
Dept: VASCULAR SURGERY | Facility: CLINIC | Age: 70
End: 2025-03-20

## 2025-03-27 ENCOUNTER — APPOINTMENT (OUTPATIENT)
Dept: RHEUMATOLOGY | Facility: CLINIC | Age: 70
End: 2025-03-27

## 2025-03-27 VITALS
SYSTOLIC BLOOD PRESSURE: 94 MMHG | RESPIRATION RATE: 17 BRPM | HEART RATE: 140 BPM | TEMPERATURE: 98 F | DIASTOLIC BLOOD PRESSURE: 57 MMHG | OXYGEN SATURATION: 98 %

## 2025-03-27 VITALS — HEART RATE: 70 BPM

## 2025-04-07 ENCOUNTER — RX RENEWAL (OUTPATIENT)
Age: 70
End: 2025-04-07

## 2025-04-09 ENCOUNTER — APPOINTMENT (OUTPATIENT)
Dept: GASTROENTEROLOGY | Facility: CLINIC | Age: 70
End: 2025-04-09
Payer: MEDICARE

## 2025-04-09 VITALS
DIASTOLIC BLOOD PRESSURE: 66 MMHG | BODY MASS INDEX: 31.41 KG/M2 | SYSTOLIC BLOOD PRESSURE: 100 MMHG | WEIGHT: 184 LBS | HEIGHT: 64 IN

## 2025-04-09 DIAGNOSIS — K52.9 NONINFECTIVE GASTROENTERITIS AND COLITIS, UNSPECIFIED: ICD-10-CM

## 2025-04-09 DIAGNOSIS — R10.9 UNSPECIFIED ABDOMINAL PAIN: ICD-10-CM

## 2025-04-09 DIAGNOSIS — K50.90 CROHN'S DISEASE, UNSPECIFIED, W/OUT COMPLICATIONS: ICD-10-CM

## 2025-04-09 DIAGNOSIS — Z09 ENCOUNTER FOR FOLLOW-UP EXAMINATION AFTER COMPLETED TREATMENT FOR CONDITIONS OTHER THAN MALIGNANT NEOPLASM: ICD-10-CM

## 2025-04-09 PROCEDURE — 99213 OFFICE O/P EST LOW 20 MIN: CPT

## 2025-04-09 PROCEDURE — G2211 COMPLEX E/M VISIT ADD ON: CPT

## 2025-04-09 RX ORDER — AMIODARONE HYDROCHLORIDE 400 MG/1
TABLET ORAL
Refills: 0 | Status: ACTIVE | COMMUNITY

## 2025-04-10 ENCOUNTER — APPOINTMENT (OUTPATIENT)
Dept: NEUROLOGY | Facility: CLINIC | Age: 70
End: 2025-04-10
Payer: MEDICARE

## 2025-04-10 VITALS
BODY MASS INDEX: 31.37 KG/M2 | WEIGHT: 186 LBS | HEIGHT: 64.5 IN | SYSTOLIC BLOOD PRESSURE: 142 MMHG | HEART RATE: 71 BPM | DIASTOLIC BLOOD PRESSURE: 64 MMHG

## 2025-04-10 DIAGNOSIS — R26.9 UNSPECIFIED ABNORMALITIES OF GAIT AND MOBILITY: ICD-10-CM

## 2025-04-10 DIAGNOSIS — R41.3 OTHER AMNESIA: ICD-10-CM

## 2025-04-10 DIAGNOSIS — R56.9 UNSPECIFIED CONVULSIONS: ICD-10-CM

## 2025-04-10 PROCEDURE — G2211 COMPLEX E/M VISIT ADD ON: CPT

## 2025-04-10 PROCEDURE — 99213 OFFICE O/P EST LOW 20 MIN: CPT

## 2025-04-15 ENCOUNTER — OUTPATIENT (OUTPATIENT)
Dept: OUTPATIENT SERVICES | Facility: HOSPITAL | Age: 70
LOS: 1 days | End: 2025-04-15
Payer: MEDICARE

## 2025-04-15 ENCOUNTER — APPOINTMENT (OUTPATIENT)
Dept: CT IMAGING | Facility: CLINIC | Age: 70
End: 2025-04-15

## 2025-04-15 DIAGNOSIS — Z98.89 OTHER SPECIFIED POSTPROCEDURAL STATES: Chronic | ICD-10-CM

## 2025-04-15 DIAGNOSIS — K46.9 UNSPECIFIED ABDOMINAL HERNIA WITHOUT OBSTRUCTION OR GANGRENE: Chronic | ICD-10-CM

## 2025-04-15 DIAGNOSIS — Z41.1 ENCOUNTER FOR COSMETIC SURGERY: Chronic | ICD-10-CM

## 2025-04-15 DIAGNOSIS — L02.92 FURUNCLE, UNSPECIFIED: Chronic | ICD-10-CM

## 2025-04-15 DIAGNOSIS — Z90.49 ACQUIRED ABSENCE OF OTHER SPECIFIED PARTS OF DIGESTIVE TRACT: Chronic | ICD-10-CM

## 2025-04-15 DIAGNOSIS — Z98.84 BARIATRIC SURGERY STATUS: Chronic | ICD-10-CM

## 2025-04-15 DIAGNOSIS — Z96.653 PRESENCE OF ARTIFICIAL KNEE JOINT, BILATERAL: Chronic | ICD-10-CM

## 2025-04-15 DIAGNOSIS — M75.120 COMPLETE ROTATOR CUFF TEAR OR RUPTURE OF UNSPECIFIED SHOULDER, NOT SPECIFIED AS TRAUMATIC: Chronic | ICD-10-CM

## 2025-04-15 PROCEDURE — 74177 CT ABD & PELVIS W/CONTRAST: CPT

## 2025-04-15 PROCEDURE — 74177 CT ABD & PELVIS W/CONTRAST: CPT | Mod: 26

## 2025-05-01 ENCOUNTER — APPOINTMENT (OUTPATIENT)
Dept: GASTROENTEROLOGY | Facility: CLINIC | Age: 70
End: 2025-05-01
Payer: MEDICARE

## 2025-05-01 VITALS
HEIGHT: 64.5 IN | SYSTOLIC BLOOD PRESSURE: 122 MMHG | DIASTOLIC BLOOD PRESSURE: 80 MMHG | WEIGHT: 190 LBS | BODY MASS INDEX: 32.04 KG/M2

## 2025-05-01 DIAGNOSIS — Z09 ENCOUNTER FOR FOLLOW-UP EXAMINATION AFTER COMPLETED TREATMENT FOR CONDITIONS OTHER THAN MALIGNANT NEOPLASM: ICD-10-CM

## 2025-05-01 DIAGNOSIS — K50.90 CROHN'S DISEASE, UNSPECIFIED, W/OUT COMPLICATIONS: ICD-10-CM

## 2025-05-01 PROCEDURE — 99213 OFFICE O/P EST LOW 20 MIN: CPT

## 2025-05-08 ENCOUNTER — RX RENEWAL (OUTPATIENT)
Age: 70
End: 2025-05-08

## 2025-05-29 ENCOUNTER — OUTPATIENT (OUTPATIENT)
Dept: OUTPATIENT SERVICES | Facility: HOSPITAL | Age: 70
LOS: 1 days | Discharge: HOME | End: 2025-05-29
Payer: MEDICARE

## 2025-05-29 DIAGNOSIS — Z90.49 ACQUIRED ABSENCE OF OTHER SPECIFIED PARTS OF DIGESTIVE TRACT: Chronic | ICD-10-CM

## 2025-05-29 DIAGNOSIS — Z98.89 OTHER SPECIFIED POSTPROCEDURAL STATES: Chronic | ICD-10-CM

## 2025-05-29 DIAGNOSIS — K50.90 CROHN'S DISEASE, UNSPECIFIED, WITHOUT COMPLICATIONS: ICD-10-CM

## 2025-05-29 DIAGNOSIS — L02.92 FURUNCLE, UNSPECIFIED: Chronic | ICD-10-CM

## 2025-05-29 DIAGNOSIS — M75.120 COMPLETE ROTATOR CUFF TEAR OR RUPTURE OF UNSPECIFIED SHOULDER, NOT SPECIFIED AS TRAUMATIC: Chronic | ICD-10-CM

## 2025-05-29 DIAGNOSIS — Z98.84 BARIATRIC SURGERY STATUS: Chronic | ICD-10-CM

## 2025-05-29 DIAGNOSIS — Z41.1 ENCOUNTER FOR COSMETIC SURGERY: Chronic | ICD-10-CM

## 2025-05-29 DIAGNOSIS — K46.9 UNSPECIFIED ABDOMINAL HERNIA WITHOUT OBSTRUCTION OR GANGRENE: Chronic | ICD-10-CM

## 2025-05-29 DIAGNOSIS — Z96.653 PRESENCE OF ARTIFICIAL KNEE JOINT, BILATERAL: Chronic | ICD-10-CM

## 2025-05-29 RX ORDER — INFLIXIMAB-DYYB 120 MG/ML
430 INJECTION SUBCUTANEOUS ONCE
Refills: 0 | Status: DISCONTINUED | OUTPATIENT
Start: 2025-05-29 | End: 2025-05-29

## 2025-05-29 RX ORDER — DIPHENHYDRAMINE HCL 12.5MG/5ML
25 ELIXIR ORAL ONCE
Refills: 0 | Status: DISCONTINUED | OUTPATIENT
Start: 2025-05-29 | End: 2025-05-29

## 2025-05-30 DIAGNOSIS — K50.90 CROHN'S DISEASE, UNSPECIFIED, WITHOUT COMPLICATIONS: ICD-10-CM

## 2025-06-19 ENCOUNTER — OFFICE (OUTPATIENT)
Dept: URBAN - METROPOLITAN AREA CLINIC 12 | Facility: CLINIC | Age: 70
Setting detail: OPHTHALMOLOGY
End: 2025-06-19
Payer: MEDICAID

## 2025-06-19 DIAGNOSIS — H43.393: ICD-10-CM

## 2025-06-19 DIAGNOSIS — E11.9: ICD-10-CM

## 2025-06-19 DIAGNOSIS — H04.123: ICD-10-CM

## 2025-06-19 PROBLEM — H26.493 POSTERIOR CAPSULAR OPACIFICATION; BOTH EYES: Status: ACTIVE | Noted: 2025-06-19

## 2025-06-19 PROCEDURE — 92004 COMPRE OPH EXAM NEW PT 1/>: CPT | Performed by: OPHTHALMOLOGY

## 2025-06-19 ASSESSMENT — REFRACTION_AUTOREFRACTION
OD_SPHERE: -0.25
OS_SPHERE: -0.50
OD_CYLINDER: -0.25
OS_AXIS: 119
OS_CYLINDER: -0.25
OD_AXIS: 113

## 2025-06-19 ASSESSMENT — VISUAL ACUITY
OD_BCVA: 20/25
OS_BCVA: 20/25+2

## 2025-06-19 ASSESSMENT — SUPERFICIAL PUNCTATE KERATITIS (SPK)
OS_SPK: T
OD_SPK: T

## 2025-06-19 ASSESSMENT — KERATOMETRY
OS_K1POWER_DIOPTERS: 43.75
OS_AXISANGLE_DEGREES: 066
OD_K2POWER_DIOPTERS: 44.50
OD_K1POWER_DIOPTERS: 44.00
OS_K2POWER_DIOPTERS: 44.25
OD_AXISANGLE_DEGREES: 031

## 2025-06-19 ASSESSMENT — REFRACTION_MANIFEST
OS_CYLINDER: -0.75
OS_AXIS: 118
OD_CYLINDER: -0.50
OD_ADD: +2.50
OD_CYLINDER: -0.25
OS_SPHERE: +1.25
OS_ADD: +2.50
OD_SPHERE: +2.75
OD_SPHERE: +1.00
OS_VA1: 20/20
OS_AXIS: 118
OD_AXIS: 115
OS_SPHERE: +2.50
OD_VA1: 20/30
OD_AXIS: 035
OS_CYLINDER: -0.25
OD_VA1: 20/20

## 2025-06-19 ASSESSMENT — TONOMETRY
OD_IOP_MMHG: 11
OS_IOP_MMHG: 11

## 2025-06-19 ASSESSMENT — CONFRONTATIONAL VISUAL FIELD TEST (CVF)
OD_FINDINGS: FULL
OS_FINDINGS: FULL

## 2025-06-23 ENCOUNTER — APPOINTMENT (OUTPATIENT)
Dept: GASTROENTEROLOGY | Facility: CLINIC | Age: 70
End: 2025-06-23
Payer: MEDICARE

## 2025-06-23 VITALS
HEIGHT: 64.5 IN | SYSTOLIC BLOOD PRESSURE: 108 MMHG | WEIGHT: 195 LBS | BODY MASS INDEX: 32.89 KG/M2 | DIASTOLIC BLOOD PRESSURE: 68 MMHG

## 2025-06-23 PROCEDURE — 99213 OFFICE O/P EST LOW 20 MIN: CPT

## 2025-07-07 ENCOUNTER — RX RENEWAL (OUTPATIENT)
Age: 70
End: 2025-07-07

## 2025-07-07 RX ORDER — LEVETIRACETAM 750 MG/1
750 TABLET, FILM COATED ORAL
Qty: 180 | Refills: 1 | Status: ACTIVE | COMMUNITY
Start: 2025-07-07 | End: 1900-01-01

## 2025-07-18 ENCOUNTER — APPOINTMENT (OUTPATIENT)
Dept: NEUROLOGY | Facility: CLINIC | Age: 70
End: 2025-07-18
Payer: MEDICARE

## 2025-07-18 VITALS
HEART RATE: 68 BPM | RESPIRATION RATE: 15 BRPM | WEIGHT: 204 LBS | SYSTOLIC BLOOD PRESSURE: 110 MMHG | DIASTOLIC BLOOD PRESSURE: 65 MMHG | BODY MASS INDEX: 34.83 KG/M2 | HEIGHT: 64 IN

## 2025-07-18 PROBLEM — G47.52 UNCONTROLLED REM SLEEP BEHAVIOR DISORDER: Status: ACTIVE | Noted: 2025-07-18

## 2025-07-18 PROCEDURE — 99215 OFFICE O/P EST HI 40 MIN: CPT

## 2025-07-18 PROCEDURE — G2212 PROLONG OUTPT/OFFICE VIS: CPT

## 2025-08-13 ENCOUNTER — APPOINTMENT (OUTPATIENT)
Dept: NEUROLOGY | Facility: CLINIC | Age: 70
End: 2025-08-13

## 2025-08-13 VITALS
WEIGHT: 207 LBS | HEIGHT: 64.5 IN | BODY MASS INDEX: 34.91 KG/M2 | DIASTOLIC BLOOD PRESSURE: 69 MMHG | TEMPERATURE: 98.2 F | HEART RATE: 70 BPM | SYSTOLIC BLOOD PRESSURE: 108 MMHG

## 2025-08-13 DIAGNOSIS — G20.C PARKINSONISM, UNSPECIFIED: ICD-10-CM

## 2025-08-13 PROCEDURE — 99214 OFFICE O/P EST MOD 30 MIN: CPT

## 2025-08-13 RX ORDER — CARBIDOPA AND LEVODOPA 25; 100 MG/1; MG/1
25-100 TABLET ORAL
Qty: 90 | Refills: 0 | Status: ACTIVE | COMMUNITY
Start: 2025-08-13 | End: 1900-01-01

## 2025-09-02 ENCOUNTER — OUTPATIENT (OUTPATIENT)
Dept: OUTPATIENT SERVICES | Facility: HOSPITAL | Age: 70
LOS: 1 days | End: 2025-09-02

## 2025-09-02 ENCOUNTER — APPOINTMENT (OUTPATIENT)
Dept: NUCLEAR MEDICINE | Facility: CLINIC | Age: 70
End: 2025-09-02
Payer: MEDICARE

## 2025-09-02 DIAGNOSIS — Z41.1 ENCOUNTER FOR COSMETIC SURGERY: Chronic | ICD-10-CM

## 2025-09-02 DIAGNOSIS — Z98.89 OTHER SPECIFIED POSTPROCEDURAL STATES: Chronic | ICD-10-CM

## 2025-09-02 DIAGNOSIS — Z96.653 PRESENCE OF ARTIFICIAL KNEE JOINT, BILATERAL: Chronic | ICD-10-CM

## 2025-09-02 DIAGNOSIS — Z90.49 ACQUIRED ABSENCE OF OTHER SPECIFIED PARTS OF DIGESTIVE TRACT: Chronic | ICD-10-CM

## 2025-09-02 DIAGNOSIS — L02.92 FURUNCLE, UNSPECIFIED: Chronic | ICD-10-CM

## 2025-09-02 DIAGNOSIS — G20.C PARKINSONISM, UNSPECIFIED: ICD-10-CM

## 2025-09-02 DIAGNOSIS — M75.120 COMPLETE ROTATOR CUFF TEAR OR RUPTURE OF UNSPECIFIED SHOULDER, NOT SPECIFIED AS TRAUMATIC: Chronic | ICD-10-CM

## 2025-09-02 DIAGNOSIS — Z98.84 BARIATRIC SURGERY STATUS: Chronic | ICD-10-CM

## 2025-09-02 DIAGNOSIS — K46.9 UNSPECIFIED ABDOMINAL HERNIA WITHOUT OBSTRUCTION OR GANGRENE: Chronic | ICD-10-CM

## 2025-09-02 PROCEDURE — 78803 RP LOCLZJ TUM SPECT 1 AREA: CPT | Mod: 26

## 2025-09-24 PROBLEM — Z09: Status: ACTIVE | Noted: 2025-09-24
